# Patient Record
Sex: FEMALE | Race: WHITE | Employment: OTHER
[De-identification: names, ages, dates, MRNs, and addresses within clinical notes are randomized per-mention and may not be internally consistent; named-entity substitution may affect disease eponyms.]

---

## 2024-03-29 ENCOUNTER — CLINICAL DOCUMENTATION (OUTPATIENT)
Dept: CASE MANAGEMENT | Age: 74
End: 2024-03-29

## 2024-03-29 ENCOUNTER — OFFICE VISIT (OUTPATIENT)
Dept: ONCOLOGY | Age: 74
End: 2024-03-29
Payer: MEDICARE

## 2024-03-29 ENCOUNTER — HOSPITAL ENCOUNTER (OUTPATIENT)
Dept: INFUSION THERAPY | Age: 74
Discharge: HOME | End: 2024-03-29
Payer: MEDICARE

## 2024-03-29 VITALS
TEMPERATURE: 98 F | SYSTOLIC BLOOD PRESSURE: 146 MMHG | DIASTOLIC BLOOD PRESSURE: 93 MMHG | OXYGEN SATURATION: 96 % | HEART RATE: 167 BPM | RESPIRATION RATE: 16 BRPM

## 2024-03-29 VITALS
HEIGHT: 60 IN | TEMPERATURE: 98 F | HEART RATE: 167 BPM | RESPIRATION RATE: 16 BRPM | OXYGEN SATURATION: 96 % | SYSTOLIC BLOOD PRESSURE: 146 MMHG | WEIGHT: 153.4 LBS | DIASTOLIC BLOOD PRESSURE: 93 MMHG | BODY MASS INDEX: 30.12 KG/M2

## 2024-03-29 DIAGNOSIS — K62.9 ANAL LESION: Primary | ICD-10-CM

## 2024-03-29 PROCEDURE — 99204 OFFICE O/P NEW MOD 45 MIN: CPT | Performed by: INTERNAL MEDICINE

## 2024-03-29 PROCEDURE — 99211 OFF/OP EST MAY X REQ PHY/QHP: CPT

## 2024-03-29 PROCEDURE — 1123F ACP DISCUSS/DSCN MKR DOCD: CPT | Performed by: INTERNAL MEDICINE

## 2024-03-29 RX ORDER — M-VIT,TX,IRON,MINS/CALC/FOLIC 27MG-0.4MG
1 TABLET ORAL DAILY
COMMUNITY

## 2024-03-29 RX ORDER — AMLODIPINE BESYLATE 5 MG/1
5 TABLET ORAL NIGHTLY
COMMUNITY
Start: 2024-02-14

## 2024-03-29 RX ORDER — ASPIRIN 81 MG/1
81 TABLET ORAL DAILY
COMMUNITY
End: 2024-03-29 | Stop reason: ALTCHOICE

## 2024-03-29 RX ORDER — VIT C/B6/B5/MAGNESIUM/HERB 173 50-5-6-5MG
500 CAPSULE ORAL DAILY
COMMUNITY
End: 2024-03-29

## 2024-03-29 ASSESSMENT — ENCOUNTER SYMPTOMS
ALLERGIC/IMMUNOLOGIC NEGATIVE: 1
EYES NEGATIVE: 1
RESPIRATORY NEGATIVE: 1
GASTROINTESTINAL NEGATIVE: 1

## 2024-03-29 NOTE — PATIENT INSTRUCTIONS
Follow up in 3 weeks. Referral to Dr. Sharma for rebiopsy of anal/rectal lesion and evaluation. Referral to rad onc for possible alka/rectal squamous cell cancer in situ ct scan chest abd pelvis for staging. Also request biopsy from [facility] for our pathologist to review

## 2024-03-29 NOTE — PROGRESS NOTES
Dear Pooja, [facility] [place] [facility]  [facility]  [address]  [address]  [place] [address]  Dept: [phone]  Loc: [phone]   Hematology/Oncology Consult (Clinic)        3/29/24     Tiffany Baldwin   1950     No ref. provider found   Pooja Herrera, APRN - CNP       Reason: Dear Pooja thank you for requesting a consult concerning patients anal/rectal lesion squamous instu histology for further evaluation and recommendations  .   Chief Complaint   Patient presents with    Follow-up     SQUAMOUS CELL CARCINOMA IN SITU ANAL         ASSESSMENT:     1. Anal lesion  -     Robbin Garcia MD, Radiation Oncology, [place]  -     CT CHEST W CONTRAST; Future  -     CT ABDOMEN PELVIS W IV CONTRAST Additional Contrast? Oral; Future  -     [facility] - Rene Zamudio DO, General Surgery, [place]       PLAN:   Tiffany had a colonoscopy where a 2 cm flat lesion was seen in the anal rectal region whose  biopsy came back as squamous cell in situ.  The question arises whether this is an anal versus rectal cancer.  Given the squamous histology would lean towards anal cancer but this was in situ.  We will request the pathology from [facility] to be read by our [facility] pathologist.  Will also get a CT scan of chest abdomen pelvis for staging purposes.  I will refer to radiation oncology for further evaluation  given this is an anal rectal lesion that may or may not need radiaiton.  I will also have general surgery see patient for possible rebiopsy of this lesion for more definitive diagnosis if possible.  She will return to see us after scans and these consults once we have more information I will make further recommendations at that time.  We did discuss that if this  was an anal cancer, that it was very early stage most likely and very treatable with combined chemotherapy and radiation.  However we need more information before we can make definitive

## 2024-03-29 NOTE — PROGRESS NOTES
Name: Tiffany Baldwin  : 1950  MRN: [id number]    Oncology Navigation- Initial Note:    Intake-  Contact Type: Medical Oncology    Diagnosis:  Anal-Squamous Insitu  Colonoscopy 24 by Dr. Hernandez; BX taken +cancer.  Pt reports NO signs or symptoms.  Previous screening colonoscopy was 12 yrs ago.    Colonoscopy was ordered by her PCP s/p Alecia hernandez.     ONC request for path slides to be obtained & sent to [facility]'s Path dept for review.-->MARILEE Lou updated & is working to obtain.    Home Disposition: Lives alone--16 yr old grandson stays with her intermittently.  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 [employer]    Patient needs and barriers to care: Coordination of Care, Knowledge deficit, and Symptom Management     Referral Source: Outpatient    Receptive to Advanced Care Planning/ Palliative Care:  deferred    Interventions-   General Interventions: [facility] program discussed; welcome folder reviewed     Education/Screenings:  yes -     ONC POC:  -Rad Onc consult  -Surg consult for further BX  -Obtain path slides for [facility] pathology to review-->[place] coordinating  -Tumor Board Presentation  -Reviewed tx recommendations with chemo/XRT:  5FU, mitomycin with XRT  -CT CAP  -Pt verbalized, \"never wanted to know if got cancer bc would opt for no tx bc she didn't want her quality of life impacted\"-->Onc discussion with pt re: dx being treatable, although tx has some side effects that will need to be managed vs the impact on her quality of life if opting to do nothing.  -Return 2 weeks to see ONC     Referrals: Supportive Therapies +     Continuum of Care: Diagnosis/Active Treatment    Notes: [facility] following to assist & support    Electronically signed by Jodie Benson RN on 3/29/2024 at 1:02 PM

## 2024-04-05 DIAGNOSIS — E78.2 MIXED HYPERLIPIDEMIA: ICD-10-CM

## 2024-04-05 DIAGNOSIS — I10 ESSENTIAL HYPERTENSION: Primary | ICD-10-CM

## 2024-04-06 ENCOUNTER — HOSPITAL ENCOUNTER (OUTPATIENT)
Age: 74
Discharge: HOME | End: 2024-04-06
Attending: INTERNAL MEDICINE
Payer: MEDICARE

## 2024-04-06 ENCOUNTER — HOSPITAL ENCOUNTER (OUTPATIENT)
Dept: CT IMAGING | Age: 74
Discharge: HOME | End: 2024-04-06
Attending: INTERNAL MEDICINE
Payer: MEDICARE

## 2024-04-06 DIAGNOSIS — K62.9 ANAL LESION: ICD-10-CM

## 2024-04-06 LAB
CREAT BLD-MCNC: 0.7 MG/DL (ref 0.5–1.2)
GFR SERPL CREATININE-BSD FRML MDRD: > 90 ML/MIN/1.73M2

## 2024-04-06 PROCEDURE — 71260 CT THORAX DX C+: CPT

## 2024-04-06 PROCEDURE — 82565 ASSAY OF CREATININE: CPT

## 2024-04-06 PROCEDURE — 6360000004 HC RX CONTRAST MEDICATION: Performed by: INTERNAL MEDICINE

## 2024-04-06 PROCEDURE — 74177 CT ABD & PELVIS W/CONTRAST: CPT

## 2024-04-06 RX ADMIN — IOPAMIDOL 100 ML: 755 INJECTION, SOLUTION INTRAVENOUS at 09:00

## 2024-04-09 ENCOUNTER — OFFICE VISIT (OUTPATIENT)
Dept: SURGERY | Age: 74
End: 2024-04-09
Payer: MEDICARE

## 2024-04-09 ENCOUNTER — HOSPITAL ENCOUNTER (OUTPATIENT)
Age: 74
Discharge: HOME | End: 2024-04-09
Payer: MEDICARE

## 2024-04-09 VITALS
TEMPERATURE: 97.6 F | HEART RATE: 90 BPM | WEIGHT: 153.2 LBS | DIASTOLIC BLOOD PRESSURE: 84 MMHG | HEIGHT: 60 IN | RESPIRATION RATE: 18 BRPM | OXYGEN SATURATION: 97 % | SYSTOLIC BLOOD PRESSURE: 132 MMHG | BODY MASS INDEX: 30.08 KG/M2

## 2024-04-09 DIAGNOSIS — Z01.818 PRE-OP TESTING: ICD-10-CM

## 2024-04-09 DIAGNOSIS — C20 PRIMARY SQUAMOUS CELL CARCINOMA OF RECTUM (HCC): Primary | ICD-10-CM

## 2024-04-09 PROBLEM — E78.2 MIXED HYPERLIPIDEMIA: Status: ACTIVE | Noted: 2024-04-09

## 2024-04-09 PROBLEM — I10 ESSENTIAL HYPERTENSION: Status: ACTIVE | Noted: 2024-04-09

## 2024-04-09 LAB
ANION GAP SERPL CALC-SCNC: 10 MEQ/L (ref 8–16)
BUN SERPL-MCNC: 16 MG/DL (ref 7–22)
CALCIUM SERPL-MCNC: 9.6 MG/DL (ref 8.5–10.5)
CHLORIDE SERPL-SCNC: 103 MEQ/L (ref 98–111)
CO2 SERPL-SCNC: 27 MEQ/L (ref 23–33)
CREAT SERPL-MCNC: 0.8 MG/DL (ref 0.4–1.2)
GFR SERPL CREATININE-BSD FRML MDRD: 78 ML/MIN/1.73M2
GLUCOSE SERPL-MCNC: 100 MG/DL (ref 70–108)
HCT VFR BLD AUTO: 45.6 % (ref 37–47)
HGB BLD-MCNC: 14.9 GM/DL (ref 12–16)
POTASSIUM SERPL-SCNC: 3.9 MEQ/L (ref 3.5–5.2)
SODIUM SERPL-SCNC: 140 MEQ/L (ref 135–145)

## 2024-04-09 PROCEDURE — G8427 DOCREV CUR MEDS BY ELIG CLIN: HCPCS | Performed by: SURGERY

## 2024-04-09 PROCEDURE — G8419 CALC BMI OUT NRM PARAM NOF/U: HCPCS | Performed by: SURGERY

## 2024-04-09 PROCEDURE — 1090F PRES/ABSN URINE INCON ASSESS: CPT | Performed by: SURGERY

## 2024-04-09 PROCEDURE — 85014 HEMATOCRIT: CPT

## 2024-04-09 PROCEDURE — 80048 BASIC METABOLIC PNL TOTAL CA: CPT

## 2024-04-09 PROCEDURE — 36415 COLL VENOUS BLD VENIPUNCTURE: CPT

## 2024-04-09 PROCEDURE — 85018 HEMOGLOBIN: CPT

## 2024-04-09 PROCEDURE — 3079F DIAST BP 80-89 MM HG: CPT | Performed by: SURGERY

## 2024-04-09 PROCEDURE — 3075F SYST BP GE 130 - 139MM HG: CPT | Performed by: SURGERY

## 2024-04-09 PROCEDURE — 99203 OFFICE O/P NEW LOW 30 MIN: CPT | Performed by: SURGERY

## 2024-04-09 RX ORDER — ASPIRIN 81 MG/1
81 TABLET, CHEWABLE ORAL DAILY
COMMUNITY
End: 2024-04-09

## 2024-04-09 RX ORDER — PHENOL 1.4 %
1 AEROSOL, SPRAY (ML) MUCOUS MEMBRANE 2 TIMES DAILY
COMMUNITY

## 2024-04-09 RX ORDER — DIPHENOXYLATE HYDROCHLORIDE AND ATROPINE SULFATE 2.5; .025 MG/1; MG/1
2 TABLET ORAL 4 TIMES DAILY PRN
COMMUNITY

## 2024-04-09 NOTE — PROGRESS NOTES
Out    Polio vaccine  Aged Out    Meningococcal (ACWY) vaccine  Aged Out     Review of Systems  Unless otherwise stated in HPI, ROS was unremarkable.       OBJECTIVE    VITALS:  height is 1.524 m (5') and weight is 69.5 kg (153 lb 3.2 oz). Her temporal temperature is 97.6 °F (36.4 °C). Her blood pressure is 132/84 and her pulse is 90. Her respiration is 18 and oxygen saturation is 97%.   CONSTITUTIONAL: Alert and oriented times 3, no acute distress and cooperative to examination with proper mood and affect.  SKIN: Skin color, texture, turgor normal. No rashes or lesions.  LYMPH: no cervical nodes, no inguinal nodes  HEENT: Head is normocephalic, atraumatic. EOMI, PERRLA.  NECK: Supple, symmetrical, trachea midline, no adenopathy, thyroid symmetric, not enlarged and no tenderness, skin normal.  CHEST/LUNGS: chest symmetric with normal A/P diameter, normal respiratory rate and rhythm, lungs clear to auscultation without wheezes, rales or rhonchi. No accessory muscle use. Scars None   CARDIOVASCULAR: Heart sounds are normal.  Regular rate and rhythm without murmur, gallop or rub. Normal S1 and S2. Carotid and femoral pulses 2+/4 and equal bilaterally.  ABDOMEN: Normal shape. No scar(s) present. Normal bowel sounds.  No bruits. soft, nontender, nondistended, no masses or organomegaly. no evidence of hernia. Percussion: Normal without hepatosplenomegally. Tenderness: absent.  RECTAL: 1.5 cm lesion palpable at the anal verge at the 3 O'clock position.   NEUROLOGIC: There are no focalizing motor or sensory deficits. CN II-XII are grossly intact..   EXTREMITIES: no cyanosis, no clubbing, and no edema.      Thank you for the interesting evaluation. Further recommendations as listed above.       Electronically signed by Rene Zamudio DO on 4/9/2024 at 2:46 PM

## 2024-04-10 ENCOUNTER — HOSPITAL ENCOUNTER (OUTPATIENT)
Dept: RADIATION ONCOLOGY | Age: 74
End: 2024-04-10
Payer: MEDICARE

## 2024-04-16 ENCOUNTER — PREP FOR PROCEDURE (OUTPATIENT)
Dept: SURGERY | Age: 74
End: 2024-04-16

## 2024-04-16 RX ORDER — SODIUM CHLORIDE 0.9 % (FLUSH) 0.9 %
5-40 SYRINGE (ML) INJECTION EVERY 12 HOURS SCHEDULED
Status: CANCELLED | OUTPATIENT
Start: 2024-04-16

## 2024-04-16 RX ORDER — SODIUM CHLORIDE 9 MG/ML
INJECTION, SOLUTION INTRAVENOUS PRN
Status: CANCELLED | OUTPATIENT
Start: 2024-04-16

## 2024-04-16 RX ORDER — SODIUM CHLORIDE 0.9 % (FLUSH) 0.9 %
5-40 SYRINGE (ML) INJECTION PRN
Status: CANCELLED | OUTPATIENT
Start: 2024-04-16

## 2024-04-17 NOTE — H&P
Rene Zamudio D.O. [facility] GENERAL SURGERY  [address]  [place] [address]  [phone]  New Patient Evaluation in Office    Pt Name: Tiffany Baldwin  Date of Birth 1950   Today's Date: 4/9/2024  Medical Record Number: [id number]  Referring Provider: Bassam Umana MD  Primary Care Provider: Pooja Herrera APRN - CNP  Chief Complaint   Patient presents with    Surgical Consult     NP refer Dr. Umana-Rebiopsy anal/rectal lesion     ASSESSMENT       Diagnosis Orders   1. Primary squamous cell carcinoma of rectum (HCC)  Basic Metabolic Panel    Hemoglobin and Hematocrit      2. Pre-op testing  Basic Metabolic Panel    Hemoglobin and Hematocrit           PLANS      Schedule Tiffany for excision of anal lesion  Potential diagnostic and therapeutic modalities were discussed with the patient including surgical and nonsurgical options. The risks, complications and benefits of the options were discussed. Complications including, but not limited to, bleeding, wound infection, recurrence, and sphincter injury were reviewed. The patient was given an opportunity to ask questions. Once answered, the patient is agreeable to proceed with surgery  Status: Outpatient  Planned anesthesia: general  She will undergo pre-operative clearance per anesthesia guidelines with risk factors listed under the past medical history diagnosis & problem list.     LESLIE Allen is a 73 y.o. female seen in the consultation for evaluation of an anal lesion. She underwent a screening colonoscopy performed by GI service on 3/15/24 showing a 2 cm squamous cell carcinoma, bx stating in situ. CT chest/abd/pelvis unable to visualize the lesion, no evidence of metastatic disease. She denies any rectal pain, bleeding, weight loss, previous injury to the rectum. Denies any STD'S.   Past Medical History  Past Medical History:   Diagnosis Date    Breast cancer (HCC)     Hyperlipidemia      evaluation: 4/18/2024    I have examined the patient and reviewed the H&P/Consult and there are no changes to the patient or plans.         Electronically signed by Rene Zamudio DO on 4/18/2024 at 1:48 PM

## 2024-04-17 NOTE — DISCHARGE INSTRUCTIONS
DR. ESPINOZA'S DISCHARGE INSTRUCTIONS    Pt Name: Tiffany Baldwin  Medical Record Number: [id number]  Today's Date: 4/18/2024  GENERAL ANESTHESIA OR SEDATION   1. Do not drive or operate hazardous machinery for 24 hours.  2. Do not make important business or personal decisions for 24 hours.  3. Do not drink alcoholic beverages or use tobacco for 24 hours.  ACTIVITY INSTRUCTIONS   You may resume normal activity tomorrow. Do not engage in strenuous activity that may place stress on your incision.   DIET INSTRUCTIONS   Regular diet as tolerated.  MEDICATIONS   Prescription written for Norco. Take as directed.  Do not drink alcohol or drive while taking pain medications. You may experience dizziness or drowsiness with these medications. You may also experience constipation which can be relieved with stool softners or laxatives.  You may resume your daily prescription medication schedule unless otherwise specified.  Do not take 325mg Aspirin or other blood thinners such as Coumadin or Plavix for 5 days.  WOUND & DRESSING INSTRUCTIONS   Always ensure you and your care giver clean hands before and after caring for the wound.  Keep dressing clean and dry for 48 hours. Change when soiled or wet.      Take sitz bath for 20 minutes twice daily and after bowel movements.  FOLLOW UP CARE, SPECIFICALLY WATCH FOR:    Fever over 101 degrees by mouth   Increased redness, warmth, hardness at operative site.   Blood soaked dressing (small amounts of oozing may be normal.)   Increased or progressive drainage from the surgical area   Inability to urinate or blood in the urine   Pain not relieved by the medications ordered   Persistent nausea and/or vomiting, unable to retain fluids.    FOLLOW-UP APPOINTMENT in 2 weeks    Call my office if you have any problem that concerns you, [phone]. After hours, you can reach the answering service via the office phone number. IF YOU NEED IMMEDIATE ATTENTION, GO TO THE EMERGENCY ROOM  AND YOUR DOCTOR WILL BE CONTACTED.      [address] [place] [address] [id number]  [place] [address]      Electronically signed by Rene Zamudio DO on 4/18/2024 at 3:32 PM

## 2024-04-18 ENCOUNTER — ANESTHESIA (OUTPATIENT)
Dept: OPERATING ROOM | Age: 74
End: 2024-04-18
Payer: MEDICARE

## 2024-04-18 ENCOUNTER — ANESTHESIA EVENT (OUTPATIENT)
Dept: OPERATING ROOM | Age: 74
End: 2024-04-18
Payer: MEDICARE

## 2024-04-18 ENCOUNTER — HOSPITAL ENCOUNTER (OUTPATIENT)
Age: 74
Setting detail: OUTPATIENT SURGERY
Discharge: HOME | End: 2024-04-18
Attending: SURGERY | Admitting: SURGERY
Payer: MEDICARE

## 2024-04-18 VITALS
OXYGEN SATURATION: 99 % | RESPIRATION RATE: 16 BRPM | SYSTOLIC BLOOD PRESSURE: 148 MMHG | BODY MASS INDEX: 30.04 KG/M2 | HEART RATE: 71 BPM | WEIGHT: 153 LBS | HEIGHT: 60 IN | TEMPERATURE: 97.6 F | DIASTOLIC BLOOD PRESSURE: 68 MMHG

## 2024-04-18 DIAGNOSIS — G89.18 ACUTE POSTOPERATIVE PAIN: Primary | ICD-10-CM

## 2024-04-18 DIAGNOSIS — C20 SQUAMOUS CELL CARCINOMA OF RECTUM (HCC): ICD-10-CM

## 2024-04-18 PROCEDURE — 3600000002 HC SURGERY LEVEL 2 BASE: Performed by: SURGERY

## 2024-04-18 PROCEDURE — 3700000000 HC ANESTHESIA ATTENDED CARE: Performed by: SURGERY

## 2024-04-18 PROCEDURE — 6370000000 HC RX 637 (ALT 250 FOR IP): Performed by: SURGERY

## 2024-04-18 PROCEDURE — 7100000011 HC PHASE II RECOVERY - ADDTL 15 MIN: Performed by: SURGERY

## 2024-04-18 PROCEDURE — 6360000002 HC RX W HCPCS: Performed by: SURGERY

## 2024-04-18 PROCEDURE — 2580000003 HC RX 258: Performed by: SURGERY

## 2024-04-18 PROCEDURE — 3600000012 HC SURGERY LEVEL 2 ADDTL 15MIN: Performed by: SURGERY

## 2024-04-18 PROCEDURE — 88305 TISSUE EXAM BY PATHOLOGIST: CPT

## 2024-04-18 PROCEDURE — 7100000010 HC PHASE II RECOVERY - FIRST 15 MIN: Performed by: SURGERY

## 2024-04-18 PROCEDURE — 45171 EXC RECT TUM TRANSANAL PART: CPT | Performed by: SURGERY

## 2024-04-18 PROCEDURE — 7100000000 HC PACU RECOVERY - FIRST 15 MIN: Performed by: SURGERY

## 2024-04-18 PROCEDURE — 3700000001 HC ADD 15 MINUTES (ANESTHESIA): Performed by: SURGERY

## 2024-04-18 PROCEDURE — 7100000001 HC PACU RECOVERY - ADDTL 15 MIN: Performed by: SURGERY

## 2024-04-18 PROCEDURE — 6360000002 HC RX W HCPCS

## 2024-04-18 PROCEDURE — 2709999900 HC NON-CHARGEABLE SUPPLY: Performed by: SURGERY

## 2024-04-18 RX ORDER — MORPHINE SULFATE 2 MG/ML
1 INJECTION, SOLUTION INTRAMUSCULAR; INTRAVENOUS EVERY 5 MIN PRN
Status: DISCONTINUED | OUTPATIENT
Start: 2024-04-18 | End: 2024-04-18 | Stop reason: HOSPADM

## 2024-04-18 RX ORDER — SODIUM CHLORIDE 9 MG/ML
INJECTION, SOLUTION INTRAVENOUS PRN
Status: DISCONTINUED | OUTPATIENT
Start: 2024-04-18 | End: 2024-04-18 | Stop reason: HOSPADM

## 2024-04-18 RX ORDER — SODIUM CHLORIDE 0.9 % (FLUSH) 0.9 %
5-40 SYRINGE (ML) INJECTION EVERY 12 HOURS SCHEDULED
Status: DISCONTINUED | OUTPATIENT
Start: 2024-04-18 | End: 2024-04-18 | Stop reason: HOSPADM

## 2024-04-18 RX ORDER — FENTANYL CITRATE 50 UG/ML
INJECTION, SOLUTION INTRAMUSCULAR; INTRAVENOUS PRN
Status: DISCONTINUED | OUTPATIENT
Start: 2024-04-18 | End: 2024-04-18 | Stop reason: SDUPTHER

## 2024-04-18 RX ORDER — KETOROLAC TROMETHAMINE 30 MG/ML
INJECTION, SOLUTION INTRAMUSCULAR; INTRAVENOUS PRN
Status: DISCONTINUED | OUTPATIENT
Start: 2024-04-18 | End: 2024-04-18 | Stop reason: SDUPTHER

## 2024-04-18 RX ORDER — NALOXONE HYDROCHLORIDE 0.4 MG/ML
INJECTION, SOLUTION INTRAMUSCULAR; INTRAVENOUS; SUBCUTANEOUS PRN
Status: DISCONTINUED | OUTPATIENT
Start: 2024-04-18 | End: 2024-04-18 | Stop reason: HOSPADM

## 2024-04-18 RX ORDER — HYDROCODONE BITARTRATE AND ACETAMINOPHEN 5; 325 MG/1; MG/1
1 TABLET ORAL EVERY 4 HOURS PRN
Status: DISCONTINUED | OUTPATIENT
Start: 2024-04-18 | End: 2024-04-18 | Stop reason: HOSPADM

## 2024-04-18 RX ORDER — DEXAMETHASONE SODIUM PHOSPHATE 10 MG/ML
INJECTION, EMULSION INTRAMUSCULAR; INTRAVENOUS PRN
Status: DISCONTINUED | OUTPATIENT
Start: 2024-04-18 | End: 2024-04-18 | Stop reason: SDUPTHER

## 2024-04-18 RX ORDER — PROPOFOL 10 MG/ML
INJECTION, EMULSION INTRAVENOUS PRN
Status: DISCONTINUED | OUTPATIENT
Start: 2024-04-18 | End: 2024-04-18 | Stop reason: SDUPTHER

## 2024-04-18 RX ORDER — HYDROCODONE BITARTRATE AND ACETAMINOPHEN 5; 325 MG/1; MG/1
1 TABLET ORAL EVERY 4 HOURS PRN
Qty: 30 TABLET | Refills: 0 | Status: SHIPPED | OUTPATIENT
Start: 2024-04-18 | End: 2024-04-23

## 2024-04-18 RX ORDER — ONDANSETRON 2 MG/ML
INJECTION INTRAMUSCULAR; INTRAVENOUS PRN
Status: DISCONTINUED | OUTPATIENT
Start: 2024-04-18 | End: 2024-04-18 | Stop reason: SDUPTHER

## 2024-04-18 RX ORDER — ONDANSETRON 2 MG/ML
4 INJECTION INTRAMUSCULAR; INTRAVENOUS EVERY 6 HOURS PRN
Status: DISCONTINUED | OUTPATIENT
Start: 2024-04-18 | End: 2024-04-18 | Stop reason: HOSPADM

## 2024-04-18 RX ORDER — FENTANYL CITRATE 50 UG/ML
50 INJECTION, SOLUTION INTRAMUSCULAR; INTRAVENOUS EVERY 5 MIN PRN
Status: DISCONTINUED | OUTPATIENT
Start: 2024-04-18 | End: 2024-04-18 | Stop reason: HOSPADM

## 2024-04-18 RX ORDER — LABETALOL HYDROCHLORIDE 5 MG/ML
10 INJECTION, SOLUTION INTRAVENOUS
Status: DISCONTINUED | OUTPATIENT
Start: 2024-04-18 | End: 2024-04-18 | Stop reason: HOSPADM

## 2024-04-18 RX ORDER — SODIUM CHLORIDE 0.9 % (FLUSH) 0.9 %
5-40 SYRINGE (ML) INJECTION PRN
Status: DISCONTINUED | OUTPATIENT
Start: 2024-04-18 | End: 2024-04-18 | Stop reason: HOSPADM

## 2024-04-18 RX ORDER — LIDOCAINE HCL/PF 100 MG/5ML
SYRINGE (ML) INJECTION PRN
Status: DISCONTINUED | OUTPATIENT
Start: 2024-04-18 | End: 2024-04-18 | Stop reason: SDUPTHER

## 2024-04-18 RX ORDER — BUPIVACAINE HYDROCHLORIDE 5 MG/ML
INJECTION, SOLUTION PERINEURAL PRN
Status: DISCONTINUED | OUTPATIENT
Start: 2024-04-18 | End: 2024-04-18 | Stop reason: ALTCHOICE

## 2024-04-18 RX ORDER — DIBUCAINE 1 G/100G
OINTMENT TOPICAL PRN
Status: DISCONTINUED | OUTPATIENT
Start: 2024-04-18 | End: 2024-04-18 | Stop reason: ALTCHOICE

## 2024-04-18 RX ADMIN — PROPOFOL 150 MG: 10 INJECTION, EMULSION INTRAVENOUS at 14:26

## 2024-04-18 RX ADMIN — ONDANSETRON 4 MG: 2 INJECTION INTRAMUSCULAR; INTRAVENOUS at 14:33

## 2024-04-18 RX ADMIN — FENTANYL CITRATE 100 MCG: 50 INJECTION, SOLUTION INTRAMUSCULAR; INTRAVENOUS at 14:26

## 2024-04-18 RX ADMIN — Medication 60 MG: at 14:26

## 2024-04-18 RX ADMIN — SODIUM CHLORIDE: 9 INJECTION, SOLUTION INTRAVENOUS at 12:04

## 2024-04-18 RX ADMIN — CEFOXITIN 2000 MG: 2 INJECTION, POWDER, FOR SOLUTION INTRAVENOUS at 14:32

## 2024-04-18 RX ADMIN — DEXAMETHASONE SODIUM PHOSPHATE 10 MG: 10 INJECTION, EMULSION INTRAMUSCULAR; INTRAVENOUS at 14:26

## 2024-04-18 RX ADMIN — KETOROLAC TROMETHAMINE 15 MG: 30 INJECTION, SOLUTION INTRAMUSCULAR at 15:00

## 2024-04-18 ASSESSMENT — PAIN - FUNCTIONAL ASSESSMENT: PAIN_FUNCTIONAL_ASSESSMENT: NONE - DENIES PAIN

## 2024-04-18 NOTE — PROGRESS NOTES
1503 pt arrived to PACU, awakens to voice and denies pain. VSS. Dressing CDI  1518 pt awake in bed, denies pain or nausea. VSS  1533 meets criteria for discharge from PACU, transported to [facility] in stable condition

## 2024-04-18 NOTE — ANESTHESIA POSTPROCEDURE EVALUATION
Department of Anesthesiology  Postprocedure Note    Patient: Tiffany Baldwin  MRN: [id number]  YOB: 1950  Date of evaluation: 4/18/2024    Procedure Summary       Date: 04/18/24 Room / Location: [facility] OR [address] [place] [address] OR    Anesthesia Start: 1422 Anesthesia Stop: 1506    Procedure: Transrectal Excision Squamous Cell Carcinoma Diagnosis:       Squamous cell carcinoma of rectum (HCC)      (Squamous cell carcinoma of rectum (HCC) [C20])    Surgeons: Rnee Zamudio DO Responsible Provider: Rustam Salazar MD    Anesthesia Type: General ASA Status: 3            Anesthesia Type: General    Luis Fernando Phase I: Luis Fernando Score: 10    Luis Fernando Phase II: Luis Fernando Score: 10    Anesthesia Post Evaluation    Patient location during evaluation: PACU  Patient participation: complete - patient participated  Level of consciousness: awake  Airway patency: patent  Nausea & Vomiting: no vomiting and no nausea  Cardiovascular status: hemodynamically stable  Respiratory status: acceptable and nasal cannula  Hydration status: stable  Pain management: adequate    No notable events documented.

## 2024-04-18 NOTE — PROGRESS NOTES

## 2024-04-18 NOTE — BRIEF OP NOTE
Brief Postoperative Note      Patient: Tiffany Baldwin  YOB: 1950  MRN: [id number]    Date of Procedure: 4/18/2024    Pre-Op Diagnosis Codes:     * Squamous cell carcinoma of rectum (HCC) [C20]    Post-Op Diagnosis: Same       Procedure(s):  Transrectal Excision Squamous Cell Carcinoma    Surgeon(s):  Rene Zamudio DO    Assistant:  Resident: Ricardo Esquivel DPM    Anesthesia: * No anesthesia type entered *    Estimated Blood Loss (mL): 20    Complications: None    Specimens:   ID Type Source Tests Collected by Time Destination   A : anal lesison Tissue Anus SURGICAL PATHOLOGY Rene Zamudio DO 4/18/2024 1438        Findings:  This procedure was not performed to treat primary cutaneous melanoma through wide local excision    Electronically signed by Rene Zamudio DO on 4/18/2024 at 3:29 PM

## 2024-04-18 NOTE — ANESTHESIA PRE PROCEDURE
Department of Anesthesiology  Preprocedure Note       Name:  Tiffany Baldwin   Age:  73 y.o.  :  1950                                          MRN:  [id number]         Date:  2024      Surgeon: Surgeon(s):  Rene Zamudio DO    Procedure: Procedure(s):  Transrectal Excision Squamous Cell Carcinoma    Medications prior to admission:   Prior to Admission medications    Medication Sig Start Date End Date Taking? Authorizing Provider   calcium carbonate 600 MG TABS tablet Take 1 tablet by mouth in the morning and at bedtime    Leela Wang MD   diphenoxylate-atropine (LOMOTIL) 2.5-0.025 MG per tablet Take 2 tablets by mouth 4 times daily as needed for Diarrhea.    Leela Wang MD   amLODIPine (NORVASC) 5 MG tablet Take 1 tablet by mouth at bedtime 24   Leela Wang MD   Multiple Vitamins-Minerals (THERAPEUTIC MULTIVITAMIN-MINERALS) tablet Take 1 tablet by mouth daily    Leela Wang MD   Omega-3 Fatty Acids (OMEGA 3 500 PO) Take 500 mg by mouth daily    Leela Wang MD       Current medications:    Current Facility-Administered Medications   Medication Dose Route Frequency Provider Last Rate Last Admin   • sodium chloride flush 0.9 % injection 5-40 mL  5-40 mL IntraVENous 2 times per day Rene Zamudio DO       • sodium chloride flush 0.9 % injection 5-40 mL  5-40 mL IntraVENous PRN Rene Zamudio DO       • 0.9 % sodium chloride infusion   IntraVENous PRN Rene Zamudio DO       • cefOXitin (MEFOXIN) 2,000 mg in sodium chloride 0.9 % 50 mL IVPB (mini-bag)  2,000 mg IntraVENous On Call to OR Rene Zamudio DO           Allergies:    Allergies   Allergen Reactions   • Ace Inhibitors Cough       Problem List:    Patient Active Problem List   Diagnosis Code   • Essential hypertension I10   • Mixed hyperlipidemia E78.2       Past Medical History:        Diagnosis Date   • Breast cancer (HCC)    • Hyperlipidemia    • Hypertension

## 2024-04-18 NOTE — PROGRESS NOTES
Pt returned to [facility] room 2. Vitals and assessment as charted. 0.9 infusing, to count from PACU. Pt has crackers and water. Family at the bedside. Pt and family verbalized understanding of discharge criteria and call light use. Call light in reach.

## 2024-04-18 NOTE — PROGRESS NOTES
[facility] with friend at bedside. Bed in low position side rails up call light in reach. Patient denies questions at this time.

## 2024-04-19 ENCOUNTER — TELEPHONE (OUTPATIENT)
Dept: SURGERY | Age: 74
End: 2024-04-19

## 2024-04-19 NOTE — TELEPHONE ENCOUNTER
Post procedural phone call placed to patient. Patient utilizing prescribed medication with adequate control of pain. Patient education against constipation, recommended increased fluid intake, progressive ambulation and stool softeners/stimulants as directed. Patient able to readback education. Patients follow up appointment verified and confirmed in chart. Time spent for patient questions. Patient to follow up with office as needed.

## 2024-04-20 NOTE — OP NOTE
[address] [address]                            OPERATIVE REPORT      PATIENT NAME: SEEMA VILLEGAS      : 1950  MED REC NO: [id number]                       ROOM: [facility]                                               (General) POOL                                               RM    ACCOUNT NO: [id number]                       ADMIT DATE: 2024  PROVIDER: Rene Zamudio DO      DATE OF PROCEDURE:  2024    SURGEON:  Rene Zamudio DO    PREOPERATIVE DIAGNOSES:  Squamous cell carcinoma in situ of the rectum.    POSTOPERATIVE DIAGNOSIS:  Squamous cell carcinoma in situ of the rectum.    PROCEDURE PERFORMED:  Transrectal excision of squamous cell carcinoma.    ANESTHESIA:  General with local.    ESTIMATED BLOOD LOSS:  20 mL.    SPECIMEN:  Portions of the squamous cell were sent to pathology for analysis.    COMPLICATION:  None appreciated.    DISCUSSION:  The patient is a 73-year-old female who underwent a colonoscopy finding a lesion present at the dentate line with biopsies concerning for squamous cell in situ.  After history and physical examination performed, potential diagnostic and therapeutic modalities discussed with the patient; operative and nonoperative management discussed; risks, complications, and benefits were reviewed.  She was given the opportunity to ask questions.  Once answered, informed consent was obtained.  The patient was brought to the operating room on 2024 for the procedure.    OPERATIVE FINDINGS:  At the time of the exploration, the patient had a squamous cell lesion present at the 5 o'clock position at the dentate line.  This had a relatively soft mucosal component.  There was a fair amount of dense tissue present through the sphincter level which suggested the possibility of an invasive carcinoma.  At risk of injuring the internal sphincter, the superficial portion of the  tumor was removed as described below.    PROCEDURE DESCRIPTION:  The patient was brought to the operating room and placed in supine position; placed on continuous cardiac telemetry, blood pressure, and pulse oximeter monitoring; placed under general anesthesia by the Anesthesia Department.  The patient was then repositioned in dorsal lithotomy, padded appropriately, and the posterior rectal area was prepped and draped in sterile fashion.  Digital rectal exam performed revealing a mass present at the 5 o'clock position.  A bivalve retractor was placed into the rectum.  Visual inspection was carried out.  Please see findings above.  The external portion of the lesion was grasped and elevated and incised using a #15 scalpel blade.  The superficial portion of this was somewhat fragmented and unfortunately it was taken off in pieces.  However, upon removing it off the sphincter, there was still nodularity present at the sphincter level suggesting deeper invasion and just in situ.  At risk of injuring the internal sphincter, this was not excised.  The mucosa was reapproximated using 2-0 chromic suture.  Adequate hemostasis was appreciated.  Gelfoam pack *** placed in the rectum and sterile dressings were applied.  The patient was brought out of anesthesia and transferred to the PACU in a stable satisfactory condition.  No immediate complications evident.  All sponge, instrument, and needle counts were correct at the completion of procedure.    Postoperative findings were unable to be discussed with the patient's family as none were present.  She was given discharge instructions, prescriptions for analgesics, and will follow up my office in 2-week period of time for re-evaluation.          ZOHRA ESPINOZA DO      D:  04/19/2024 13:40:23     T:  04/19/2024 21:10:07     KATI/MINERVA  Job #:  [id number]     Doc#:  [id number]

## 2024-04-22 ENCOUNTER — OFFICE VISIT (OUTPATIENT)
Dept: ONCOLOGY | Age: 74
End: 2024-04-22
Payer: MEDICARE

## 2024-04-22 ENCOUNTER — TELEPHONE (OUTPATIENT)
Dept: SURGERY | Age: 74
End: 2024-04-22

## 2024-04-22 ENCOUNTER — HOSPITAL ENCOUNTER (OUTPATIENT)
Dept: INFUSION THERAPY | Age: 74
Discharge: HOME | End: 2024-04-22
Payer: MEDICARE

## 2024-04-22 VITALS
OXYGEN SATURATION: 95 % | WEIGHT: 152 LBS | DIASTOLIC BLOOD PRESSURE: 84 MMHG | SYSTOLIC BLOOD PRESSURE: 157 MMHG | HEART RATE: 75 BPM | TEMPERATURE: 97.6 F | RESPIRATION RATE: 16 BRPM | HEIGHT: 60 IN | BODY MASS INDEX: 29.84 KG/M2

## 2024-04-22 VITALS
OXYGEN SATURATION: 95 % | RESPIRATION RATE: 16 BRPM | DIASTOLIC BLOOD PRESSURE: 84 MMHG | SYSTOLIC BLOOD PRESSURE: 157 MMHG | TEMPERATURE: 97.6 F | HEART RATE: 75 BPM

## 2024-04-22 DIAGNOSIS — Z51.11 ENCOUNTER FOR CHEMOTHERAPY MANAGEMENT: ICD-10-CM

## 2024-04-22 DIAGNOSIS — C21.0 ANAL CANCER (HCC): Primary | ICD-10-CM

## 2024-04-22 DIAGNOSIS — K62.9 ANAL LESION: ICD-10-CM

## 2024-04-22 DIAGNOSIS — C20 PRIMARY SQUAMOUS CELL CARCINOMA OF RECTUM (HCC): Primary | ICD-10-CM

## 2024-04-22 PROBLEM — C21.1 SQUAMOUS CELL CARCINOMA OF ANAL CANAL (HCC): Status: ACTIVE | Noted: 2024-04-22

## 2024-04-22 PROCEDURE — 99211 OFF/OP EST MAY X REQ PHY/QHP: CPT

## 2024-04-22 PROCEDURE — 3077F SYST BP >= 140 MM HG: CPT | Performed by: INTERNAL MEDICINE

## 2024-04-22 PROCEDURE — 3079F DIAST BP 80-89 MM HG: CPT | Performed by: INTERNAL MEDICINE

## 2024-04-22 PROCEDURE — 1123F ACP DISCUSS/DSCN MKR DOCD: CPT | Performed by: INTERNAL MEDICINE

## 2024-04-22 PROCEDURE — 99215 OFFICE O/P EST HI 40 MIN: CPT | Performed by: INTERNAL MEDICINE

## 2024-04-22 ASSESSMENT — ENCOUNTER SYMPTOMS
GASTROINTESTINAL NEGATIVE: 1
ALLERGIC/IMMUNOLOGIC NEGATIVE: 1
RESPIRATORY NEGATIVE: 1
EYES NEGATIVE: 1

## 2024-04-22 NOTE — TELEPHONE ENCOUNTER
Pt returned call, [facility] scheduled 4/29, arrive at 7:45 am, NPO after midnight, bring a , shower with antibacterial soap morning of surgery, no jewelry or piercings.  Pt voiced understanding

## 2024-04-22 NOTE — PROGRESS NOTES
[facility] [place] [facility]  [facility]ER [facility]ENTER  [address]  [address]  [place] [address]  Dept: [phone]  Loc: [phone]   Hematology/Oncology Progress Note (Clinic)        Tiffany Baldwin  1950    4/22/2024     No ref. provider found   Pooja Herrera, APRN - CNP       ASSESSMENT:     1. Anal cancer (HCC)  -     Robbin Garcia MD, Radiation Oncology, [place]  -     [facility] Foundation[facility] Calero DO, General Surgery, [place]  2. Anal lesion  3. Encounter for chemotherapy management       PLAN:   Tiffany is second opinion path review here at [facility] showed this to be HPV positive keratinized squamous cell carcinoma therefore this is most likely anal cancer.  As we discussed per NCCN guidelines the main recommendation is for combined chemotherapy and radiation with mitomycin and 5-FU given on a 35-day cycle treatment concurrently with radiation the side effects and benefits of this treatment were discussed with patient and she has consented to treatment.  For poor venous access was  Was her her most recent surgeon for port placement.  We have also referred her to Dr. Chris ration oncology who was at tumor board when her case was presented and she is more than willing to see at this time.  As she had a recent biopsy of this area again by Dr. Galeana she may need to wait a few weeks before starting radiation and for planning.  Will have her return to see us in 2 weeks she will of course have labs as per treatment protocol once her chemotherapy starts when radiation starts.    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

## 2024-04-22 NOTE — PATIENT INSTRUCTIONS
Follow up in 2 weeks chemo to start with xrt. Iv chemo education.follow up Dr. Chris for probable xrt. Dr. Zamudio her surgeon for port placement.

## 2024-04-23 ENCOUNTER — TELEPHONE (OUTPATIENT)
Dept: CASE MANAGEMENT | Age: 74
End: 2024-04-23

## 2024-04-23 NOTE — TELEPHONE ENCOUNTER
Fortunato alerted by , pt seemed confused & overwhelmed yesterday at her appt.    Fortunato call to pt.    Pt admits to feeling confused & not sure she wants to do any tx for her cancer dx.    Pt reports she knows \"some cancer was left behind, & he didn't get it all---but I know of too many horror stories about chemotherapy and radiation, & how do I know I won't need more than what they are telling me now?\"    Fortunato shared that ONC has documented Stage II cancer;  & the pathology report reflects cancer cells to the edge of the specimen, which indicates need for further tx., & without tx, the cancer would progress..    Fortunato ecnouraged pt to keep the rad ONC appt on Mond 4/29 to get the radiation information, & to keep appt for chemo teaching on 5/1 so she can hear about the chemotherapy medications, & then she can decide if she wishes to proceed with tx.   Pt is also scheduled for her port placement on 4/29, & pt desires it to be cancelled for Monday, & can be rescheduled if she decides to proceed.  Pt verbalized she has no home support.     EMR message to providers re: the above; call to surgeon's ofce, no answer.  Fortunato will call ofce tomorrow.

## 2024-04-24 NOTE — PROGRESS NOTES
New chemotherapy validation note:    Diagnosis for chemotherapy: Squamous Cell Carcinoma of anal canal     Regimen ordered: Mitomycin/5FU w/ XRT       Reference or literature used for validation: NCCN       Date literature or guideline last updated 1/2024     Deviation from literature or guideline used: N/A    Summary of any verbal or telephone information obtained: N/A - 5FU rounded for package size    Beck Joiner RPH, PharmD, BCPS  Clinical Pharmacy Specialist  4/24/2024 1:10 PM

## 2024-04-25 ENCOUNTER — TELEPHONE (OUTPATIENT)
Dept: CASE MANAGEMENT | Age: 74
End: 2024-04-25

## 2024-04-25 NOTE — TELEPHONE ENCOUNTER
Fortunato received call from pt., reporting she has decided to proceed with tx.  She admits she was allowing outside acquaintances to fill her head with all kinds of scarey stories, which created much anxiety for pt.  Fortunato advised pt allow for her tx team to be her source for education, while knowing any toxicities can be managed, if she notifies the ofce.      Pt appreciated the call & the encouragement.  She will need support through her tx.  Pt has Rad Onc appt on Monday afternoon; she gets her port placed on Monday morning.    Pt was able to share specific information about her appts scheduled for 4/29.      Pt knows she can call for any questions.

## 2024-04-26 ENCOUNTER — PREP FOR PROCEDURE (OUTPATIENT)
Dept: SURGERY | Age: 74
End: 2024-04-26

## 2024-04-26 RX ORDER — SODIUM CHLORIDE 0.9 % (FLUSH) 0.9 %
5-40 SYRINGE (ML) INJECTION EVERY 12 HOURS SCHEDULED
Status: CANCELLED | OUTPATIENT
Start: 2024-04-26

## 2024-04-26 RX ORDER — SODIUM CHLORIDE 9 MG/ML
INJECTION, SOLUTION INTRAVENOUS CONTINUOUS
Status: CANCELLED | OUTPATIENT
Start: 2024-04-26

## 2024-04-26 RX ORDER — SODIUM CHLORIDE 9 MG/ML
INJECTION, SOLUTION INTRAVENOUS PRN
Status: CANCELLED | OUTPATIENT
Start: 2024-04-26

## 2024-04-26 RX ORDER — SODIUM CHLORIDE 0.9 % (FLUSH) 0.9 %
5-40 SYRINGE (ML) INJECTION PRN
Status: CANCELLED | OUTPATIENT
Start: 2024-04-26

## 2024-04-26 NOTE — OP NOTE
[facility]  RECORD OF OPERATION  PATIENT NAME: Tiffany CURRY [facility]               MEDICAL RECORD NO. [id number]                DATE OF PROCEDURE: 4/29/2024  SURGEON: HANNA Braden  PRIMARY CARE PHYSICIAN: Pooja Herrera APRN - CNP     PREOPERATIVE DIAGNOSIS:  Need for IV access for chemotherapy and fitting/ adjustment of catheter.  POSTOPERATIVE DIAGNOSIS:  Same  PROCEDURE PERFORMED: Left subclavian powerport insertion with fluoroscopic assistance.   SURGEON:  Dr. Rene Zamudio  ANESTHESIA:  IV sedation with local.  BLOOD LOSS:   20  ml.  SPECIMENS:  None.  COMPLICATIONS:  None immediately appreciated.     DISCUSSION:  Tiffany is a 73 y.o.-year-old female who has a diagnosis of cancer and is to undergo chemotherapy and required semipermanent IV access for therapy.  After a history and physical examination was performed, potential diagnostic and therapeutic modalities were discussed with the patient.  Variations of IV access techniques were discussed.  The risks, complications and benefits were reviewed. She was given the opportunity to ask questions.  Once answered, informed consent was obtained. She was brought to the operating on 4/29/2024  procedure.     PROCEDURE:  The patient was brought to the operating room, placed in the supine position, placed under continuous cardiac telemetry, blood pressure, pulse oximetry monitoring. Placed under IV sedation with the Anesthesia department. The Left subclavian region was prepped and draped in the sterile fashion.  The infraclavicular region was infiltrated with local anesthetic and through the anesthetized region. An introducer needle was inserted into the subclavian vein returning dark red, non-pulsatile blood.  The guidewire was placed with use of the needle and directed into the superior vena cava via fluoroscopic guidance.  A small incision was made in the skin using a #11 scalpel blade and tissue dilator and introducer

## 2024-04-26 NOTE — H&P
Out    Polio vaccine  Aged Out    Meningococcal (ACWY) vaccine  Aged Out     Review of Systems  Unless otherwise stated in HPI, ROS was unremarkable.       OBJECTIVE    VITALS:  height is 1.524 m (5') and weight is 69.5 kg (153 lb 3.2 oz). Her temporal temperature is 97.6 °F (36.4 °C). Her blood pressure is 132/84 and her pulse is 90. Her respiration is 18 and oxygen saturation is 97%.   CONSTITUTIONAL: Alert and oriented times 3, no acute distress and cooperative to examination with proper mood and affect.  SKIN: Skin color, texture, turgor normal. No rashes or lesions.  LYMPH: no cervical nodes, no inguinal nodes  HEENT: Head is normocephalic, atraumatic. EOMI, PERRLA.  NECK: Supple, symmetrical, trachea midline, no adenopathy, thyroid symmetric, not enlarged and no tenderness, skin normal.  CHEST/LUNGS: chest symmetric with normal A/P diameter, normal respiratory rate and rhythm, lungs clear to auscultation without wheezes, rales or rhonchi. No accessory muscle use. Scars None   CARDIOVASCULAR: Heart sounds are normal.  Regular rate and rhythm without murmur, gallop or rub. Normal S1 and S2. Carotid and femoral pulses 2+/4 and equal bilaterally.  ABDOMEN: Normal shape. No scar(s) present. Normal bowel sounds.  No bruits. soft, nontender, nondistended, no masses or organomegaly. no evidence of hernia. Percussion: Normal without hepatosplenomegally. Tenderness: absent.  RECTAL: 1.5 cm lesion palpable at the anal verge at the 3 O'clock position.   NEUROLOGIC: There are no focalizing motor or sensory deficits. CN II-XII are grossly intact..   EXTREMITIES: no cyanosis, no clubbing, and no edema.      Thank you for the interesting evaluation. Further recommendations as listed above.       Electronically signed by Rene Zamudio DO on 4/9/2024 at 2:46 PM      Rene Zamudio DO  [facility]  History and Physical Update    Pt Name: Tiffany Baldwin  MRN: [id number]  YOB: 1950  Date of

## 2024-04-29 ENCOUNTER — HOSPITAL ENCOUNTER (OUTPATIENT)
Age: 74
Setting detail: OUTPATIENT SURGERY
Discharge: HOME | End: 2024-04-29
Attending: SURGERY | Admitting: SURGERY
Payer: MEDICARE

## 2024-04-29 ENCOUNTER — APPOINTMENT (OUTPATIENT)
Dept: GENERAL RADIOLOGY | Age: 74
End: 2024-04-29
Attending: SURGERY
Payer: MEDICARE

## 2024-04-29 ENCOUNTER — ANESTHESIA (OUTPATIENT)
Dept: OPERATING ROOM | Age: 74
End: 2024-04-29
Payer: MEDICARE

## 2024-04-29 ENCOUNTER — HOSPITAL ENCOUNTER (OUTPATIENT)
Dept: RADIATION ONCOLOGY | Age: 74
Discharge: HOME | End: 2024-04-29
Payer: MEDICARE

## 2024-04-29 ENCOUNTER — ANESTHESIA EVENT (OUTPATIENT)
Dept: OPERATING ROOM | Age: 74
End: 2024-04-29
Payer: MEDICARE

## 2024-04-29 ENCOUNTER — CLINICAL DOCUMENTATION (OUTPATIENT)
Dept: CASE MANAGEMENT | Age: 74
End: 2024-04-29

## 2024-04-29 VITALS
OXYGEN SATURATION: 97 % | DIASTOLIC BLOOD PRESSURE: 92 MMHG | WEIGHT: 150.4 LBS | RESPIRATION RATE: 20 BRPM | HEART RATE: 86 BPM | TEMPERATURE: 97 F | BODY MASS INDEX: 29.53 KG/M2 | SYSTOLIC BLOOD PRESSURE: 133 MMHG | HEIGHT: 60 IN

## 2024-04-29 VITALS
OXYGEN SATURATION: 97 % | TEMPERATURE: 97 F | SYSTOLIC BLOOD PRESSURE: 133 MMHG | HEART RATE: 86 BPM | BODY MASS INDEX: 29.53 KG/M2 | DIASTOLIC BLOOD PRESSURE: 92 MMHG | WEIGHT: 150.4 LBS | HEIGHT: 60 IN | RESPIRATION RATE: 20 BRPM

## 2024-04-29 DIAGNOSIS — C21.1 SQUAMOUS CELL CARCINOMA OF ANAL CANAL (HCC): Primary | ICD-10-CM

## 2024-04-29 DIAGNOSIS — G89.18 ACUTE POSTOPERATIVE PAIN: Primary | ICD-10-CM

## 2024-04-29 PROCEDURE — 7100000011 HC PHASE II RECOVERY - ADDTL 15 MIN: Performed by: SURGERY

## 2024-04-29 PROCEDURE — 2580000003 HC RX 258: Performed by: SURGERY

## 2024-04-29 PROCEDURE — 3700000001 HC ADD 15 MINUTES (ANESTHESIA): Performed by: SURGERY

## 2024-04-29 PROCEDURE — 7100000010 HC PHASE II RECOVERY - FIRST 15 MIN: Performed by: SURGERY

## 2024-04-29 PROCEDURE — 71045 X-RAY EXAM CHEST 1 VIEW: CPT

## 2024-04-29 PROCEDURE — 6360000002 HC RX W HCPCS: Performed by: SURGERY

## 2024-04-29 PROCEDURE — 6360000002 HC RX W HCPCS: Performed by: NURSE ANESTHETIST, CERTIFIED REGISTERED

## 2024-04-29 PROCEDURE — 2709999900 HC NON-CHARGEABLE SUPPLY: Performed by: SURGERY

## 2024-04-29 PROCEDURE — 99202 OFFICE O/P NEW SF 15 MIN: CPT | Performed by: RADIOLOGY

## 2024-04-29 PROCEDURE — 36561 INSERT TUNNELED CV CATH: CPT | Performed by: SURGERY

## 2024-04-29 PROCEDURE — 77001 FLUOROGUIDE FOR VEIN DEVICE: CPT

## 2024-04-29 PROCEDURE — 2500000003 HC RX 250 WO HCPCS: Performed by: SURGERY

## 2024-04-29 PROCEDURE — C1788 PORT, INDWELLING, IMP: HCPCS | Performed by: SURGERY

## 2024-04-29 PROCEDURE — 3600000002 HC SURGERY LEVEL 2 BASE: Performed by: SURGERY

## 2024-04-29 PROCEDURE — 3600000012 HC SURGERY LEVEL 2 ADDTL 15MIN: Performed by: SURGERY

## 2024-04-29 PROCEDURE — 99204 OFFICE O/P NEW MOD 45 MIN: CPT

## 2024-04-29 PROCEDURE — 77001 FLUOROGUIDE FOR VEIN DEVICE: CPT | Performed by: SURGERY

## 2024-04-29 PROCEDURE — 3700000000 HC ANESTHESIA ATTENDED CARE: Performed by: SURGERY

## 2024-04-29 DEVICE — PORT INFUS 6FR SLIM POLYUR ATTCH OPN END SGL LUMN VEN CATH: Type: IMPLANTABLE DEVICE | Status: FUNCTIONAL

## 2024-04-29 RX ORDER — LIDOCAINE HYDROCHLORIDE 10 MG/ML
INJECTION, SOLUTION INFILTRATION; PERINEURAL PRN
Status: DISCONTINUED | OUTPATIENT
Start: 2024-04-29 | End: 2024-04-29 | Stop reason: ALTCHOICE

## 2024-04-29 RX ORDER — SODIUM CHLORIDE 9 MG/ML
INJECTION, SOLUTION INTRAVENOUS CONTINUOUS
Status: DISCONTINUED | OUTPATIENT
Start: 2024-04-29 | End: 2024-04-29 | Stop reason: HOSPADM

## 2024-04-29 RX ORDER — HEPARIN 100 UNIT/ML
SYRINGE INTRAVENOUS PRN
Status: DISCONTINUED | OUTPATIENT
Start: 2024-04-29 | End: 2024-04-29 | Stop reason: ALTCHOICE

## 2024-04-29 RX ORDER — LIDOCAINE HCL/PF 100 MG/5ML
SYRINGE (ML) INJECTION PRN
Status: DISCONTINUED | OUTPATIENT
Start: 2024-04-29 | End: 2024-04-29 | Stop reason: SDUPTHER

## 2024-04-29 RX ORDER — HYDROCODONE BITARTRATE AND ACETAMINOPHEN 5; 325 MG/1; MG/1
1 TABLET ORAL EVERY 4 HOURS PRN
Qty: 30 TABLET | Refills: 0 | Status: SHIPPED | OUTPATIENT
Start: 2024-04-29 | End: 2024-05-04

## 2024-04-29 RX ORDER — PROPOFOL 10 MG/ML
INJECTION, EMULSION INTRAVENOUS PRN
Status: DISCONTINUED | OUTPATIENT
Start: 2024-04-29 | End: 2024-04-29 | Stop reason: SDUPTHER

## 2024-04-29 RX ORDER — MIDAZOLAM HYDROCHLORIDE 1 MG/ML
INJECTION INTRAMUSCULAR; INTRAVENOUS PRN
Status: DISCONTINUED | OUTPATIENT
Start: 2024-04-29 | End: 2024-04-29 | Stop reason: SDUPTHER

## 2024-04-29 RX ORDER — SODIUM CHLORIDE 0.9 % (FLUSH) 0.9 %
5-40 SYRINGE (ML) INJECTION PRN
Status: DISCONTINUED | OUTPATIENT
Start: 2024-04-29 | End: 2024-04-29 | Stop reason: HOSPADM

## 2024-04-29 RX ORDER — HYDROCODONE BITARTRATE AND ACETAMINOPHEN 5; 325 MG/1; MG/1
1 TABLET ORAL EVERY 4 HOURS PRN
Status: DISCONTINUED | OUTPATIENT
Start: 2024-04-29 | End: 2024-04-29 | Stop reason: HOSPADM

## 2024-04-29 RX ORDER — ONDANSETRON 2 MG/ML
4 INJECTION INTRAMUSCULAR; INTRAVENOUS EVERY 6 HOURS PRN
Status: DISCONTINUED | OUTPATIENT
Start: 2024-04-29 | End: 2024-04-29 | Stop reason: HOSPADM

## 2024-04-29 RX ORDER — SODIUM CHLORIDE 9 MG/ML
INJECTION, SOLUTION INTRAVENOUS PRN
Status: DISCONTINUED | OUTPATIENT
Start: 2024-04-29 | End: 2024-04-29 | Stop reason: HOSPADM

## 2024-04-29 RX ORDER — SODIUM CHLORIDE 0.9 % (FLUSH) 0.9 %
5-40 SYRINGE (ML) INJECTION EVERY 12 HOURS SCHEDULED
Status: DISCONTINUED | OUTPATIENT
Start: 2024-04-29 | End: 2024-04-29 | Stop reason: HOSPADM

## 2024-04-29 RX ORDER — FENTANYL CITRATE 50 UG/ML
INJECTION, SOLUTION INTRAMUSCULAR; INTRAVENOUS PRN
Status: DISCONTINUED | OUTPATIENT
Start: 2024-04-29 | End: 2024-04-29 | Stop reason: SDUPTHER

## 2024-04-29 RX ADMIN — PROPOFOL 150 MCG/KG/MIN: 10 INJECTION, EMULSION INTRAVENOUS at 09:05

## 2024-04-29 RX ADMIN — SODIUM CHLORIDE: 9 INJECTION, SOLUTION INTRAVENOUS at 09:00

## 2024-04-29 RX ADMIN — FENTANYL CITRATE 25 MCG: 50 INJECTION, SOLUTION INTRAMUSCULAR; INTRAVENOUS at 09:00

## 2024-04-29 RX ADMIN — PROPOFOL 25 MG: 10 INJECTION, EMULSION INTRAVENOUS at 09:04

## 2024-04-29 RX ADMIN — Medication 50 MG: at 09:04

## 2024-04-29 RX ADMIN — MIDAZOLAM 1 MG: 1 INJECTION INTRAMUSCULAR; INTRAVENOUS at 09:00

## 2024-04-29 RX ADMIN — WATER 2000 MG: 1 INJECTION INTRAMUSCULAR; INTRAVENOUS; SUBCUTANEOUS at 09:10

## 2024-04-29 ASSESSMENT — ENCOUNTER SYMPTOMS
ABDOMINAL DISTENTION: 0
NAUSEA: 0
COUGH: 0
ABDOMINAL PAIN: 0
RECTAL PAIN: 0
SHORTNESS OF BREATH: 0
VOMITING: 0
BLOOD IN STOOL: 0
BACK PAIN: 0
CONSTIPATION: 0
DIARRHEA: 1
ANAL BLEEDING: 0

## 2024-04-29 ASSESSMENT — PAIN - FUNCTIONAL ASSESSMENT
PAIN_FUNCTIONAL_ASSESSMENT: 0-10

## 2024-04-29 ASSESSMENT — LIFESTYLE VARIABLES: SMOKING_STATUS: 0

## 2024-04-29 NOTE — PROGRESS NOTES
difficulty, weakness, light-headedness, numbness and headaches.   Psychiatric/Behavioral:  Negative for confusion.        Advance Directives       Power of  Living Will ACP-Advance Directive ACP-Power of     Not on File Not on File Not on File Not on File            Chaperone: Keeley Benson RN    Mediport: yes, put in 24 with Dr. Zamudio    Pacemaker/ICD: no    Previous XRT: yes,  at [facility] for Left Breast    PAIN: 0/10    ADDITIONAL COMMENTS: Feels overwhelmed    All portions of this note that were completed during the initial nursing assessment were discussed and reviewed in detail with the nursing staff member who completed this portion of the note and I agree with the information and assessment as written. A complete review of systems was performed and found to be negative except as presented above.    PHYSICAL EXAMINATION:     VITAL SIGNS: BP (!) 133/92   Pulse 86   Temp 97 °F (36.1 °C) (Tympanic)   Resp 20   Ht 1.524 m (5')   Wt 68.2 kg (150 lb 6.4 oz)   SpO2 97%   BMI 29.37 kg/m²     ECO - Symptomatic but completely ambulatory (Restricted in physically strenuous activity but ambulatory and able to carry out work of a light or sedentary nature. For example, light housework, office work)    Physical Exam  Constitutional:       General: She is not in acute distress.     Appearance: Normal appearance.   HENT:      Head: Normocephalic and atraumatic.   Pulmonary:      Effort: Pulmonary effort is normal. No respiratory distress.   Abdominal:      General: Abdomen is flat.   Genitourinary:     Comments: Deferred today (will complete ADAN and inguinal node palpation on CT simulation day)  Neurological:      Mental Status: She is alert and oriented to person, place, and time.   Psychiatric:         Mood and Affect: Mood is anxious.         Past Medical History:   Diagnosis Date    Anal cancer (HCC)     Breast cancer (HCC)     Hyperlipidemia     Hypertension        Past Surgical

## 2024-04-29 NOTE — ANESTHESIA PRE PROCEDURE
Past Medical History:        Diagnosis Date   • Breast cancer (HCC)    • Hyperlipidemia    • Hypertension        Past Surgical History:        Procedure Laterality Date   • ANUS SURGERY N/A 4/18/2024    Transrectal Excision Squamous Cell Carcinoma [facility] by [facility]   • BREAST LUMPECTOMY Left 02/2007   • COLONOSCOPY  01/27/2024    Dr. Hernandez   • COLONOSCOPY  07/12/2013    [facility] Dr. Hernandez       Social History:    Social History     Tobacco Use   • Smoking status: Never   • Smokeless tobacco: Never   Substance Use Topics   • Alcohol use: Never                                Counseling given: Not Answered      Vital Signs (Current):   Vitals:    04/29/24 0754   BP: 131/81   Pulse: 90   Resp: 16   Temp: 97.3 °F (36.3 °C)   TempSrc: Temporal   SpO2: 96%   Weight: 68.2 kg (150 lb 6.4 oz)   Height: 1.524 m (5')                                              BP Readings from Last 3 Encounters:   04/29/24 131/81   04/22/24 (!) 157/84   04/22/24 (!) 157/84       NPO Status: Time of last liquid consumption: 1930                        Time of last solid consumption: 1930                        Date of last liquid consumption: 04/28/24                        Date of last solid food consumption: 04/28/24    BMI:   Wt Readings from Last 3 Encounters:   04/29/24 68.2 kg (150 lb 6.4 oz)   04/22/24 68.9 kg (152 lb)   04/18/24 69.4 kg (153 lb)     Body mass index is 29.37 kg/m².    CBC:   Lab Results   Component Value Date/Time    HGB 14.9 04/09/2024 12:30 PM    HCT 45.6 04/09/2024 12:30 PM       CMP:   Lab Results   Component Value Date/Time     04/09/2024 12:30 PM    K 3.9 04/09/2024 12:30 PM     04/09/2024 12:30 PM    CO2 27 04/09/2024 12:30 PM    BUN 16 04/09/2024 12:30 PM    CREATININE 0.8 04/09/2024 12:30 PM    LABGLOM 78 04/09/2024 12:30 PM    LABGLOM 60 01/03/2024 07:52 AM    GLUCOSE 100 04/09/2024 12:30 PM    CALCIUM 9.6 04/09/2024 12:30 PM       POC Tests: No results for

## 2024-04-29 NOTE — DISCHARGE INSTRUCTIONS
DR. ESPINOZA'S DISCHARGE INSTRUCTIONS    Pt Name: Tiffany Baldwin  Medical Record Number: [id number]  Today's Date: 4/29/2024  GENERAL ANESTHESIA OR SEDATION   1. Do not drive or operate hazardous machinery for 24 hours.  2. Do not make important business or personal decisions for 24 hours.  3. Do not drink alcoholic beverages or use tobacco for 24 hours.  ACTIVITY INSTRUCTIONS   You may resume normal to light activity tomorrow. Do not participate in activity that may place stress on your incision   DIET INSTRUCTIONS   Regular diet as tolerated.  MEDICATIONS   Prescription written for Norco. Take as directed.  Do not drink alcohol or drive while taking pain medications. You may experience dizziness or drowsiness with these medications. You may also experience constipation which can be relieved with stool softners or laxatives.  You may resume your daily prescription medication schedule unless otherwise specified.  Do not take 325mg Aspirin or other blood thinners such as Coumadin or Plavix for 5 days.  WOUND & DRESSING INSTRUCTIONS   Always ensure you and your care giver clean hands before and after caring for the wound.  Keep dressing clean and dry for 48 hours. Change when soiled or wet.      Allow steri-strips to fall off on their own.   Ice operative site for 20 minutes 4 times a day.     May wash over incision in shower in 48 hours, but do not soak in a bath.  ABDOMINAL & LAPAROSCOPIC PROCEDURES   1. You are encouraged to get up and move around as this helps with the circulation and speeds up the healing process.  2. Breath deeply and cough from time to time. This helps to clear your lungs and helps prevent pneumonia.  3. Supporting your incision with a pillow or your hand helps to minimize discomfort and pain.  FOLLOW UP CARE, SPECIFICALLY WATCH FOR:    Fever over 101 degrees by mouth   Increased redness, warmth, hardness at operative site.   Blood soaked dressing (small amounts of oozing may be

## 2024-04-29 NOTE — PROGRESS NOTES

## 2024-04-29 NOTE — PROGRESS NOTES
Patient oriented to Same Day department and admitted to Same Day Surgery room 3.   Patient verbalized approval for first name, last initial with physician name on unit whiteboard.     Plan of care reviewed with patient.   Patient room whiteboard filled out and discussed with patient and responsible adult.   Patient and responsible adult offered Same Day Welcome Packet to review.    Call light in reach.   Bed in lowest position, locked, with one bed rail up.   SCDs and warming blanket in place.  Appropriate arm bands on patient.   Bathroom offered.   All questions and concerns of patient addressed.        Meds to Beds:   Patient informed of [facility] Meds to [facility] program during admission. Patient has declined use of program.

## 2024-04-29 NOTE — ANESTHESIA POSTPROCEDURE EVALUATION
Department of Anesthesiology  Postprocedure Note    Patient: Tiffany Baldwin  MRN: [id number]  YOB: 1950  Date of evaluation: 4/29/2024    Procedure Summary       Date: 04/29/24 Room / Location: [facility] OR  / [facility] OR    Anesthesia Start: 0900 Anesthesia Stop: 0935    Procedure: SINGLE LUMEN SMARTPORT INSERTION (Left: Chest) Diagnosis:       Squamous cell carcinoma of rectum (HCC)      (Squamous cell carcinoma of rectum (HCC) [C20])    Surgeons: Rene Zamudio DO Responsible Provider: Vinod Malave MD    Anesthesia Type: MAC ASA Status: 2            Anesthesia Type: MAC    Luis Fernando Phase I: Luis Fernando Score: 10    Luis Fernando Phase II: Luis Fernando Score: 10    Anesthesia Post Evaluation    Patient location during evaluation: PACU  Patient participation: complete - patient participated  Level of consciousness: awake and alert  Airway patency: patent  Nausea & Vomiting: no nausea  Cardiovascular status: blood pressure returned to baseline and hemodynamically stable  Respiratory status: acceptable and spontaneous ventilation  Hydration status: euvolemic  Pain management: adequate    No notable events documented.

## 2024-04-30 NOTE — PROGRESS NOTES
Oncology Social Work    Date: 4/30/2024  Time: 12:09 PM  Name: Tiffany Baldwin  MRN: [id number]     Contact Type: Follow-up    Note:   Situation: This staff called Tiffany Baldwin via phone support to introduce myself as her Oncology Social Worker.     Background:  Tiffany was referred to this staff by the Radiation Dept since she had her recent appointment here. This staff was calling to review her Distress Thermometer completed during that visit.     Assessment: The contact number provided to this staff and Medical Records is her number and it was identified as such in the voicemail recording. Since the call went immediately to a voicemail, a message was left regarding the purpose of the call.   - Education regarding the services was provided on the recorded message from the SW.  - No community referrals were placed at this time because there was no conversation indicating where Tiffany in her treatment plan from her perspective. Her Distress thermometer indicated she is \"very overwhelmed\" so she was encouraged to please reach out to me. Referral services may be reconsidered should she express needs regarding assistance.     Recommendation: Follow-up will be initiated by Tiffany based on need.  provided her with my contact information and will remain available for support.        WENDY Wyatt, KAYLIN, TESSIE  Oncology Social Worker      Electronically signed by WENDY Wyatt LSW, TESSIE on 4/30/2024 at 12:09 PM

## 2024-05-01 ENCOUNTER — HOSPITAL ENCOUNTER (OUTPATIENT)
Dept: INFUSION THERAPY | Age: 74
Discharge: HOME | End: 2024-05-01
Payer: MEDICARE

## 2024-05-01 PROCEDURE — 99212 OFFICE O/P EST SF 10 MIN: CPT

## 2024-05-01 PROCEDURE — 99211 OFF/OP EST MAY X REQ PHY/QHP: CPT

## 2024-05-01 NOTE — PLAN OF CARE
Problem: Safety - Adult  Goal: Free from fall injury  Outcome: Adequate for Discharge  Flowsheets (Taken 5/1/2024 1735)  Free From Fall Injury: Instruct family/caregiver on patient safety  Note: No falls occurred with visit today.     Problem: Discharge Planning  Goal: Discharge to home or other facility with appropriate resources  Outcome: Adequate for Discharge  Flowsheets (Taken 5/1/2024 1735)  Discharge to home or other facility with appropriate resources: Identify barriers to discharge with patient and caregiver  Note: Verbalized understanding of discharge instructions, follow-up appointments, and when to call the physician.     Problem: Chronic Conditions and Co-morbidities  Goal: Patient's chronic conditions and co-morbidity symptoms are monitored and maintained or improved  Outcome: Adequate for Discharge  Flowsheets (Taken 5/1/2024 1735)  Care Plan - Patient's Chronic Conditions and Co-Morbidity Symptoms are Monitored and Maintained or Improved: Monitor and assess patient's chronic conditions and comorbid symptoms for stability, deterioration, or improvement  Note: Patient verbalizes understanding to verbal information given on planned chemotherapy. Aware to call MD if develop complications.      Care plan reviewed with patient.  Patient verbalizes understanding of the plan of care and contribute to goal setting.

## 2024-05-01 NOTE — PROGRESS NOTES
Chemotherapy/Immunotherapy Teaching Checklist    Treatment Plan: Mitomycin and Fluorouracil  Frequency: Days 1-4 and 29-32  Patient alone, not accompanied by anyone.      Day of chemo instructions:  [] Must have    [x] Eat light breakfast  [x] Bring pain medications/other routine medications scheduled during appointment time  [] Hold blood pressure medications morning of treatment    Treatment process:  [x] Flow of appointment  [x] IV access Peripheral start  or  PORT access process [x] EMLA cream  [x] Premedication/ Hydration  [x] Length of treatment  [x] Tour of clinic    Diet and hydration:  [x] Discuss Isabella diet    [x] Eating Hints book provided  [x] Importance of hydration 48- 64 ounces daily   [x] Hydration handout provided     Side Effects:  [x] Chemotherapy and you book provided  [x] Side effects and management discussed   [x] Diarrhea[x]Nausea/Vomiting [x]home antiemetic [x]hair loss[x]Neuropathy[x] Constipation[x] Fatigue[x]Mouth sores[x] Dehydration[x] Skin/Nail Changes []Pneumonitis []Colitis [] Hepatitis []Thyroid changes  []Fertility issues (birth control, sperm/egg banking issues)    Labs:  [x]BMP- renal function and electrolytes discussed  [x] CBC- RBC, WBC with ANC, platelet counts discussed  [x]Understanding your Blood hand out given   [x]Neutropenia handout/Reduce infection handout [x]Thrombocytopenia handout   [x]Frank discussed    Complications that require immediate attention from physician:  [x]Fever 100.3 [x]signs of infection- chills, fever, burning with urination, worsening cough   [x]Uncontrolled vomiting [x]Uncontrolled diarrhea/constipation   [x]Unable to drink 48 ounces [x]Uncontrolled pain  [x] When to notify provider handout given  [x] After hours contact process discussed    Home instructions:  [x] Instruct to shut the lid and double flush toilet for 48 hours after chemotherapy  [x] Instruct family members to wear gloves when will be handling  body fluids    Support

## 2024-05-02 NOTE — PROGRESS NOTES
Name: Tiffany Baldwin  : 1950  MRN: [id number]    Oncology Navigation Follow-Up Note    Contact Type:  Radiation Oncology- consult     Subjective: appt with Rad ONC    Objective:  Pt reports leaky stooling from rectum since surgery; pt wears pads. Port placement done today ()     Rad ONC PA discussed pt's pathology & recommendations for care- 5-6 weeks of tx:  -XRT with chemo  -Sim   -Advised to keep chemo teaching appt on   -PET scan & MRI Pelvis ordered.  PET ; MRI     Assistance Needed: requesting much support    Receptive to Advanced Care Planning / Palliative Care:  deferred    Referrals: N/A    Education: POC reiterated    Notes: Fortunato following to assist.    Electronically signed by Jodie Benson RN on 2024 at 5:40 PM

## 2024-05-06 ENCOUNTER — HOSPITAL ENCOUNTER (OUTPATIENT)
Dept: PET IMAGING | Age: 74
Discharge: HOME | End: 2024-05-06
Payer: MEDICARE

## 2024-05-06 DIAGNOSIS — C21.1 SQUAMOUS CELL CARCINOMA OF ANAL CANAL (HCC): ICD-10-CM

## 2024-05-06 PROCEDURE — 78815 PET IMAGE W/CT SKULL-THIGH: CPT

## 2024-05-06 PROCEDURE — A9609 HC RX DIAGNOSTIC RADIOPHARMACEUTICAL: HCPCS

## 2024-05-06 PROCEDURE — 3430000000 HC RX DIAGNOSTIC RADIOPHARMACEUTICAL

## 2024-05-06 RX ORDER — FLUDEOXYGLUCOSE F 18 200 MCI/ML
15.3 INJECTION, SOLUTION INTRAVENOUS
Status: COMPLETED | OUTPATIENT
Start: 2024-05-06 | End: 2024-05-06

## 2024-05-06 RX ADMIN — FLUDEOXYGLUCOSE F 18 15.3 MILLICURIE: 200 INJECTION, SOLUTION INTRAVENOUS at 10:32

## 2024-05-07 DIAGNOSIS — C20 PRIMARY SQUAMOUS CELL CARCINOMA OF RECTUM (HCC): ICD-10-CM

## 2024-05-09 ENCOUNTER — SOCIAL WORK (OUTPATIENT)
Dept: INFUSION THERAPY | Age: 74
End: 2024-05-09

## 2024-05-09 ENCOUNTER — HOSPITAL ENCOUNTER (OUTPATIENT)
Dept: MRI IMAGING | Age: 74
Discharge: HOME | End: 2024-05-09
Payer: MEDICARE

## 2024-05-09 DIAGNOSIS — C21.1 SQUAMOUS CELL CARCINOMA OF ANAL CANAL (HCC): ICD-10-CM

## 2024-05-09 PROCEDURE — A9579 GAD-BASE MR CONTRAST NOS,1ML: HCPCS

## 2024-05-09 PROCEDURE — 6360000004 HC RX CONTRAST MEDICATION

## 2024-05-09 PROCEDURE — 72197 MRI PELVIS W/O & W/DYE: CPT

## 2024-05-09 RX ADMIN — GADOTERIDOL 15 ML: 279.3 INJECTION, SOLUTION INTRAVENOUS at 12:13

## 2024-05-10 ENCOUNTER — OFFICE VISIT (OUTPATIENT)
Dept: SURGERY | Age: 74
End: 2024-05-10
Payer: MEDICARE

## 2024-05-10 ENCOUNTER — TELEPHONE (OUTPATIENT)
Dept: CASE MANAGEMENT | Age: 74
End: 2024-05-10

## 2024-05-10 VITALS
HEIGHT: 60 IN | HEART RATE: 87 BPM | SYSTOLIC BLOOD PRESSURE: 124 MMHG | DIASTOLIC BLOOD PRESSURE: 64 MMHG | BODY MASS INDEX: 29.72 KG/M2 | RESPIRATION RATE: 16 BRPM | WEIGHT: 151.4 LBS | TEMPERATURE: 97.2 F | OXYGEN SATURATION: 99 %

## 2024-05-10 DIAGNOSIS — C20 PRIMARY SQUAMOUS CELL CARCINOMA OF RECTUM (HCC): Primary | ICD-10-CM

## 2024-05-10 PROCEDURE — 99213 OFFICE O/P EST LOW 20 MIN: CPT | Performed by: SURGERY

## 2024-05-10 PROCEDURE — 1036F TOBACCO NON-USER: CPT | Performed by: SURGERY

## 2024-05-10 PROCEDURE — 3074F SYST BP LT 130 MM HG: CPT | Performed by: SURGERY

## 2024-05-10 PROCEDURE — 3017F COLORECTAL CA SCREEN DOC REV: CPT | Performed by: SURGERY

## 2024-05-10 PROCEDURE — G8419 CALC BMI OUT NRM PARAM NOF/U: HCPCS | Performed by: SURGERY

## 2024-05-10 PROCEDURE — 3078F DIAST BP <80 MM HG: CPT | Performed by: SURGERY

## 2024-05-10 PROCEDURE — G8427 DOCREV CUR MEDS BY ELIG CLIN: HCPCS | Performed by: SURGERY

## 2024-05-10 PROCEDURE — G8400 PT W/DXA NO RESULTS DOC: HCPCS | Performed by: SURGERY

## 2024-05-10 PROCEDURE — 1123F ACP DISCUSS/DSCN MKR DOCD: CPT | Performed by: SURGERY

## 2024-05-10 PROCEDURE — 1090F PRES/ABSN URINE INCON ASSESS: CPT | Performed by: SURGERY

## 2024-05-10 NOTE — TELEPHONE ENCOUNTER
Fortunato call to pt; no answer.  VM message left with reminder that XRT Sim is on Monday 5/13/24 at 130p with Dr. Chris, & that chemo/XRT start anticipated on 5/20/24, but would discuss at appt on Monday 5/13.      Pt requires repeat information & supportive care.

## 2024-05-10 NOTE — PROGRESS NOTES
my contact information and will remain available for support.        WENDY Wyatt, KAYLIN, TESSIE  Oncology Social Worker      Electronically signed by WENDY Wyatt LSW, TESSIE on 5/10/2024 at 8:39 AM

## 2024-05-10 NOTE — PROGRESS NOTES
[facility]  [facility]  [address]  [place] [address]  [phone]  Post Procedure Evaluation in Office    Pt Name: Tiffany Baldwin  Date of Birth 1950   Today's Date: 5/10/2024  Medical Record Number: [id number]  Referring Provider: No ref. provider found  Primary Care Provider: Pooja Herrera APRN - CNP  Chief Complaint   Patient presents with    Follow Up After Procedure     S/p left subclavian powerport insertion with fluoroscopic assistance 4/29/24     ASSESSMENT       Diagnosis Orders   1. Primary squamous cell carcinoma of rectum (HCC)      S/p local resection 4/18/24, port placement 4/29/24      Incisions are healing as expected, good granulation tissue, or no signs of infection   PLANS   Assessment & Plan   Pathology reviewed with the patient who understands. All questions were answered.  Patient Instructions   May return to full activity without restrictions.   May use imodium and fiber for seepage and soilage  Follow up: Return for As needed. Instructed to call if any concerns.      LESLIE Allen is seen today for post-op follow-up. She is S/p excision of squamous cell carcinoma of the anus 4/18/24 S/p port placement 4/29/24. She is tolerating a regular diet, having seepage and soilage due to loose bowel movements. Symptoms have gradually improved compared to preoperative. Her activity level has improved since the procedure. The surgical site is clean and has no drainage. Pain is controlled without any narcotic pain medications. She has compliant with postoperative instructions.  Port site is clean and has been functioning well.   Past Medical History   has a past medical history of Anal cancer (HCC), Breast cancer (HCC), Hyperlipidemia, and Hypertension.  Past Surgical History   has a past surgical history that includes Colonoscopy (01/27/2024); Colonoscopy (07/12/2013); Breast lumpectomy (Left, 02/2007); Anus surgery

## 2024-05-13 ENCOUNTER — TELEPHONE (OUTPATIENT)
Dept: CASE MANAGEMENT | Age: 74
End: 2024-05-13

## 2024-05-13 ENCOUNTER — HOSPITAL ENCOUNTER (OUTPATIENT)
Dept: RADIATION ONCOLOGY | Age: 74
Discharge: HOME | End: 2024-05-13
Payer: MEDICARE

## 2024-05-13 ENCOUNTER — APPOINTMENT (OUTPATIENT)
Dept: RADIATION ONCOLOGY | Age: 74
End: 2024-05-13
Payer: MEDICARE

## 2024-05-13 VITALS
HEART RATE: 99 BPM | DIASTOLIC BLOOD PRESSURE: 98 MMHG | SYSTOLIC BLOOD PRESSURE: 179 MMHG | RESPIRATION RATE: 18 BRPM | WEIGHT: 151 LBS | TEMPERATURE: 98.2 F | OXYGEN SATURATION: 96 % | BODY MASS INDEX: 29.49 KG/M2

## 2024-05-13 DIAGNOSIS — C21.1 SQUAMOUS CELL CARCINOMA OF ANAL CANAL (HCC): Primary | ICD-10-CM

## 2024-05-13 PROCEDURE — 99212 OFFICE O/P EST SF 10 MIN: CPT | Performed by: RADIOLOGY

## 2024-05-13 ASSESSMENT — ENCOUNTER SYMPTOMS
RECTAL PAIN: 0
SHORTNESS OF BREATH: 0
ANAL BLEEDING: 0
ABDOMINAL DISTENTION: 0
DIARRHEA: 1
VOMITING: 0
NAUSEA: 0
BLOOD IN STOOL: 0

## 2024-05-13 NOTE — TELEPHONE ENCOUNTER
Fortunato received notification from Rad ONC, MRI results reveal an fistula/abscess.  Dr. Baptiste alerted Dr. Zamudio accordingly.  Simulation changed to 5/28/24.    MARILEE Curran updated accordingly that chemo-radiation will not be initiated on 5/20/      Awaiting response from Radiation the anticipated start date.

## 2024-05-13 NOTE — PROGRESS NOTES
[facility]           [address], [address]        [place], [place] [address]        O: [phone]        F: [phone]       [employer]            FOLLOW UP NOTE    Date of Service: 2024  Patient ID: Tiffany Baldwin   : 1950  MRN: [id number]   Acct Number: [id number]       DATE OF SERVICE: 2024   LOCATION: [facility]  PROVIDER: Robbin Chris MD MS    FOLLOW UP PHYSICIANS: Dr. Bassam Umana ([facility]), Dr. Hernandez (GI), Dr. Zamudio (Surgery)     ASSESSMENT AND PLAN:    ICD-10-CM    1. 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  We discussed in detail her most recent radiographic imaging, which completed staging workup, with a PET/CT as well as MRI of the pelvis.  PET/CT imaging did not demonstrate any features concerning for distant spread of disease, however did note an inguinal lymph node, left.  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  Also mention on the MRI was fluid collection in the uterus, unknown etiology.    We discussed these findings in detail with the patient at today's visit.  Patient does note that she has intermittent anal discharge, since the time of recent surgery for biopsy.  She denies odor or blood with the discharge.  Patient denies any vaginal discharge.  I reviewed the most recent MRI imaging findings concerning for perianal fistula as well as abscess, briefly discussed fluid collection in the uterus and possible etiologies, patient not concerned.  We discussed

## 2024-05-17 ENCOUNTER — TELEPHONE (OUTPATIENT)
Dept: CASE MANAGEMENT | Age: 74
End: 2024-05-17

## 2024-05-17 NOTE — TELEPHONE ENCOUNTER
Fortunato received vm call from pt this morning.  Pt inquiries about AB that was discussed by Dr. Chris for an abscess, but has not received any additional info..  Fortunato alerted Dr. Chris of pt's inquiry & verbalized he would call pt for discussion.    Rad ONC informs Fortunato that he will discuss case at Cancer Conference on Tuesday 5/21.    Pt is presently scheduled for radiation sim on 5/28, with a tentative start 6/3.  Rad ONC informs chemo/radiation start may be delayed pending abscess resolution/healing.

## 2024-05-28 ENCOUNTER — HOSPITAL ENCOUNTER (OUTPATIENT)
Dept: RADIATION ONCOLOGY | Age: 74
Discharge: HOME | End: 2024-05-28
Payer: MEDICARE

## 2024-05-28 ENCOUNTER — HOSPITAL ENCOUNTER (OUTPATIENT)
Dept: CT IMAGING | Age: 74
Discharge: HOME | End: 2024-05-28

## 2024-05-28 DIAGNOSIS — C21.1 MALIGNANT NEOPLASM OF ANAL CANAL (HCC): ICD-10-CM

## 2024-05-28 PROCEDURE — 77263 THER RADIOLOGY TX PLNG CPLX: CPT | Performed by: RADIOLOGY

## 2024-05-28 PROCEDURE — 77334 RADIATION TREATMENT AID(S): CPT | Performed by: RADIOLOGY

## 2024-05-28 PROCEDURE — 77470 SPECIAL RADIATION TREATMENT: CPT | Performed by: RADIOLOGY

## 2024-05-28 PROCEDURE — 3209999900 CT GUIDE RADIATION THERAPY NO CHARGE

## 2024-05-31 DIAGNOSIS — C21.0 ANAL CANCER (HCC): Primary | ICD-10-CM

## 2024-05-31 DIAGNOSIS — K62.9 ANAL LESION: ICD-10-CM

## 2024-05-31 PROCEDURE — 77300 RADIATION THERAPY DOSE PLAN: CPT | Performed by: RADIOLOGY

## 2024-05-31 PROCEDURE — 77301 RADIOTHERAPY DOSE PLAN IMRT: CPT | Performed by: RADIOLOGY

## 2024-06-03 ENCOUNTER — HOSPITAL ENCOUNTER (OUTPATIENT)
Dept: RADIATION ONCOLOGY | Age: 74
Discharge: HOME | End: 2024-06-03
Payer: MEDICARE

## 2024-06-03 ENCOUNTER — HOSPITAL ENCOUNTER (OUTPATIENT)
Dept: INFUSION THERAPY | Age: 74
Discharge: HOME | End: 2024-06-03

## 2024-06-03 ENCOUNTER — HOSPITAL ENCOUNTER (OUTPATIENT)
Dept: INFUSION THERAPY | Age: 74
Discharge: HOME | End: 2024-06-03
Payer: MEDICARE

## 2024-06-03 ENCOUNTER — OFFICE VISIT (OUTPATIENT)
Dept: ONCOLOGY | Age: 74
End: 2024-06-03

## 2024-06-03 VITALS
TEMPERATURE: 97.6 F | HEIGHT: 60 IN | HEART RATE: 82 BPM | RESPIRATION RATE: 16 BRPM | DIASTOLIC BLOOD PRESSURE: 87 MMHG | OXYGEN SATURATION: 94 % | BODY MASS INDEX: 29.64 KG/M2 | SYSTOLIC BLOOD PRESSURE: 138 MMHG | WEIGHT: 151 LBS

## 2024-06-03 VITALS
WEIGHT: 151 LBS | OXYGEN SATURATION: 94 % | HEART RATE: 82 BPM | BODY MASS INDEX: 29.49 KG/M2 | TEMPERATURE: 97.6 F | RESPIRATION RATE: 16 BRPM | DIASTOLIC BLOOD PRESSURE: 87 MMHG | SYSTOLIC BLOOD PRESSURE: 138 MMHG

## 2024-06-03 DIAGNOSIS — C21.1 SQUAMOUS CELL CARCINOMA OF ANAL CANAL (HCC): ICD-10-CM

## 2024-06-03 DIAGNOSIS — Z51.11 ENCOUNTER FOR CHEMOTHERAPY MANAGEMENT: Primary | ICD-10-CM

## 2024-06-03 DIAGNOSIS — C21.0 ANAL CANCER (HCC): ICD-10-CM

## 2024-06-03 DIAGNOSIS — C21.1 SQUAMOUS CELL CARCINOMA OF ANAL CANAL (HCC): Primary | ICD-10-CM

## 2024-06-03 LAB
ALBUMIN SERPL BCG-MCNC: 4.2 G/DL (ref 3.5–5.1)
ALP SERPL-CCNC: 61 U/L (ref 38–126)
ALT SERPL W/O P-5'-P-CCNC: 15 U/L (ref 11–66)
AST SERPL-CCNC: 24 U/L (ref 5–40)
BASOPHILS ABSOLUTE: 0 THOU/MM3 (ref 0–0.1)
BASOPHILS NFR BLD AUTO: 0 % (ref 0–3)
BILIRUB CONJ SERPL-MCNC: < 0.2 MG/DL (ref 0–0.3)
BILIRUB SERPL-MCNC: 0.6 MG/DL (ref 0.3–1.2)
BUN BLDP-MCNC: 12 MG/DL (ref 8–26)
CHLORIDE BLD-SCNC: 105 MEQ/L (ref 98–109)
CREAT BLD-MCNC: 0.6 MG/DL (ref 0.5–1.2)
EOSINOPHIL NFR BLD AUTO: 3 % (ref 0–4)
EOSINOPHILS ABSOLUTE: 0.1 THOU/MM3 (ref 0–0.4)
ERYTHROCYTE [DISTWIDTH] IN BLOOD BY AUTOMATED COUNT: 12.6 % (ref 11.5–14.5)
GFR SERPL CREATININE-BSD FRML MDRD: > 90 ML/MIN/1.73M2
GLUCOSE BLD-MCNC: 94 MG/DL (ref 70–108)
HCT VFR BLD AUTO: 43.1 % (ref 37–47)
HGB BLD-MCNC: 14.4 GM/DL (ref 12–16)
IMMATURE GRANULOCYTES %: 0 %
IMMATURE GRANULOCYTES ABSOLUTE: 0.01 THOU/MM3 (ref 0–0.07)
IONIZED CALCIUM, WHOLE BLOOD: 1.23 MMOL/L (ref 1.12–1.32)
LYMPHOCYTES ABSOLUTE: 1 THOU/MM3 (ref 1–4.8)
LYMPHOCYTES NFR BLD AUTO: 24 % (ref 15–47)
MCH RBC QN AUTO: 29.8 PG (ref 26–33)
MCHC RBC AUTO-ENTMCNC: 33.4 GM/DL (ref 32.2–35.5)
MCV RBC AUTO: 89 FL (ref 81–99)
MONOCYTES ABSOLUTE: 0.6 THOU/MM3 (ref 0.4–1.3)
MONOCYTES NFR BLD AUTO: 13 % (ref 0–12)
NEUTROPHILS ABSOLUTE: 2.7 THOU/MM3 (ref 1.8–7.7)
NEUTROPHILS NFR BLD AUTO: 61 % (ref 43–75)
PLATELET # BLD AUTO: 165 THOU/MM3 (ref 130–400)
PMV BLD AUTO: 9.3 FL (ref 9.4–12.4)
POTASSIUM BLD-SCNC: 3.9 MEQ/L (ref 3.5–4.9)
PROT SERPL-MCNC: 7.1 G/DL (ref 6.1–8)
RBC # BLD AUTO: 4.83 MILL/MM3 (ref 4.2–5.4)
SODIUM BLD-SCNC: 142 MEQ/L (ref 138–146)
TOTAL CO2, WHOLE BLOOD: 29 MEQ/L (ref 23–33)
WBC # BLD AUTO: 4.4 THOU/MM3 (ref 4.8–10.8)

## 2024-06-03 PROCEDURE — 80047 BASIC METABLC PNL IONIZED CA: CPT

## 2024-06-03 PROCEDURE — 80076 HEPATIC FUNCTION PANEL: CPT

## 2024-06-03 PROCEDURE — 85025 COMPLETE CBC W/AUTO DIFF WBC: CPT

## 2024-06-03 PROCEDURE — 6360000002 HC RX W HCPCS: Performed by: SPECIALIST

## 2024-06-03 PROCEDURE — 77338 DESIGN MLC DEVICE FOR IMRT: CPT | Performed by: RADIOLOGY

## 2024-06-03 PROCEDURE — 96416 CHEMO PROLONG INFUSE W/PUMP: CPT

## 2024-06-03 PROCEDURE — 77014 CHG CT GUIDANCE RADIATION THERAPY FLDS PLACEMENT: CPT | Performed by: RADIOLOGY

## 2024-06-03 PROCEDURE — 2580000003 HC RX 258: Performed by: INTERNAL MEDICINE

## 2024-06-03 PROCEDURE — 99211 OFF/OP EST MAY X REQ PHY/QHP: CPT

## 2024-06-03 PROCEDURE — 96375 TX/PRO/DX INJ NEW DRUG ADDON: CPT

## 2024-06-03 PROCEDURE — 77336 RADIATION PHYSICS CONSULT: CPT | Performed by: RADIOLOGY

## 2024-06-03 PROCEDURE — 36591 DRAW BLOOD OFF VENOUS DEVICE: CPT

## 2024-06-03 PROCEDURE — 2580000003 HC RX 258: Performed by: SPECIALIST

## 2024-06-03 PROCEDURE — 77386 HC NTSTY MODUL RAD TX DLVR CPLX: CPT | Performed by: RADIOLOGY

## 2024-06-03 PROCEDURE — 96409 CHEMO IV PUSH SNGL DRUG: CPT

## 2024-06-03 RX ORDER — SODIUM CHLORIDE 9 MG/ML
INJECTION, SOLUTION INTRAVENOUS CONTINUOUS
Status: CANCELLED | OUTPATIENT
Start: 2024-06-03

## 2024-06-03 RX ORDER — DIPHENHYDRAMINE HYDROCHLORIDE 50 MG/ML
50 INJECTION INTRAMUSCULAR; INTRAVENOUS
Status: CANCELLED | OUTPATIENT
Start: 2024-06-03

## 2024-06-03 RX ORDER — SODIUM CHLORIDE 9 MG/ML
25 INJECTION, SOLUTION INTRAVENOUS PRN
Status: CANCELLED | OUTPATIENT
Start: 2024-06-03

## 2024-06-03 RX ORDER — SODIUM CHLORIDE 0.9 % (FLUSH) 0.9 %
5-40 SYRINGE (ML) INJECTION PRN
Status: CANCELLED | OUTPATIENT
Start: 2024-06-03

## 2024-06-03 RX ORDER — OLANZAPINE 5 MG/1
5 TABLET, ORALLY DISINTEGRATING ORAL NIGHTLY
Qty: 5 TABLET | Refills: 0 | Status: SHIPPED | OUTPATIENT
Start: 2024-06-03 | End: 2024-06-08

## 2024-06-03 RX ORDER — ACETAMINOPHEN 325 MG/1
650 TABLET ORAL
Status: CANCELLED | OUTPATIENT
Start: 2024-06-03

## 2024-06-03 RX ORDER — ONDANSETRON 2 MG/ML
8 INJECTION INTRAMUSCULAR; INTRAVENOUS ONCE
Status: CANCELLED | OUTPATIENT
Start: 2024-06-03 | End: 2024-06-03

## 2024-06-03 RX ORDER — EPINEPHRINE 1 MG/ML
0.3 INJECTION, SOLUTION INTRAMUSCULAR; SUBCUTANEOUS PRN
Status: CANCELLED | OUTPATIENT
Start: 2024-06-03

## 2024-06-03 RX ORDER — HEPARIN SODIUM (PORCINE) LOCK FLUSH IV SOLN 100 UNIT/ML 100 UNIT/ML
500 SOLUTION INTRAVENOUS PRN
Status: CANCELLED | OUTPATIENT
Start: 2024-06-03

## 2024-06-03 RX ORDER — PROCHLORPERAZINE EDISYLATE 5 MG/ML
10 INJECTION INTRAMUSCULAR; INTRAVENOUS
Status: CANCELLED | OUTPATIENT
Start: 2024-06-03

## 2024-06-03 RX ORDER — MEPERIDINE HYDROCHLORIDE 50 MG/ML
12.5 INJECTION INTRAMUSCULAR; INTRAVENOUS; SUBCUTANEOUS PRN
Status: CANCELLED | OUTPATIENT
Start: 2024-06-03

## 2024-06-03 RX ORDER — ONDANSETRON 2 MG/ML
8 INJECTION INTRAMUSCULAR; INTRAVENOUS ONCE
Status: COMPLETED | OUTPATIENT
Start: 2024-06-03 | End: 2024-06-03

## 2024-06-03 RX ORDER — ONDANSETRON 2 MG/ML
8 INJECTION INTRAMUSCULAR; INTRAVENOUS
Status: CANCELLED | OUTPATIENT
Start: 2024-06-03

## 2024-06-03 RX ORDER — FAMOTIDINE 10 MG/ML
20 INJECTION, SOLUTION INTRAVENOUS
Status: CANCELLED | OUTPATIENT
Start: 2024-06-03

## 2024-06-03 RX ORDER — ALBUTEROL SULFATE 90 UG/1
4 AEROSOL, METERED RESPIRATORY (INHALATION) PRN
Status: CANCELLED | OUTPATIENT
Start: 2024-06-03

## 2024-06-03 RX ORDER — MITOMYCIN 5 MG/10ML
10 INJECTION, POWDER, LYOPHILIZED, FOR SOLUTION INTRAVENOUS ONCE
Status: CANCELLED | OUTPATIENT
Start: 2024-06-03 | End: 2024-06-03

## 2024-06-03 RX ORDER — SODIUM CHLORIDE 9 MG/ML
5-250 INJECTION, SOLUTION INTRAVENOUS PRN
Status: DISCONTINUED | OUTPATIENT
Start: 2024-06-03 | End: 2024-06-04 | Stop reason: HOSPADM

## 2024-06-03 RX ORDER — SODIUM CHLORIDE 0.9 % (FLUSH) 0.9 %
5-40 SYRINGE (ML) INJECTION PRN
Status: DISCONTINUED | OUTPATIENT
Start: 2024-06-03 | End: 2024-06-04 | Stop reason: HOSPADM

## 2024-06-03 RX ORDER — SODIUM CHLORIDE 9 MG/ML
5-250 INJECTION, SOLUTION INTRAVENOUS PRN
Status: CANCELLED | OUTPATIENT
Start: 2024-06-03

## 2024-06-03 RX ORDER — EPINEPHRINE 1 MG/ML
0.3 INJECTION, SOLUTION, CONCENTRATE INTRAVENOUS PRN
Status: CANCELLED | OUTPATIENT
Start: 2024-06-03

## 2024-06-03 RX ORDER — HEPARIN 100 UNIT/ML
500 SYRINGE INTRAVENOUS PRN
Status: DISCONTINUED | OUTPATIENT
Start: 2024-06-03 | End: 2024-06-04 | Stop reason: HOSPADM

## 2024-06-03 RX ORDER — MITOMYCIN 20 MG/40ML
10 INJECTION, POWDER, LYOPHILIZED, FOR SOLUTION INTRAVENOUS ONCE
Status: COMPLETED | OUTPATIENT
Start: 2024-06-03 | End: 2024-06-03

## 2024-06-03 RX ORDER — LORAZEPAM 2 MG/ML
0.5 INJECTION INTRAMUSCULAR
Status: CANCELLED | OUTPATIENT
Start: 2024-06-03

## 2024-06-03 RX ORDER — HEPARIN 100 UNIT/ML
500 SYRINGE INTRAVENOUS PRN
Status: CANCELLED | OUTPATIENT
Start: 2024-06-03

## 2024-06-03 RX ADMIN — ONDANSETRON 8 MG: 2 INJECTION INTRAMUSCULAR; INTRAVENOUS at 10:41

## 2024-06-03 RX ADMIN — SODIUM CHLORIDE, PRESERVATIVE FREE 20 ML: 5 INJECTION INTRAVENOUS at 08:33

## 2024-06-03 RX ADMIN — MITOMYCIN 17 MG: 20 INJECTION, POWDER, LYOPHILIZED, FOR SOLUTION INTRAVENOUS at 10:54

## 2024-06-03 RX ADMIN — SODIUM CHLORIDE 20 ML/HR: 9 INJECTION, SOLUTION INTRAVENOUS at 10:41

## 2024-06-03 RX ADMIN — FLUOROURACIL 6500 MG: 50 INJECTION, SOLUTION INTRAVENOUS at 12:29

## 2024-06-03 RX ADMIN — SODIUM CHLORIDE, PRESERVATIVE FREE 10 ML: 5 INJECTION INTRAVENOUS at 08:32

## 2024-06-03 NOTE — ONCOLOGY
Chemotherapy Administration    Pre-assessment Data: Antineoplastic Agents  See toxicity flow sheet for assessment                                          [x]         Interventions:   Chemotherapy SQ injection given []   Taxol administered-VS per protocol []   Blood pressure meds held 12 hours prior to Rituxan/Ruxience []   Rituxan/Ruxience administered- VS and precautions per guidelines []   Emergency drugs available as appropriate [x]   Anaphylaxis assessment completed [x]   Pre-medications administered as ordered [x]   Blood return noted upon initiation of chemotherapy [x]   Blood return noted each 1-2ml of a vesicant medication if given IV push []   Mitomycin given as a monitored wide open drip, blood return noted before during and after infusion. [x]   Blood return noted each 2-3ml of a non-vesicant medication if given IV push []   Patient aware of potential Immunotherapy toxicities []   Monitor for signs / symptoms of hypersensitivity reaction [x]   Chemotherapy orders (drug/dose/rate) verified by 2 Chemo certified RN’s [x]   Monitor IV site and blood return throughout the infusion of the medication [x]   Document IV site checks on the IV assessment form [x]   Document chemotherapy teaching on the Patient Education tab [x]   Document patient verbalizes understanding of medications being administered [x]   If IV infiltration, see ONS Guidelines []   Other:      []

## 2024-06-03 NOTE — PATIENT INSTRUCTIONS
Follow-up prior to next ChemoRx  CBC/CBP/CHP/MG NEXT VISIT  Call with any side effects  Olanzepine 5mg QHS as needed - Ordered

## 2024-06-03 NOTE — DISCHARGE INSTRUCTIONS
Follow-up prior to next ChemoRx  CBC/CBP/CHP/MG NEXT VISIT  Call with any side effects  Olanzepine 5mg QHS as needed - Ordered    Please return Friday 6/7 to have chemo pump disconnected.    Please contact your Oncologist if you have any questions regarding the chemotherapy Mitomycin/Fluorouracil that you received today.      Patient instructed if experience any of the symptoms following today's chemotherapy / to notify MD immediately or go to emergency department.    * dizziness/lightheadedness  *acute nausea/vomiting - not relieved with medication  *headache - not relieved from Tylenol/pain medication  *chest pain/pressure  *rash/itching  *shortness of breath        Drink fluids - 48oz fluids daily  Call if develop fever/ chills/ signs or symptoms of infection

## 2024-06-03 NOTE — PROGRESS NOTES
Patient assessed for the following post chemotherapy:    Dizziness   No  Lightheadedness  No      Acute nausea/vomiting No  Headache   No  Chest pain/pressure  No  Rash/itching   No  Shortness of breath  No    Patient kept for 20 minutes observation post infusion chemotherapy.    Patient tolerated chemotherapy treatment Mitomycin/5FU per per CADD without any complications.    CADD pump 5FU added via mediport to infuse at 2.6ml/hr over 96 hours. Connections secured and tape to chest. Patient and family instructed on pump- it's usage,what to do if alarm goes off, and who to call if any questions. Verified pump running before discharge.     Last vital signs:   /87   Pulse 82   Temp 97.6 °F (36.4 °C) (Oral)   Resp 16   Wt 68.5 kg (151 lb)   SpO2 94%   BMI 29.49 kg/m²       Patient instructed if experience any of the above symptoms following today's infusion,he/she is to notify MD immediately or go to the emergency department.    Discharge instructions given to patient. Verbalizes understanding. Ambulated off unit per self with belongings.

## 2024-06-03 NOTE — PROGRESS NOTES
the above-mentioned abnormality. Just anterior to the fistula is a 1 x 0.4 cm collection that can also relate to an abscess. 3. No significant lymphadenopathy is seen in the visualized pelvis 4. There is fluid within the endometrium. This can relate to cervical stenosis versus endometrial neoplasm. Direct evaluation is recommended. **This report has been created using voice recognition software. It may contain minor errors which are inherent in voice recognition technology.** Final report electronically signed by Dr Gabbi Barnard on 5/10/2024 6:20 PM    PET CT SKULL BASE TO MID THIGH    Result Date: 5/6/2024  1. FDG avid densities at the anus likely corresponding to known malignancy. 2. FDG avid left inguinal lymph node suspicious for metastatic disease. Final report electronically signed by Dr. Rene Bowles on 5/6/2024 1:18 PM     PROCEDURES: Screening colonoscopy performed by GI service on 3/15/24 showing a 2 cm squamous cell carcinoma, bx stating in situ. CT chest/abd/pelvis unable to visualize the lesion, no evidence of metastatic disease.     PATHOLOGY: 04/18/2024  FINAL DIAGNOSIS:   Anal lesion, excisional biopsy:     Invasive basaloid squamous cell carcinoma, HPV associated.     Carcinoma is present at the specimen edges and appears incompletely   excised.     Specimen:   ANAL BIOPSY, LESION     FINAL DIAGNOSIS:   Outside slides [id number]; 2/1/24     Rectal lesion, biopsy:    HPV-associated squamous cell carcinoma, favor invasive     Research Options: NONE        Follow Up: 07/01/2024      Julissa Braun MD

## 2024-06-03 NOTE — PLAN OF CARE
Problem: Infection - Adult  Goal: Absence of infection at discharge  Outcome: Adequate for Discharge  Flowsheets (Taken 6/3/2024 1125)  Absence of infection at discharge: Assess and monitor for signs and symptoms of infection  Note: Mediport site with no redness or warmth. Skin over port site intact with no signs of breakdown noted. Patient verbalizes signs/symptoms of port infection and when to notify the physician.     Problem: Safety - Adult  Goal: Free from fall injury  Outcome: Adequate for Discharge  Flowsheets (Taken 6/3/2024 1123)  Free From Fall Injury: Instruct family/caregiver on patient safety  Note: No falls occurred with visit today.     Problem: Discharge Planning  Goal: Discharge to home or other facility with appropriate resources  Outcome: Adequate for Discharge  Flowsheets (Taken 6/3/2024 1123)  Discharge to home or other facility with appropriate resources: Identify barriers to discharge with patient and caregiver  Note: Verbalized understanding of discharge instructions, follow-up appointments, and when to call the physician.     Problem: Chronic Conditions and Co-morbidities  Goal: Patient's chronic conditions and co-morbidity symptoms are monitored and maintained or improved  Outcome: Adequate for Discharge  Flowsheets (Taken 6/3/2024 1123)  Care Plan - Patient's Chronic Conditions and Co-Morbidity Symptoms are Monitored and Maintained or Improved: Monitor and assess patient's chronic conditions and comorbid symptoms for stability, deterioration, or improvement  Note:   Chemotherapy Teaching     What is Chemotherapy   Drug action [x]   Method of Administration [x]   Handouts given []     Side Effects  Nausea/vomiting [x]   Diarrhea [x]   Fatigue [x]   Signs / Symptoms of infection [x]   Neutropenia [x]   Thrombocytopenia [x]   Alopecia [x]   neuropathy [x]   [place] diet &  the importance of fluids [x]       Micellaneous  Importance of nutrition [x]   Importance of oral hygiene [x]   When to

## 2024-06-04 ENCOUNTER — HOSPITAL ENCOUNTER (OUTPATIENT)
Dept: RADIATION ONCOLOGY | Age: 74
Discharge: HOME | End: 2024-06-04
Payer: MEDICARE

## 2024-06-04 VITALS
WEIGHT: 150.13 LBS | RESPIRATION RATE: 18 BRPM | OXYGEN SATURATION: 92 % | TEMPERATURE: 97.8 F | DIASTOLIC BLOOD PRESSURE: 83 MMHG | BODY MASS INDEX: 29.32 KG/M2 | HEART RATE: 100 BPM | SYSTOLIC BLOOD PRESSURE: 123 MMHG

## 2024-06-04 PROCEDURE — 77014 CHG CT GUIDANCE RADIATION THERAPY FLDS PLACEMENT: CPT | Performed by: RADIOLOGY

## 2024-06-04 PROCEDURE — 77386 HC NTSTY MODUL RAD TX DLVR CPLX: CPT | Performed by: RADIOLOGY

## 2024-06-04 NOTE — PROGRESS NOTES
[facility]  [address] [id number]  Phone: [phone]   Toll Free: [phone]   Fax: [phone]    RADIATION ONCOLOGY EDUCATION    CHIEF COMPLAINT: Tiffany presents to radiation oncology today for education regarding treatment to the Anal.      PLAN:   Expected and potential side effects were discussed in detail, along with written handouts.  Skin care and moisturization instructions were discussed in detail.   Patient was not agreeable to physical therapy referral. Explained referral could be placed at any time if patient changes their mind.   Patient was informed of dietician that is available weekly and as needed.  Educated on weekly on treatment visit to meet with Physician to monitor side effects and treatment course.  Informed patient that Physician is available at any time to discuss radiation side effects/concerns through out treatment course.  Tiffany had the opportunity to ask questions, and indicated that all questions were satisfactorily addressed.

## 2024-06-04 NOTE — PROGRESS NOTES
[facility]          [address], [address]        [place] [id number]        O: [phone]        F: [phone]       [employer]            Dr. Robbin Chris MD MS          Dr. 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 Dose (cGy): 5400    Patient was seen today for weekly visit.     Wt Readings from Last 3 Encounters:   24 68.1 kg (150 lb 2.1 oz)   24 68.5 kg (151 lb)   24 68.5 kg (151 lb)       /83   Pulse 100   Temp 97.8 °F (36.6 °C) (Infrared)   Resp 18   Wt 68.1 kg (150 lb 2.1 oz)   SpO2 92%   BMI 29.32 kg/m²     Lab Results   Component Value Date    WBC 4.4 (L) 2024    HGB 14.4 2024    HCT 43.1 2024     2024       Comfort Alteration  Fatigue:Must curtail daily activities even with rest periods and early bedtime    Pain Location: Denies  Pain Intensity (Current): 0 No Pain  Pain Treatment: N/A  Pain Relief: n/a    Emotional Alteration:   Coping: effective    Nutritional Alteration  Anorexia: none   Nausea: 1-2 episodes of nausea in 24 hours  Vomiting: No vomiting     Skin Alteration   Skin reaction: No changes noted    Elimination Alterations  Urinary Frequency: None  Urinary Retention: Normal  Urinary Incontinence: Stress incontinence  Dysuria: None  Nocturia: >6 times nightly - Baseline  Proctitis: None  Diarrhea: None - Leaks formed stool but has gotten

## 2024-06-05 ENCOUNTER — HOSPITAL ENCOUNTER (OUTPATIENT)
Dept: RADIATION ONCOLOGY | Age: 74
Discharge: HOME | End: 2024-06-05
Payer: MEDICARE

## 2024-06-05 PROCEDURE — 77014 CHG CT GUIDANCE RADIATION THERAPY FLDS PLACEMENT: CPT | Performed by: RADIOLOGY

## 2024-06-05 PROCEDURE — 77386 HC NTSTY MODUL RAD TX DLVR CPLX: CPT | Performed by: RADIOLOGY

## 2024-06-06 ENCOUNTER — HOSPITAL ENCOUNTER (OUTPATIENT)
Dept: RADIATION ONCOLOGY | Age: 74
Discharge: HOME | End: 2024-06-06
Payer: MEDICARE

## 2024-06-06 PROCEDURE — 77386 HC NTSTY MODUL RAD TX DLVR CPLX: CPT | Performed by: RADIOLOGY

## 2024-06-07 ENCOUNTER — HOSPITAL ENCOUNTER (OUTPATIENT)
Dept: RADIATION ONCOLOGY | Age: 74
Discharge: HOME | End: 2024-06-07
Payer: MEDICARE

## 2024-06-07 ENCOUNTER — HOSPITAL ENCOUNTER (OUTPATIENT)
Dept: INFUSION THERAPY | Age: 74
Discharge: HOME | End: 2024-06-07
Payer: MEDICARE

## 2024-06-07 VITALS
HEIGHT: 60 IN | WEIGHT: 150 LBS | DIASTOLIC BLOOD PRESSURE: 83 MMHG | BODY MASS INDEX: 29.45 KG/M2 | HEART RATE: 86 BPM | SYSTOLIC BLOOD PRESSURE: 135 MMHG | TEMPERATURE: 98.1 F | RESPIRATION RATE: 18 BRPM | OXYGEN SATURATION: 96 %

## 2024-06-07 DIAGNOSIS — C21.1 SQUAMOUS CELL CARCINOMA OF ANAL CANAL (HCC): Primary | ICD-10-CM

## 2024-06-07 PROCEDURE — 6360000002 HC RX W HCPCS: Performed by: INTERNAL MEDICINE

## 2024-06-07 PROCEDURE — 77386 HC NTSTY MODUL RAD TX DLVR CPLX: CPT | Performed by: RADIOLOGY

## 2024-06-07 PROCEDURE — 2580000003 HC RX 258: Performed by: INTERNAL MEDICINE

## 2024-06-07 PROCEDURE — 96523 IRRIG DRUG DELIVERY DEVICE: CPT

## 2024-06-07 RX ORDER — HEPARIN 100 UNIT/ML
500 SYRINGE INTRAVENOUS PRN
Status: DISCONTINUED | OUTPATIENT
Start: 2024-06-07 | End: 2024-06-08 | Stop reason: HOSPADM

## 2024-06-07 RX ORDER — DIPHENHYDRAMINE HYDROCHLORIDE 50 MG/ML
50 INJECTION INTRAMUSCULAR; INTRAVENOUS
OUTPATIENT
Start: 2024-06-07

## 2024-06-07 RX ORDER — HEPARIN 100 UNIT/ML
500 SYRINGE INTRAVENOUS PRN
OUTPATIENT
Start: 2024-06-07

## 2024-06-07 RX ORDER — ACETAMINOPHEN 325 MG/1
650 TABLET ORAL
OUTPATIENT
Start: 2024-06-07

## 2024-06-07 RX ORDER — SODIUM CHLORIDE 9 MG/ML
25 INJECTION, SOLUTION INTRAVENOUS PRN
OUTPATIENT
Start: 2024-06-07

## 2024-06-07 RX ORDER — SODIUM CHLORIDE 0.9 % (FLUSH) 0.9 %
5-40 SYRINGE (ML) INJECTION PRN
Status: DISCONTINUED | OUTPATIENT
Start: 2024-06-07 | End: 2024-06-08 | Stop reason: HOSPADM

## 2024-06-07 RX ORDER — ALBUTEROL SULFATE 90 UG/1
4 AEROSOL, METERED RESPIRATORY (INHALATION) PRN
OUTPATIENT
Start: 2024-06-07

## 2024-06-07 RX ORDER — EPINEPHRINE 1 MG/ML
0.3 INJECTION, SOLUTION INTRAMUSCULAR; SUBCUTANEOUS PRN
OUTPATIENT
Start: 2024-06-07

## 2024-06-07 RX ORDER — SODIUM CHLORIDE 0.9 % (FLUSH) 0.9 %
5-40 SYRINGE (ML) INJECTION PRN
OUTPATIENT
Start: 2024-06-07

## 2024-06-07 RX ORDER — ONDANSETRON 2 MG/ML
8 INJECTION INTRAMUSCULAR; INTRAVENOUS
OUTPATIENT
Start: 2024-06-07

## 2024-06-07 RX ORDER — SODIUM CHLORIDE 9 MG/ML
INJECTION, SOLUTION INTRAVENOUS CONTINUOUS
OUTPATIENT
Start: 2024-06-07

## 2024-06-07 RX ADMIN — HEPARIN 500 UNITS: 100 SYRINGE at 11:29

## 2024-06-07 RX ADMIN — SODIUM CHLORIDE, PRESERVATIVE FREE 10 ML: 5 INJECTION INTRAVENOUS at 11:29

## 2024-06-07 NOTE — DISCHARGE INSTRUCTIONS
Please contact your Oncologist if you have any questions regarding the pump off that you received today.    You are instructed to call the office or go to the Emergency Dept. If you experience any of the following symptoms:    Dizziness/lightheadedness   Acute nausea or vomiting-not relieved by medications  Headaches-not relieved by medications  New chest pain or pressure  New rash /itching  New shortness of breath  Fever,chills or signs or symptoms of infection    Make sure you are drinking 48 to 64 ounces of water daily-if you are unable to drink fluids let us know right away.

## 2024-06-07 NOTE — PLAN OF CARE
Problem: Safety - Adult  Goal: Free from fall injury  Outcome: Adequate for Discharge  Flowsheets (Taken 6/7/2024 1017)  Free From Fall Injury: Instruct family/caregiver on patient safety  Note: Patient free of falls this visit.      Problem: Discharge Planning  Goal: Discharge to home or other facility with appropriate resources  Outcome: Adequate for Discharge  Flowsheets (Taken 6/7/2024 1017)  Discharge to home or other facility with appropriate resources:   Identify barriers to discharge with patient and caregiver   Identify discharge learning needs (meds, wound care, etc)  Note: Patient verbalizes understanding of discharge instructions, follow up appointment, and when to call physician if needed     Problem: Infection - Adult  Goal: Absence of infection at discharge  Outcome: Adequate for Discharge  Flowsheets (Taken 6/7/2024 1017)  Absence of infection at discharge:   Assess and monitor for signs and symptoms of infection   Monitor all insertion sites i.e., indwelling lines, tubes and drains  Note: Mediport site with no redness or warmth. Skin over port site intact with no signs of breakdown noted. Patient verbalizes signs/symptoms of port infection and when to notify the physician.     Care plan reviewed with patient.  Patient verbalizes understanding of the plan of care and contributes to goal setting.

## 2024-06-07 NOTE — PROGRESS NOTES
Patient tolerated pump off without any complications.  Last vital signs  /83   Pulse 86   Temp 98.1 °F (36.7 °C) (Oral)   Resp 18   Ht 1.524 m (5')   Wt 68 kg (150 lb)   SpO2 96%   BMI 29.29 kg/m²     Discharge instructions given to patient, Verbalizes understanding. Ambulated off unit per self in stable condition with all belongings.

## 2024-06-10 ENCOUNTER — HOSPITAL ENCOUNTER (OUTPATIENT)
Dept: RADIATION ONCOLOGY | Age: 74
Discharge: HOME | End: 2024-06-10
Payer: MEDICARE

## 2024-06-10 PROCEDURE — 77336 RADIATION PHYSICS CONSULT: CPT | Performed by: RADIOLOGY

## 2024-06-10 PROCEDURE — 77386 HC NTSTY MODUL RAD TX DLVR CPLX: CPT | Performed by: RADIOLOGY

## 2024-06-10 PROCEDURE — 77014 CHG CT GUIDANCE RADIATION THERAPY FLDS PLACEMENT: CPT | Performed by: RADIOLOGY

## 2024-06-11 ENCOUNTER — HOSPITAL ENCOUNTER (OUTPATIENT)
Dept: RADIATION ONCOLOGY | Age: 74
Discharge: HOME | End: 2024-06-11
Payer: MEDICARE

## 2024-06-11 VITALS
OXYGEN SATURATION: 95 % | WEIGHT: 149.91 LBS | TEMPERATURE: 98 F | BODY MASS INDEX: 29.28 KG/M2 | DIASTOLIC BLOOD PRESSURE: 88 MMHG | RESPIRATION RATE: 18 BRPM | HEART RATE: 101 BPM | SYSTOLIC BLOOD PRESSURE: 124 MMHG

## 2024-06-11 PROCEDURE — 77386 HC NTSTY MODUL RAD TX DLVR CPLX: CPT | Performed by: RADIOLOGY

## 2024-06-11 PROCEDURE — 77014 CHG CT GUIDANCE RADIATION THERAPY FLDS PLACEMENT: CPT | Performed by: RADIOLOGY

## 2024-06-11 NOTE — PROGRESS NOTES
None  Nocturia: >6 times nightly - Baseline  Proctitis: None  Diarrhea: None - Controlled with Imodium    Additional Comments: Using Aquaphor after bath at bedtime. Advised to start betamethasone BID to bilateral groins and rectal area up to coccyx.     MEDICATIONS:     Current Outpatient Medications   Medication Sig Dispense Refill    FIBER PO Take 1 capsule by mouth in the morning and at bedtime      OLANZapine zydis (ZYPREXA) 5 MG disintegrating tablet Take 1 tablet by mouth nightly for 5 days 5 tablet 0    calcium carbonate 600 MG TABS tablet Take 1 tablet by mouth in the morning and at bedtime      diphenoxylate-atropine (LOMOTIL) 2.5-0.025 MG per tablet Take 2 tablets by mouth 4 times daily as needed for Diarrhea.      amLODIPine (NORVASC) 5 MG tablet Take 1 tablet by mouth at bedtime      Multiple Vitamins-Minerals (THERAPEUTIC MULTIVITAMIN-MINERALS) tablet Take 1 tablet by mouth daily      Omega-3 Fatty Acids (OMEGA 3 500 PO) Take 500 mg by mouth daily       No current facility-administered medications for this encounter.       PHYSICAL EXAM:       ECO - Symptomatic but completely ambulatory (Restricted in physically strenuous activity but ambulatory and able to carry out work of a light or sedentary nature. For example, light housework, office work)     General: NAD, AO x 3, Mentation is clear with appropriate affect.  HEENT:  Noted small vesicle inner lip, lower mouth   Thorax:  Unlabored  Abdomen:  Non-distended  /Rectal: No perianal/perineal skin breakdown.  Skin - treatment portal: SEE above.  No changes in treatment area. Noted Erythematous papules on the upper chest bilaterally.    Chemotherapy Update: Concurrent chemotherapy    Treatment Imaging: CBCT and All imaging reviewed and approved by Dr. Amezquita/    ASSESSMENT: No significant radiation side effects. Responding appropriately to symptomatic management.    New medications, diagnostic results: Not applicable and Continue treatment

## 2024-06-12 ENCOUNTER — HOSPITAL ENCOUNTER (OUTPATIENT)
Dept: RADIATION ONCOLOGY | Age: 74
Discharge: HOME | End: 2024-06-12
Payer: MEDICARE

## 2024-06-12 PROCEDURE — 77386 HC NTSTY MODUL RAD TX DLVR CPLX: CPT | Performed by: RADIOLOGY

## 2024-06-13 ENCOUNTER — HOSPITAL ENCOUNTER (OUTPATIENT)
Dept: RADIATION ONCOLOGY | Age: 74
Discharge: HOME | End: 2024-06-13
Payer: MEDICARE

## 2024-06-13 DIAGNOSIS — C21.1 SQUAMOUS CELL CARCINOMA OF ANAL CANAL (HCC): Primary | ICD-10-CM

## 2024-06-13 PROCEDURE — 77386 HC NTSTY MODUL RAD TX DLVR CPLX: CPT | Performed by: RADIOLOGY

## 2024-06-14 ENCOUNTER — HOSPITAL ENCOUNTER (OUTPATIENT)
Dept: RADIATION ONCOLOGY | Age: 74
Discharge: HOME | End: 2024-06-14
Payer: MEDICARE

## 2024-06-14 ENCOUNTER — HOSPITAL ENCOUNTER (OUTPATIENT)
Dept: INFUSION THERAPY | Age: 74
Discharge: HOME | End: 2024-06-14
Payer: MEDICARE

## 2024-06-14 ENCOUNTER — OFFICE VISIT (OUTPATIENT)
Dept: ONCOLOGY | Age: 74
End: 2024-06-14
Payer: MEDICARE

## 2024-06-14 VITALS
DIASTOLIC BLOOD PRESSURE: 91 MMHG | SYSTOLIC BLOOD PRESSURE: 133 MMHG | TEMPERATURE: 98.6 F | RESPIRATION RATE: 16 BRPM | HEART RATE: 94 BPM | OXYGEN SATURATION: 95 %

## 2024-06-14 VITALS
TEMPERATURE: 98.6 F | HEART RATE: 94 BPM | BODY MASS INDEX: 29.06 KG/M2 | SYSTOLIC BLOOD PRESSURE: 133 MMHG | OXYGEN SATURATION: 95 % | RESPIRATION RATE: 16 BRPM | WEIGHT: 148 LBS | DIASTOLIC BLOOD PRESSURE: 91 MMHG | HEIGHT: 60 IN

## 2024-06-14 VITALS
TEMPERATURE: 97.8 F | OXYGEN SATURATION: 97 % | HEART RATE: 95 BPM | SYSTOLIC BLOOD PRESSURE: 130 MMHG | DIASTOLIC BLOOD PRESSURE: 91 MMHG | RESPIRATION RATE: 16 BRPM

## 2024-06-14 DIAGNOSIS — R63.0 ANOREXIA: ICD-10-CM

## 2024-06-14 DIAGNOSIS — C21.1 SQUAMOUS CELL CARCINOMA OF ANAL CANAL (HCC): ICD-10-CM

## 2024-06-14 DIAGNOSIS — K12.31 MUCOSITIS DUE TO ANTINEOPLASTIC THERAPY: Primary | ICD-10-CM

## 2024-06-14 PROCEDURE — 99214 OFFICE O/P EST MOD 30 MIN: CPT | Performed by: SPECIALIST

## 2024-06-14 PROCEDURE — 99211 OFF/OP EST MAY X REQ PHY/QHP: CPT

## 2024-06-14 PROCEDURE — 3075F SYST BP GE 130 - 139MM HG: CPT | Performed by: SPECIALIST

## 2024-06-14 PROCEDURE — 1123F ACP DISCUSS/DSCN MKR DOCD: CPT | Performed by: SPECIALIST

## 2024-06-14 PROCEDURE — 77386 HC NTSTY MODUL RAD TX DLVR CPLX: CPT | Performed by: RADIOLOGY

## 2024-06-14 PROCEDURE — 3080F DIAST BP >= 90 MM HG: CPT | Performed by: SPECIALIST

## 2024-06-14 RX ORDER — SUCRALFATE ORAL 1 G/10ML
1 SUSPENSION ORAL
Qty: 1200 ML | Refills: 1 | Status: SHIPPED | OUTPATIENT
Start: 2024-06-14

## 2024-06-14 RX ORDER — DEXAMETHASONE 0.5 MG/5ML
2 ELIXIR ORAL DAILY
Qty: 500 ML | Refills: 5 | Status: SHIPPED | OUTPATIENT
Start: 2024-06-14

## 2024-06-14 ASSESSMENT — PAIN DESCRIPTION - LOCATION: LOCATION: THROAT

## 2024-06-14 NOTE — PROGRESS NOTES
[facility]  [facility]  [address]  [address]  [place] [address]  Dept: [phone]  Loc: [phone]   Hematology/Oncology Progress Note (Clinic)        Tiffany CURRY Nicolasa  1950    6/14/2024     Referring Provider: No ref. 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, Stage IIA (cT2, cN0, cM0)    Encounter for Mucositis management      PLAN:   Discussed Magic mouthwash 10 mL before every meal and at bedtime, swish and spit    Dexamethasone 2 mg elixir before every meal and at bedtime, patient agrees    After the above to, sucralfate 10 mL before every meal and at bedtime swish and swallow, patient agrees    Patient is to keep her follow-up on 07/01/2024 for last week of chemotherapy, and MD visit, with labs as ordered    Olanzapine discontinued, as patient had significant side effects, and she has no complaints of nausea    Encouraged her to keep eating soft food or puréed food as tolerated, and protein drinks - she agrees and is trying    I spent 30 minutes discussing treatment and the side effects, and answered all patient's questions to her satisfaction    Reason for Visit:   Chief Complaint   Patient presents with    Follow-up     Anal cancer (      Oncology History   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

## 2024-06-14 NOTE — PATIENT INSTRUCTIONS
Magic Mouthwash 10 ml Q AC & HS - ORDERED  Dexamethasone 2 mg elixir Q AC & HS - ORDERED  Sucralfate 10 ml  Q AC & HS - ORDERED  D/C Olanzepine  Follow-up last week of Radiation as scheduled with Labs   Call with any new problems

## 2024-06-17 ENCOUNTER — HOSPITAL ENCOUNTER (OUTPATIENT)
Dept: RADIATION ONCOLOGY | Age: 74
Discharge: HOME | End: 2024-06-17
Payer: MEDICARE

## 2024-06-17 PROCEDURE — 77386 HC NTSTY MODUL RAD TX DLVR CPLX: CPT | Performed by: RADIOLOGY

## 2024-06-17 PROCEDURE — 77014 CHG CT GUIDANCE RADIATION THERAPY FLDS PLACEMENT: CPT | Performed by: RADIOLOGY

## 2024-06-18 ENCOUNTER — HOSPITAL ENCOUNTER (OUTPATIENT)
Dept: RADIATION ONCOLOGY | Age: 74
Discharge: HOME | End: 2024-06-18
Payer: MEDICARE

## 2024-06-18 VITALS
HEART RATE: 93 BPM | OXYGEN SATURATION: 96 % | WEIGHT: 147.71 LBS | BODY MASS INDEX: 28.85 KG/M2 | DIASTOLIC BLOOD PRESSURE: 71 MMHG | SYSTOLIC BLOOD PRESSURE: 142 MMHG | TEMPERATURE: 97.4 F | RESPIRATION RATE: 16 BRPM

## 2024-06-18 PROCEDURE — 77386 HC NTSTY MODUL RAD TX DLVR CPLX: CPT | Performed by: RADIOLOGY

## 2024-06-18 PROCEDURE — 77014 CHG CT GUIDANCE RADIATION THERAPY FLDS PLACEMENT: CPT | Performed by: RADIOLOGY

## 2024-06-18 NOTE — PROGRESS NOTES
[facility]          [address]        [place] [id number]        O: [phone]        F: [phone]       [employer]            Dr. Robbin Chris MD MS          Dr. 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 Dose (cGy): 5400    Patient was seen today for weekly visit.     Wt Readings from Last 3 Encounters:   24 67 kg (147 lb 11.3 oz)   24 67.1 kg (148 lb)   24 68 kg (149 lb 14.6 oz)       BP (!) 142/71   Pulse 93   Temp 97.4 °F (36.3 °C) (Infrared)   Resp 16   Wt 67 kg (147 lb 11.3 oz)   SpO2 96%   BMI 28.85 kg/m²     Lab Results   Component Value Date    WBC 4.4 (L) 2024    HGB 14.4 2024    HCT 43.1 2024     2024       Comfort Alteration  Fatigue:Able to perform daily activities with rest periods    Pain Location: Denies  Pain Intensity (Current): 0 No Pain  Pain Treatment: N/A  Pain Relief: n/a    Emotional Alteration:   Coping: effective    Nutritional Alteration  Anorexia: Loss of appetite but able to eat smaller portions of food and/or liquids  Nausea: No nausea noted  Vomiting: No vomiting     Skin Alteration   Skin reaction:  Rash on upper front and back chest after starting Olanzapine mostly resolved. No changes in groin area    Elimination Alterations  Urinary Frequency: None  Urinary Retention: Normal  Urinary Incontinence: Stress

## 2024-06-19 ENCOUNTER — HOSPITAL ENCOUNTER (OUTPATIENT)
Dept: RADIATION ONCOLOGY | Age: 74
Discharge: HOME | End: 2024-06-19
Payer: MEDICARE

## 2024-06-19 PROCEDURE — 77014 CHG CT GUIDANCE RADIATION THERAPY FLDS PLACEMENT: CPT | Performed by: RADIOLOGY

## 2024-06-19 PROCEDURE — 77386 HC NTSTY MODUL RAD TX DLVR CPLX: CPT | Performed by: RADIOLOGY

## 2024-06-20 ENCOUNTER — HOSPITAL ENCOUNTER (OUTPATIENT)
Dept: RADIATION ONCOLOGY | Age: 74
Discharge: HOME | End: 2024-06-20
Payer: MEDICARE

## 2024-06-20 PROCEDURE — 77386 HC NTSTY MODUL RAD TX DLVR CPLX: CPT | Performed by: RADIOLOGY

## 2024-06-20 PROCEDURE — 77014 CHG CT GUIDANCE RADIATION THERAPY FLDS PLACEMENT: CPT | Performed by: RADIOLOGY

## 2024-06-21 ENCOUNTER — HOSPITAL ENCOUNTER (OUTPATIENT)
Dept: RADIATION ONCOLOGY | Age: 74
Discharge: HOME | End: 2024-06-21
Payer: MEDICARE

## 2024-06-21 PROCEDURE — 77427 RADIATION TX MANAGEMENT X5: CPT | Performed by: RADIOLOGY

## 2024-06-21 PROCEDURE — 77386 HC NTSTY MODUL RAD TX DLVR CPLX: CPT | Performed by: RADIOLOGY

## 2024-06-24 ENCOUNTER — HOSPITAL ENCOUNTER (OUTPATIENT)
Dept: RADIATION ONCOLOGY | Age: 74
Discharge: HOME | End: 2024-06-24
Payer: MEDICARE

## 2024-06-24 PROCEDURE — 77014 CHG CT GUIDANCE RADIATION THERAPY FLDS PLACEMENT: CPT | Performed by: RADIOLOGY

## 2024-06-24 PROCEDURE — 77336 RADIATION PHYSICS CONSULT: CPT | Performed by: RADIOLOGY

## 2024-06-24 PROCEDURE — 77386 HC NTSTY MODUL RAD TX DLVR CPLX: CPT | Performed by: RADIOLOGY

## 2024-06-25 ENCOUNTER — HOSPITAL ENCOUNTER (OUTPATIENT)
Dept: RADIATION ONCOLOGY | Age: 74
Discharge: HOME | End: 2024-06-25
Payer: MEDICARE

## 2024-06-25 VITALS
SYSTOLIC BLOOD PRESSURE: 123 MMHG | WEIGHT: 145.94 LBS | OXYGEN SATURATION: 98 % | RESPIRATION RATE: 16 BRPM | DIASTOLIC BLOOD PRESSURE: 71 MMHG | TEMPERATURE: 97.8 F | BODY MASS INDEX: 28.5 KG/M2 | HEART RATE: 78 BPM

## 2024-06-25 PROCEDURE — 77014 CHG CT GUIDANCE RADIATION THERAPY FLDS PLACEMENT: CPT | Performed by: RADIOLOGY

## 2024-06-25 PROCEDURE — 77386 HC NTSTY MODUL RAD TX DLVR CPLX: CPT | Performed by: RADIOLOGY

## 2024-06-25 NOTE — PROGRESS NOTES
[facility]          [address], [address]        [place] [id number]        O: [phone]        F: [phone]       [employer]            Dr. Robbin Chris MD MS          Dr. 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 Dose (cGy): 5400    Patient was seen today for weekly visit.     Wt Readings from Last 3 Encounters:   24 66.2 kg (145 lb 15.1 oz)   24 67 kg (147 lb 11.3 oz)   24 67.1 kg (148 lb)       /71   Pulse 78   Temp 97.8 °F (36.6 °C) (Infrared)   Resp 16   Wt 66.2 kg (145 lb 15.1 oz)   SpO2 98%   BMI 28.50 kg/m²     Lab Results   Component Value Date    WBC 4.4 (L) 2024    HGB 14.4 2024    HCT 43.1 2024     2024       Comfort Alteration  Fatigue:Must curtail daily activities even with rest periods and early bedtime    Pain Location: Denies  Pain Intensity (Current): 0 No Pain  Pain Treatment: N/A  Pain Relief: n/a    Emotional Alteration:   Coping: effective    Nutritional Alteration  Anorexia: Loss of appetite but able to eat smaller portions of food and/or liquids  Nausea: No nausea noted  Vomiting: No vomiting     Skin Alteration   Skin reaction: Faint or dull erythema; follicular reaction    Elimination Alterations  Urinary Frequency: None  Urinary Retention: Normal  Urinary Incontinence: Stress incontinence  Dysuria: None  Nocturia: >6 times

## 2024-06-26 ENCOUNTER — HOSPITAL ENCOUNTER (OUTPATIENT)
Dept: RADIATION ONCOLOGY | Age: 74
Discharge: HOME | End: 2024-06-26
Payer: MEDICARE

## 2024-06-26 PROCEDURE — 77386 HC NTSTY MODUL RAD TX DLVR CPLX: CPT | Performed by: RADIOLOGY

## 2024-06-26 PROCEDURE — 77014 CHG CT GUIDANCE RADIATION THERAPY FLDS PLACEMENT: CPT | Performed by: RADIOLOGY

## 2024-06-27 ENCOUNTER — HOSPITAL ENCOUNTER (OUTPATIENT)
Dept: RADIATION ONCOLOGY | Age: 74
Discharge: HOME | End: 2024-06-27
Payer: MEDICARE

## 2024-06-27 PROCEDURE — 77014 CHG CT GUIDANCE RADIATION THERAPY FLDS PLACEMENT: CPT | Performed by: RADIOLOGY

## 2024-06-27 PROCEDURE — 77386 HC NTSTY MODUL RAD TX DLVR CPLX: CPT | Performed by: RADIOLOGY

## 2024-06-28 ENCOUNTER — HOSPITAL ENCOUNTER (OUTPATIENT)
Dept: RADIATION ONCOLOGY | Age: 74
Discharge: HOME | End: 2024-06-28
Payer: MEDICARE

## 2024-06-28 PROCEDURE — 77386 HC NTSTY MODUL RAD TX DLVR CPLX: CPT | Performed by: RADIOLOGY

## 2024-07-01 ENCOUNTER — HOSPITAL ENCOUNTER (OUTPATIENT)
Dept: INFUSION THERAPY | Age: 74
Discharge: HOME | End: 2024-07-01
Payer: MEDICARE

## 2024-07-01 ENCOUNTER — OFFICE VISIT (OUTPATIENT)
Dept: ONCOLOGY | Age: 74
End: 2024-07-01

## 2024-07-01 ENCOUNTER — HOSPITAL ENCOUNTER (OUTPATIENT)
Dept: RADIATION ONCOLOGY | Age: 74
Discharge: HOME | End: 2024-07-01
Payer: MEDICARE

## 2024-07-01 VITALS
SYSTOLIC BLOOD PRESSURE: 120 MMHG | BODY MASS INDEX: 28.23 KG/M2 | TEMPERATURE: 97.8 F | RESPIRATION RATE: 16 BRPM | DIASTOLIC BLOOD PRESSURE: 78 MMHG | HEIGHT: 60 IN | HEART RATE: 86 BPM | OXYGEN SATURATION: 96 % | WEIGHT: 143.8 LBS

## 2024-07-01 VITALS
SYSTOLIC BLOOD PRESSURE: 120 MMHG | TEMPERATURE: 97.8 F | HEART RATE: 86 BPM | OXYGEN SATURATION: 96 % | BODY MASS INDEX: 28.23 KG/M2 | DIASTOLIC BLOOD PRESSURE: 78 MMHG | HEIGHT: 60 IN | RESPIRATION RATE: 16 BRPM | WEIGHT: 143.8 LBS

## 2024-07-01 DIAGNOSIS — C21.1 SQUAMOUS CELL CARCINOMA OF ANAL CANAL (HCC): Primary | ICD-10-CM

## 2024-07-01 DIAGNOSIS — Z51.11 ENCOUNTER FOR CHEMOTHERAPY MANAGEMENT: ICD-10-CM

## 2024-07-01 DIAGNOSIS — C21.0 ANAL CANCER (HCC): ICD-10-CM

## 2024-07-01 DIAGNOSIS — K12.31 MUCOSITIS DUE TO ANTINEOPLASTIC THERAPY: ICD-10-CM

## 2024-07-01 LAB
ALBUMIN SERPL BCG-MCNC: 4 G/DL (ref 3.5–5.1)
ALP SERPL-CCNC: 57 U/L (ref 38–126)
ALT SERPL W/O P-5'-P-CCNC: 12 U/L (ref 11–66)
AST SERPL-CCNC: 19 U/L (ref 5–40)
BASOPHILS ABSOLUTE: 0 THOU/MM3 (ref 0–0.1)
BASOPHILS NFR BLD AUTO: 0 % (ref 0–3)
BILIRUB CONJ SERPL-MCNC: 0.2 MG/DL (ref 0.1–13.8)
BILIRUB SERPL-MCNC: 0.5 MG/DL (ref 0.3–1.2)
BUN BLDP-MCNC: 11 MG/DL (ref 8–26)
CHLORIDE BLD-SCNC: 107 MEQ/L (ref 98–109)
CREAT BLD-MCNC: 0.6 MG/DL (ref 0.5–1.2)
EOSINOPHIL NFR BLD AUTO: 8 % (ref 0–4)
EOSINOPHILS ABSOLUTE: 0.2 THOU/MM3 (ref 0–0.4)
ERYTHROCYTE [DISTWIDTH] IN BLOOD BY AUTOMATED COUNT: 13.5 % (ref 11.5–14.5)
GFR SERPL CREATININE-BSD FRML MDRD: > 90 ML/MIN/1.73M2
GLUCOSE BLD-MCNC: 109 MG/DL (ref 70–108)
HCT VFR BLD AUTO: 36.3 % (ref 37–47)
HGB BLD-MCNC: 12.3 GM/DL (ref 12–16)
IMMATURE GRANULOCYTES %: 0 %
IMMATURE GRANULOCYTES ABSOLUTE: 0.01 THOU/MM3 (ref 0–0.07)
IONIZED CALCIUM, WHOLE BLOOD: 1.18 MMOL/L (ref 1.12–1.32)
LYMPHOCYTES ABSOLUTE: 0.2 THOU/MM3 (ref 1–4.8)
LYMPHOCYTES NFR BLD AUTO: 6 % (ref 15–47)
MAGNESIUM SERPL-MCNC: 2 MG/DL (ref 1.6–2.4)
MCH RBC QN AUTO: 30.6 PG (ref 26–33)
MCHC RBC AUTO-ENTMCNC: 33.9 GM/DL (ref 32.2–35.5)
MCV RBC AUTO: 90 FL (ref 81–99)
MONOCYTES ABSOLUTE: 0.5 THOU/MM3 (ref 0.4–1.3)
MONOCYTES NFR BLD AUTO: 22 % (ref 0–12)
NEUTROPHILS ABSOLUTE: 1.6 THOU/MM3 (ref 1.8–7.7)
NEUTROPHILS NFR BLD AUTO: 64 % (ref 43–75)
PLATELET # BLD AUTO: 162 THOU/MM3 (ref 130–400)
PMV BLD AUTO: 8.8 FL (ref 9.4–12.4)
POTASSIUM BLD-SCNC: 3.6 MEQ/L (ref 3.5–4.9)
PROT SERPL-MCNC: 6.5 G/DL (ref 6.1–8)
RBC # BLD AUTO: 4.02 MILL/MM3 (ref 4.2–5.4)
SODIUM BLD-SCNC: 140 MEQ/L (ref 138–146)
TOTAL CO2, WHOLE BLOOD: 25 MEQ/L (ref 23–33)
WBC # BLD AUTO: 2.5 THOU/MM3 (ref 4.8–10.8)

## 2024-07-01 PROCEDURE — 36591 DRAW BLOOD OFF VENOUS DEVICE: CPT

## 2024-07-01 PROCEDURE — 96374 THER/PROPH/DIAG INJ IV PUSH: CPT

## 2024-07-01 PROCEDURE — 96409 CHEMO IV PUSH SNGL DRUG: CPT

## 2024-07-01 PROCEDURE — 80047 BASIC METABLC PNL IONIZED CA: CPT

## 2024-07-01 PROCEDURE — 77014 CHG CT GUIDANCE RADIATION THERAPY FLDS PLACEMENT: CPT | Performed by: RADIOLOGY

## 2024-07-01 PROCEDURE — 77386 HC NTSTY MODUL RAD TX DLVR CPLX: CPT | Performed by: RADIOLOGY

## 2024-07-01 PROCEDURE — 83735 ASSAY OF MAGNESIUM: CPT

## 2024-07-01 PROCEDURE — 85025 COMPLETE CBC W/AUTO DIFF WBC: CPT

## 2024-07-01 PROCEDURE — 96416 CHEMO PROLONG INFUSE W/PUMP: CPT

## 2024-07-01 PROCEDURE — 96375 TX/PRO/DX INJ NEW DRUG ADDON: CPT

## 2024-07-01 PROCEDURE — 99211 OFF/OP EST MAY X REQ PHY/QHP: CPT

## 2024-07-01 PROCEDURE — 2580000003 HC RX 258: Performed by: INTERNAL MEDICINE

## 2024-07-01 PROCEDURE — 6360000002 HC RX W HCPCS: Performed by: INTERNAL MEDICINE

## 2024-07-01 PROCEDURE — 80076 HEPATIC FUNCTION PANEL: CPT

## 2024-07-01 PROCEDURE — 77336 RADIATION PHYSICS CONSULT: CPT | Performed by: RADIOLOGY

## 2024-07-01 RX ORDER — ALBUTEROL SULFATE 90 UG/1
4 AEROSOL, METERED RESPIRATORY (INHALATION) PRN
Status: CANCELLED | OUTPATIENT
Start: 2024-07-01

## 2024-07-01 RX ORDER — ONDANSETRON 2 MG/ML
8 INJECTION INTRAMUSCULAR; INTRAVENOUS ONCE
Status: COMPLETED | OUTPATIENT
Start: 2024-07-01 | End: 2024-07-01

## 2024-07-01 RX ORDER — DIPHENHYDRAMINE HYDROCHLORIDE 50 MG/ML
50 INJECTION INTRAMUSCULAR; INTRAVENOUS
Status: CANCELLED | OUTPATIENT
Start: 2024-07-01

## 2024-07-01 RX ORDER — SODIUM CHLORIDE 9 MG/ML
5-250 INJECTION, SOLUTION INTRAVENOUS PRN
Status: CANCELLED | OUTPATIENT
Start: 2024-07-01

## 2024-07-01 RX ORDER — FAMOTIDINE 10 MG/ML
20 INJECTION, SOLUTION INTRAVENOUS
Status: CANCELLED | OUTPATIENT
Start: 2024-07-01

## 2024-07-01 RX ORDER — HEPARIN 100 UNIT/ML
500 SYRINGE INTRAVENOUS PRN
Status: DISCONTINUED | OUTPATIENT
Start: 2024-07-01 | End: 2024-07-02 | Stop reason: HOSPADM

## 2024-07-01 RX ORDER — PROCHLORPERAZINE EDISYLATE 5 MG/ML
10 INJECTION INTRAMUSCULAR; INTRAVENOUS
Status: CANCELLED | OUTPATIENT
Start: 2024-07-01

## 2024-07-01 RX ORDER — EPINEPHRINE 1 MG/ML
0.3 INJECTION, SOLUTION INTRAMUSCULAR; SUBCUTANEOUS PRN
Status: CANCELLED | OUTPATIENT
Start: 2024-07-01

## 2024-07-01 RX ORDER — SODIUM CHLORIDE 9 MG/ML
5-250 INJECTION, SOLUTION INTRAVENOUS PRN
Status: DISCONTINUED | OUTPATIENT
Start: 2024-07-01 | End: 2024-07-02 | Stop reason: HOSPADM

## 2024-07-01 RX ORDER — ACETAMINOPHEN 325 MG/1
650 TABLET ORAL
Status: CANCELLED | OUTPATIENT
Start: 2024-07-01

## 2024-07-01 RX ORDER — MITOMYCIN 5 MG/10ML
10 INJECTION, POWDER, LYOPHILIZED, FOR SOLUTION INTRAVENOUS ONCE
Status: CANCELLED | OUTPATIENT
Start: 2024-07-01 | End: 2024-07-01

## 2024-07-01 RX ORDER — ONDANSETRON 2 MG/ML
8 INJECTION INTRAMUSCULAR; INTRAVENOUS ONCE
Status: CANCELLED | OUTPATIENT
Start: 2024-07-01 | End: 2024-07-01

## 2024-07-01 RX ORDER — SODIUM CHLORIDE 9 MG/ML
INJECTION, SOLUTION INTRAVENOUS CONTINUOUS
Status: CANCELLED | OUTPATIENT
Start: 2024-07-01

## 2024-07-01 RX ORDER — MITOMYCIN 20 MG/40ML
10 INJECTION, POWDER, LYOPHILIZED, FOR SOLUTION INTRAVENOUS ONCE
Status: COMPLETED | OUTPATIENT
Start: 2024-07-01 | End: 2024-07-01

## 2024-07-01 RX ORDER — ONDANSETRON 2 MG/ML
8 INJECTION INTRAMUSCULAR; INTRAVENOUS
Status: CANCELLED | OUTPATIENT
Start: 2024-07-01

## 2024-07-01 RX ORDER — EPINEPHRINE 1 MG/ML
0.3 INJECTION, SOLUTION, CONCENTRATE INTRAVENOUS PRN
Status: CANCELLED | OUTPATIENT
Start: 2024-07-01

## 2024-07-01 RX ORDER — HEPARIN 100 UNIT/ML
500 SYRINGE INTRAVENOUS PRN
Status: CANCELLED | OUTPATIENT
Start: 2024-07-01

## 2024-07-01 RX ORDER — HEPARIN SODIUM (PORCINE) LOCK FLUSH IV SOLN 100 UNIT/ML 100 UNIT/ML
500 SOLUTION INTRAVENOUS PRN
Status: CANCELLED | OUTPATIENT
Start: 2024-07-01

## 2024-07-01 RX ORDER — SODIUM CHLORIDE 0.9 % (FLUSH) 0.9 %
5-40 SYRINGE (ML) INJECTION PRN
Status: DISCONTINUED | OUTPATIENT
Start: 2024-07-01 | End: 2024-07-02 | Stop reason: HOSPADM

## 2024-07-01 RX ORDER — SODIUM CHLORIDE 0.9 % (FLUSH) 0.9 %
5-40 SYRINGE (ML) INJECTION PRN
Status: CANCELLED | OUTPATIENT
Start: 2024-07-01

## 2024-07-01 RX ORDER — SODIUM CHLORIDE 9 MG/ML
25 INJECTION, SOLUTION INTRAVENOUS PRN
Status: CANCELLED | OUTPATIENT
Start: 2024-07-01

## 2024-07-01 RX ORDER — LORAZEPAM 2 MG/ML
0.5 INJECTION INTRAMUSCULAR
Status: CANCELLED | OUTPATIENT
Start: 2024-07-01

## 2024-07-01 RX ORDER — MEPERIDINE HYDROCHLORIDE 50 MG/ML
12.5 INJECTION INTRAMUSCULAR; INTRAVENOUS; SUBCUTANEOUS PRN
Status: CANCELLED | OUTPATIENT
Start: 2024-07-01

## 2024-07-01 RX ADMIN — SODIUM CHLORIDE 40 ML/HR: 9 INJECTION, SOLUTION INTRAVENOUS at 11:45

## 2024-07-01 RX ADMIN — ONDANSETRON 8 MG: 2 INJECTION INTRAMUSCULAR; INTRAVENOUS at 11:45

## 2024-07-01 RX ADMIN — SODIUM CHLORIDE, PRESERVATIVE FREE 30 ML: 5 INJECTION INTRAVENOUS at 09:15

## 2024-07-01 RX ADMIN — MITOMYCIN 17 MG: 20 INJECTION, POWDER, LYOPHILIZED, FOR SOLUTION INTRAVENOUS at 12:48

## 2024-07-01 RX ADMIN — FLUOROURACIL 6500 MG: 50 INJECTION, SOLUTION INTRAVENOUS at 13:46

## 2024-07-01 NOTE — PROGRESS NOTES
Patient tolerated Mitomycin and 5 FU CADD pump without any complications.  Denies dizziness, lightheadedness, acute nausea or vomiting, headache, heart palpitations, rash/itching or increased SOB.    Last vital signs  /78   Pulse 86   Temp 97.8 °F (36.6 °C) (Oral)   Resp 16   Ht 1.524 m (5')   Wt 65.2 kg (143 lb 12.8 oz)   SpO2 96%   BMI 28.08 kg/m²     Patient instructed if they experience any of the above symptoms following today's visit, he/she is to notify the Physician or go to the Emergency Dept.    Discharge instructions given to patient, Verbalizes understanding. Ambulated off unit per self in stable condition with all belongings.

## 2024-07-01 NOTE — DISCHARGE INSTRUCTIONS
Proceed with chemotherapy today.  Return to clinic in 2 weeks and follow up with physician  Labs on return to clinic  Please call for questions or concerns.     Please contact your Oncologist if you have any questions regarding the Mitomycin and 5 FU CADD pump that you received today.    You are instructed to call the office or go to the Emergency Dept. If you experience any of the following symptoms:    Dizziness/lightheadedness   Acute nausea or vomiting-not relieved by medications  Headaches-not relieved by medications  New chest pain or pressure  New rash /itching  New shortness of breath  Fever,chills or signs or symptoms of infection    Make sure you are drinking 48 to 64 ounces of water daily-if you are unable to drink fluids let us know right away.

## 2024-07-01 NOTE — PLAN OF CARE
Problem: Discharge Planning  Goal: Discharge to home or other facility with appropriate resources  Outcome: Adequate for Discharge  Flowsheets (Taken 7/1/2024 1649)  Discharge to home or other facility with appropriate resources:   Identify barriers to discharge with patient and caregiver   Identify discharge learning needs (meds, wound care, etc)     Problem: Safety - Adult  Goal: Free from fall injury  Outcome: Adequate for Discharge  Flowsheets (Taken 7/1/2024 1649)  Free From Fall Injury:   Instruct family/caregiver on patient safety   Based on caregiver fall risk screen, instruct family/caregiver to ask for assistance with transferring infant if caregiver noted to have fall risk factors     Problem: Chronic Conditions and Co-morbidities  Goal: Patient's chronic conditions and co-morbidity symptoms are monitored and maintained or improved  Outcome: Adequate for Discharge  Flowsheets (Taken 7/1/2024 1649)  Care Plan - Patient's Chronic Conditions and Co-Morbidity Symptoms are Monitored and Maintained or Improved:   Monitor and assess patient's chronic conditions and comorbid symptoms for stability, deterioration, or improvement   Collaborate with multidisciplinary team to address chronic and comorbid conditions and prevent exacerbation or deterioration   Update acute care plan with appropriate goals if chronic or comorbid symptoms are exacerbated and prevent overall improvement and discharge  Note:   Chemotherapy Teaching     What is Chemotherapy   Drug action [x]   Method of Administration [x]   Handouts given []     Side Effects  Nausea/vomiting [x]   Diarrhea [x]   Fatigue [x]   Signs / Symptoms of infection [x]   Neutropenia [x]   Thrombocytopenia [x]   Alopecia [x]   neuropathy [x]   [place] diet &  the importance of fluids [x]       Micellaneous  Importance of nutrition [x]   Importance of oral hygiene [x]   When to call the MD [x]   Monitoring labs [x]   Use of supportive services []     Explanation of

## 2024-07-01 NOTE — PROGRESS NOTES
diphenoxylate-atropine (LOMOTIL) 2.5-0.025 MG per tablet Take 2 tablets by mouth 4 times daily as needed for Diarrhea.      amLODIPine (NORVASC) 5 MG tablet Take 1 tablet by mouth at bedtime      Multiple Vitamins-Minerals (THERAPEUTIC MULTIVITAMIN-MINERALS) tablet Take 1 tablet by mouth daily      Omega-3 Fatty Acids (OMEGA 3 500 PO) Take 500 mg by mouth daily      prochlorperazine (COMPAZINE) 10 MG tablet Take 1 tablet by mouth every 6 hours as needed (chemotherapy induced nausea) 120 tablet 1    ondansetron (ZOFRAN) 4 MG tablet Take 1 tablet by mouth 3 times daily as needed for Nausea or Vomiting 21 tablet 3     No current facility-administered medications for this visit.       OARRS: Controlled Substance Monitoring: NONE    ECO    DATA:  LAB: None today    IMAGING:    MRI PELVIS W WO CONTRAST    Result Date: 5/10/2024  1. The patient has undergone prior excisional biopsy of a lesion in the anal canal. However, there is a 3.4 x 2.4 x 1.5 cm area of intermediate T2 signal along the left anal wall occupying the 12 to 6 o'clock position. The abnormality involves the left intersphincteric fat and the left external anal sphincter. There is extension into the left puborectalis. 2. 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 Just anterior to the fistula is a 1 x 0.4 cm collection that can also relate to an abscess. 3. No significant lymphadenopathy is seen in the visualized pelvis 4. There is fluid within the endometrium. This can relate to cervical stenosis versus endometrial neoplasm. Direct evaluation is recommended. **This report has been created using voice recognition software. It may contain minor errors which are inherent in voice recognition technology.** Final report electronically signed by Dr Gabbi Barnard on 5/10/2024 6:20 PM    PET CT SKULL BASE TO MID THIGH    Result Date: 2024  1. FDG avid densities at the anus likely corresponding to

## 2024-07-01 NOTE — PATIENT INSTRUCTIONS
Proceed with chemotherapy today.  Return to clinic in 2 weeks and follow up with physician  Labs on return to clinic  Please call for questions or concerns.

## 2024-07-02 ENCOUNTER — HOSPITAL ENCOUNTER (OUTPATIENT)
Dept: RADIATION ONCOLOGY | Age: 74
Discharge: HOME | End: 2024-07-02
Payer: MEDICARE

## 2024-07-02 VITALS
TEMPERATURE: 97.6 F | BODY MASS INDEX: 28.2 KG/M2 | SYSTOLIC BLOOD PRESSURE: 115 MMHG | WEIGHT: 144.4 LBS | DIASTOLIC BLOOD PRESSURE: 76 MMHG | RESPIRATION RATE: 16 BRPM | OXYGEN SATURATION: 98 % | HEART RATE: 84 BPM

## 2024-07-02 PROCEDURE — 77386 HC NTSTY MODUL RAD TX DLVR CPLX: CPT | Performed by: RADIOLOGY

## 2024-07-02 PROCEDURE — 77014 CHG CT GUIDANCE RADIATION THERAPY FLDS PLACEMENT: CPT | Performed by: RADIOLOGY

## 2024-07-02 NOTE — PROGRESS NOTES
Baseline  Proctitis: None  Diarrhea: None - Loose stools - Controlled with Imodium    Additional Comments: Using Aquaphor after bath at bedtime. Using betamethasone 3-4x/day to bilateral groins and rectal area up to coccyx. Teaching sheet for tub soaks given to patient to complete 2/3 times per day.    MEDICATIONS:     Current Outpatient Medications   Medication Sig Dispense Refill    sucralfate (CARAFATE) 1 GM/10ML suspension Take 10 mLs by mouth 4 times daily (before meals and nightly) Take 4 times a day - 30 min before meals and at bedtime - Swish and swallow 1200 mL 1    betamethasone valerate (VALISONE) 0.1 % cream Apply topically to radiation treatment area 2 times daily. 90 g 0    FIBER PO Take 1 capsule by mouth in the morning and at bedtime      calcium carbonate 600 MG TABS tablet Take 1 tablet by mouth in the morning and at bedtime      diphenoxylate-atropine (LOMOTIL) 2.5-0.025 MG per tablet Take 2 tablets by mouth 4 times daily as needed for Diarrhea.      amLODIPine (NORVASC) 5 MG tablet Take 1 tablet by mouth at bedtime      Multiple Vitamins-Minerals (THERAPEUTIC MULTIVITAMIN-MINERALS) tablet Take 1 tablet by mouth daily      Omega-3 Fatty Acids (OMEGA 3 500 PO) Take 500 mg by mouth daily       No current facility-administered medications for this encounter.       PHYSICAL EXAM:       ECO - Symptomatic but completely ambulatory (Restricted in physically strenuous activity but ambulatory and able to carry out work of a light or sedentary nature. For example, light housework, office work)     General: NAD, AO x 3, Mentation is clear with appropriate affect.  HEENT:  Normocephalic, atraumatic   Thorax:  Unlabored  Abdomen:  Non-distended  Skin - treatment portal: SEE above.  Mild erythema of the perineal area, focal areas of dry/moist desquamation, with some anal mucus/fecal drainage. No treatment skin effects noted of the inguinal region.    Chemotherapy Update: Concurrent chemotherapy    Treatment

## 2024-07-03 ENCOUNTER — HOSPITAL ENCOUNTER (OUTPATIENT)
Dept: RADIATION ONCOLOGY | Age: 74
Discharge: HOME | End: 2024-07-03
Payer: MEDICARE

## 2024-07-03 PROCEDURE — 77386 HC NTSTY MODUL RAD TX DLVR CPLX: CPT | Performed by: RADIOLOGY

## 2024-07-03 NOTE — DISCHARGE INSTRUCTIONS
PATIENT DISCHARGE INSTRUCTIONS    Remember that side effects present at the end of your treatments will improve within a few weeks after the last treatment.  Eat well balanced meals even though your treatments are finished.  This will help speed the healing process. Continue any special diets prescribed to control side effects until these side effects have been resolved.  Get plenty of rest.  If you have experienced fatigue and/or weakness, this may continue for several weeks after your last treatment.  Continue with your daily activities according to the way you feel.  Continue to be gentle with your skin.  Follow your present skin care instructions until your follow-up visit.  IF YOU DEVELOP ANY CHANGES IN YOUR SKIN IN THE AREA TREATED WITH RADIATION, PLEASE CALL THE RADIATION ONCOLOGY NURSE -[phone].  Protect your skin from any injury and avoid direct sun exposure in the treatment area.  The skin in the treated area may always be more sensitive than the rest of your skin.  Always use SPF 30 or higher sun block if you will be in the sun and cannot avoid exposure.  Please contact your referring physician for a follow-up appointment in addition to your Radiation Oncology appointment.  Presence of pain:   Medication Taper: No    See Instructions Dated: N/A  Follow up orders: Will be discussed at Follow-Up    Skin Check  Tuesday 7/23/24 @ 8:30AM

## 2024-07-05 ENCOUNTER — HOSPITAL ENCOUNTER (OUTPATIENT)
Dept: RADIATION ONCOLOGY | Age: 74
Discharge: HOME | End: 2024-07-05
Payer: MEDICARE

## 2024-07-05 ENCOUNTER — CLINICAL DOCUMENTATION (OUTPATIENT)
Dept: CASE MANAGEMENT | Age: 74
End: 2024-07-05

## 2024-07-05 ENCOUNTER — HOSPITAL ENCOUNTER (OUTPATIENT)
Dept: INFUSION THERAPY | Age: 74
Discharge: HOME | End: 2024-07-05
Payer: MEDICARE

## 2024-07-05 VITALS
RESPIRATION RATE: 16 BRPM | SYSTOLIC BLOOD PRESSURE: 126 MMHG | OXYGEN SATURATION: 98 % | HEART RATE: 99 BPM | TEMPERATURE: 98.4 F | HEIGHT: 60 IN | BODY MASS INDEX: 28.27 KG/M2 | DIASTOLIC BLOOD PRESSURE: 74 MMHG | WEIGHT: 144 LBS

## 2024-07-05 DIAGNOSIS — C21.1 SQUAMOUS CELL CARCINOMA OF ANAL CANAL (HCC): Primary | ICD-10-CM

## 2024-07-05 PROCEDURE — 6360000002 HC RX W HCPCS: Performed by: INTERNAL MEDICINE

## 2024-07-05 PROCEDURE — 77386 HC NTSTY MODUL RAD TX DLVR CPLX: CPT | Performed by: RADIOLOGY

## 2024-07-05 PROCEDURE — 2580000003 HC RX 258: Performed by: INTERNAL MEDICINE

## 2024-07-05 PROCEDURE — 96523 IRRIG DRUG DELIVERY DEVICE: CPT

## 2024-07-05 RX ORDER — ONDANSETRON 2 MG/ML
8 INJECTION INTRAMUSCULAR; INTRAVENOUS
Status: CANCELLED | OUTPATIENT
Start: 2024-07-05

## 2024-07-05 RX ORDER — SODIUM CHLORIDE 0.9 % (FLUSH) 0.9 %
5-40 SYRINGE (ML) INJECTION PRN
Status: DISCONTINUED | OUTPATIENT
Start: 2024-07-05 | End: 2024-07-06 | Stop reason: HOSPADM

## 2024-07-05 RX ORDER — ONDANSETRON 4 MG/1
4 TABLET, FILM COATED ORAL 3 TIMES DAILY PRN
Qty: 21 TABLET | Refills: 3 | Status: SHIPPED | OUTPATIENT
Start: 2024-07-05

## 2024-07-05 RX ORDER — DIPHENHYDRAMINE HYDROCHLORIDE 50 MG/ML
50 INJECTION INTRAMUSCULAR; INTRAVENOUS
Status: CANCELLED | OUTPATIENT
Start: 2024-07-05

## 2024-07-05 RX ORDER — PROCHLORPERAZINE MALEATE 10 MG
10 TABLET ORAL EVERY 6 HOURS PRN
Qty: 120 TABLET | Refills: 1 | Status: SHIPPED | OUTPATIENT
Start: 2024-07-05

## 2024-07-05 RX ORDER — ACETAMINOPHEN 325 MG/1
650 TABLET ORAL
Status: CANCELLED | OUTPATIENT
Start: 2024-07-05

## 2024-07-05 RX ORDER — SODIUM CHLORIDE 0.9 % (FLUSH) 0.9 %
5-40 SYRINGE (ML) INJECTION PRN
Status: CANCELLED | OUTPATIENT
Start: 2024-07-05

## 2024-07-05 RX ORDER — SODIUM CHLORIDE 9 MG/ML
25 INJECTION, SOLUTION INTRAVENOUS PRN
Status: CANCELLED | OUTPATIENT
Start: 2024-07-05

## 2024-07-05 RX ORDER — HEPARIN 100 UNIT/ML
500 SYRINGE INTRAVENOUS PRN
Status: CANCELLED | OUTPATIENT
Start: 2024-07-05

## 2024-07-05 RX ORDER — HEPARIN 100 UNIT/ML
500 SYRINGE INTRAVENOUS PRN
Status: DISCONTINUED | OUTPATIENT
Start: 2024-07-05 | End: 2024-07-06 | Stop reason: HOSPADM

## 2024-07-05 RX ORDER — SODIUM CHLORIDE 9 MG/ML
INJECTION, SOLUTION INTRAVENOUS CONTINUOUS
Status: CANCELLED | OUTPATIENT
Start: 2024-07-05

## 2024-07-05 RX ORDER — EPINEPHRINE 1 MG/ML
0.3 INJECTION, SOLUTION INTRAMUSCULAR; SUBCUTANEOUS PRN
Status: CANCELLED | OUTPATIENT
Start: 2024-07-05

## 2024-07-05 RX ORDER — ALBUTEROL SULFATE 90 UG/1
4 AEROSOL, METERED RESPIRATORY (INHALATION) PRN
Status: CANCELLED | OUTPATIENT
Start: 2024-07-05

## 2024-07-05 RX ADMIN — HEPARIN 500 UNITS: 100 SYRINGE at 12:37

## 2024-07-05 RX ADMIN — SODIUM CHLORIDE, PRESERVATIVE FREE 20 ML: 5 INJECTION INTRAVENOUS at 12:37

## 2024-07-05 NOTE — PROGRESS NOTES
Patient alerted Fortunato that she has been experiencing nausea & vomiting, diarrhea.  Using Imodium for diarrhea, but stated she did not have any home RXS for the N/V.    Message to MARILEE MORRIS for RX for antiemetics.  Pt requested RXs be routed to [facility] [address].

## 2024-07-05 NOTE — PLAN OF CARE
Problem: Discharge Planning  Goal: Discharge to home or other facility with appropriate resources  Outcome: Progressing  Flowsheets (Taken 7/5/2024 1238)  Discharge to home or other facility with appropriate resources: Identify barriers to discharge with patient and caregiver  Note: Verbalize understanding of discharge instructions, follow up appointments, and when to call Physician.     Problem: Safety - Adult  Goal: Free from fall injury  Outcome: Progressing  Flowsheets (Taken 7/5/2024 1238)  Free From Fall Injury: Instruct family/caregiver on patient safety  Note: No falls occurred with visit today.     Problem: Chronic Conditions and Co-morbidities  Goal: Patient's chronic conditions and co-morbidity symptoms are monitored and maintained or improved  Flowsheets (Taken 7/5/2024 1238)  Care Plan - Patient's Chronic Conditions and Co-Morbidity Symptoms are Monitored and Maintained or Improved: Monitor and assess patient's chronic conditions and comorbid symptoms for stability, deterioration, or improvement   Care plan reviewed with patient.  Patient verbalizes understanding of the plan of care and contributes to goal setting.

## 2024-07-08 ENCOUNTER — HOSPITAL ENCOUNTER (OUTPATIENT)
Dept: RADIATION ONCOLOGY | Age: 74
Discharge: HOME | End: 2024-07-08
Payer: MEDICARE

## 2024-07-08 PROCEDURE — 77386 HC NTSTY MODUL RAD TX DLVR CPLX: CPT | Performed by: RADIOLOGY

## 2024-07-08 PROCEDURE — 77014 CHG CT GUIDANCE RADIATION THERAPY FLDS PLACEMENT: CPT | Performed by: RADIOLOGY

## 2024-07-08 ASSESSMENT — ENCOUNTER SYMPTOMS
COUGH: 0
VOMITING: 0
DIARRHEA: 0
NAUSEA: 1
CONSTIPATION: 0
SHORTNESS OF BREATH: 0

## 2024-07-09 ENCOUNTER — TELEPHONE (OUTPATIENT)
Dept: CASE MANAGEMENT | Age: 74
End: 2024-07-09

## 2024-07-09 ENCOUNTER — HOSPITAL ENCOUNTER (OUTPATIENT)
Dept: RADIATION ONCOLOGY | Age: 74
Discharge: HOME | End: 2024-07-09
Payer: MEDICARE

## 2024-07-09 ENCOUNTER — HOSPITAL ENCOUNTER (OUTPATIENT)
Dept: INFUSION THERAPY | Age: 74
Discharge: HOME | End: 2024-07-09
Payer: MEDICARE

## 2024-07-09 VITALS
SYSTOLIC BLOOD PRESSURE: 120 MMHG | WEIGHT: 141.54 LBS | RESPIRATION RATE: 18 BRPM | BODY MASS INDEX: 27.64 KG/M2 | HEART RATE: 102 BPM | OXYGEN SATURATION: 98 % | TEMPERATURE: 97.8 F | DIASTOLIC BLOOD PRESSURE: 70 MMHG

## 2024-07-09 VITALS
DIASTOLIC BLOOD PRESSURE: 70 MMHG | HEART RATE: 98 BPM | SYSTOLIC BLOOD PRESSURE: 141 MMHG | HEIGHT: 60 IN | OXYGEN SATURATION: 98 % | TEMPERATURE: 98.2 F | BODY MASS INDEX: 28.17 KG/M2 | WEIGHT: 143.5 LBS | RESPIRATION RATE: 16 BRPM

## 2024-07-09 DIAGNOSIS — C21.1 SQUAMOUS CELL CARCINOMA OF ANAL CANAL (HCC): ICD-10-CM

## 2024-07-09 DIAGNOSIS — C21.1 SQUAMOUS CELL CARCINOMA OF ANAL CANAL (HCC): Primary | ICD-10-CM

## 2024-07-09 DIAGNOSIS — Z51.11 ENCOUNTER FOR CHEMOTHERAPY MANAGEMENT: ICD-10-CM

## 2024-07-09 PROCEDURE — 96361 HYDRATE IV INFUSION ADD-ON: CPT

## 2024-07-09 PROCEDURE — 96360 HYDRATION IV INFUSION INIT: CPT

## 2024-07-09 PROCEDURE — 2580000003 HC RX 258: Performed by: RADIOLOGY

## 2024-07-09 PROCEDURE — 2580000003 HC RX 258: Performed by: INTERNAL MEDICINE

## 2024-07-09 PROCEDURE — 77014 CHG CT GUIDANCE RADIATION THERAPY FLDS PLACEMENT: CPT | Performed by: RADIOLOGY

## 2024-07-09 PROCEDURE — 6360000002 HC RX W HCPCS: Performed by: INTERNAL MEDICINE

## 2024-07-09 PROCEDURE — 77386 HC NTSTY MODUL RAD TX DLVR CPLX: CPT | Performed by: RADIOLOGY

## 2024-07-09 RX ORDER — ALBUTEROL SULFATE 90 UG/1
4 AEROSOL, METERED RESPIRATORY (INHALATION) PRN
Status: CANCELLED | OUTPATIENT
Start: 2024-07-09

## 2024-07-09 RX ORDER — 0.9 % SODIUM CHLORIDE 0.9 %
1000 INTRAVENOUS SOLUTION INTRAVENOUS ONCE
Status: CANCELLED | OUTPATIENT
Start: 2024-07-09 | End: 2024-07-09

## 2024-07-09 RX ORDER — ONDANSETRON 2 MG/ML
8 INJECTION INTRAMUSCULAR; INTRAVENOUS
Status: CANCELLED | OUTPATIENT
Start: 2024-07-09

## 2024-07-09 RX ORDER — SODIUM CHLORIDE 9 MG/ML
INJECTION, SOLUTION INTRAVENOUS CONTINUOUS
Status: CANCELLED | OUTPATIENT
Start: 2024-07-09

## 2024-07-09 RX ORDER — HEPARIN 100 UNIT/ML
500 SYRINGE INTRAVENOUS PRN
Status: CANCELLED | OUTPATIENT
Start: 2024-07-09

## 2024-07-09 RX ORDER — DIPHENHYDRAMINE HYDROCHLORIDE 50 MG/ML
50 INJECTION INTRAMUSCULAR; INTRAVENOUS
Status: CANCELLED | OUTPATIENT
Start: 2024-07-09

## 2024-07-09 RX ORDER — ACETAMINOPHEN 325 MG/1
650 TABLET ORAL
Status: CANCELLED | OUTPATIENT
Start: 2024-07-09

## 2024-07-09 RX ORDER — SODIUM CHLORIDE 0.9 % (FLUSH) 0.9 %
5-40 SYRINGE (ML) INJECTION PRN
Status: DISCONTINUED | OUTPATIENT
Start: 2024-07-09 | End: 2024-07-10 | Stop reason: HOSPADM

## 2024-07-09 RX ORDER — 0.9 % SODIUM CHLORIDE 0.9 %
1000 INTRAVENOUS SOLUTION INTRAVENOUS ONCE
Status: COMPLETED | OUTPATIENT
Start: 2024-07-09 | End: 2024-07-09

## 2024-07-09 RX ORDER — HEPARIN 100 UNIT/ML
500 SYRINGE INTRAVENOUS PRN
Status: DISCONTINUED | OUTPATIENT
Start: 2024-07-09 | End: 2024-07-10 | Stop reason: HOSPADM

## 2024-07-09 RX ORDER — SODIUM CHLORIDE 9 MG/ML
25 INJECTION, SOLUTION INTRAVENOUS PRN
Status: CANCELLED | OUTPATIENT
Start: 2024-07-09

## 2024-07-09 RX ORDER — SODIUM CHLORIDE 0.9 % (FLUSH) 0.9 %
5-40 SYRINGE (ML) INJECTION PRN
Status: CANCELLED | OUTPATIENT
Start: 2024-07-09

## 2024-07-09 RX ORDER — EPINEPHRINE 1 MG/ML
0.3 INJECTION, SOLUTION INTRAMUSCULAR; SUBCUTANEOUS PRN
Status: CANCELLED | OUTPATIENT
Start: 2024-07-09

## 2024-07-09 RX ADMIN — Medication 500 UNITS: at 13:44

## 2024-07-09 RX ADMIN — SODIUM CHLORIDE 1000 ML: 9 INJECTION, SOLUTION INTRAVENOUS at 11:38

## 2024-07-09 RX ADMIN — SODIUM CHLORIDE, PRESERVATIVE FREE 10 ML: 5 INJECTION INTRAVENOUS at 11:37

## 2024-07-09 NOTE — TELEPHONE ENCOUNTER
Fortunato received vm message from pt reporting \"nausea medications don't work; not able to eat bc everything comes back up\".      Fortunato return call to pt.    Fortunato  reviewed how to use Compazine alternating with Zofran to control N/V.  Pt has not been using the meds as reviewed.  Pt reports she \"stopped eating so I don't get sick\".    Fortunato advised pt take antiemetic 1 hour prior to eating to trial efficacy.  Pt also not using Imodium as was advised.  She continues with some diarrhea.  Fortunato reviewed use of Imodium with pt also.    Fortunato advised pt take antiemetic, wait 1 hour & try to eat oatmeal.  Fortunato will FU with pt tomorrow when she comes for XRT.  Pt reports she received IVF today which she believes helped her feel a bit better.      Fortunato re-eval tomorrow.

## 2024-07-09 NOTE — PLAN OF CARE
Problem: Discharge Planning  Goal: Discharge to home or other facility with appropriate resources  Outcome: Progressing  Flowsheets (Taken 7/9/2024 1138)  Discharge to home or other facility with appropriate resources: Identify barriers to discharge with patient and caregiver  Note: Verbalize understanding of discharge instructions, follow up appointments, and when to call Physician.     Problem: Safety - Adult  Goal: Free from fall injury  Outcome: Progressing  Flowsheets (Taken 7/9/2024 1138)  Free From Fall Injury: Instruct family/caregiver on patient safety  Note: No falls occurred with visit today.     Problem: Chronic Conditions and Co-morbidities  Goal: Patient's chronic conditions and co-morbidity symptoms are monitored and maintained or improved  Outcome: Progressing  Flowsheets (Taken 7/9/2024 1138)  Care Plan - Patient's Chronic Conditions and Co-Morbidity Symptoms are Monitored and Maintained or Improved: Monitor and assess patient's chronic conditions and comorbid symptoms for stability, deterioration, or improvement  Note: Discussed indications for fluids   Care plan reviewed with patient.  Patient verbalizes understanding of the plan of care and contributes to goal setting.

## 2024-07-09 NOTE — PROGRESS NOTES
[facility]          [address], [address]        [place] [id number]        O: [phone]        F: [phone]       [place]            Dr. Robbin Chris MD MS          Dr. 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 Dose (cGy): 5400    Patient was seen today for weekly visit.     Wt Readings from Last 3 Encounters:   24 64.2 kg (141 lb 8.6 oz)   24 65.3 kg (144 lb)   24 65.5 kg (144 lb 6.4 oz)       /70   Pulse (!) 102   Temp 97.8 °F (36.6 °C) (Infrared)   Resp 18   Wt 64.2 kg (141 lb 8.6 oz)   SpO2 98%   BMI 27.64 kg/m²     Lab Results   Component Value Date    WBC 2.5 (L) 2024    HGB 12.3 2024    HCT 36.3 (L) 2024     2024       Comfort Alteration  Fatigue:Must curtail daily activities even with rest periods and early bedtime    Pain Location: Denies  Pain Intensity (Current): 0 No Pain  Pain Treatment: N/A  Pain Relief: n/a    Emotional Alteration:   Coping: effective    Nutritional Alteration  Anorexia: Loss of appetite but able to eat a small amount of food and/or liquids  Nausea: Continuous feeling of nausea, medication not helping  Vomitin-4 episodes in 24 hours, whenever she tries to eat    Skin Alteration   Skin reaction: Bright erythema    Elimination Alterations  Urinary Frequency: None  Urinary Retention: Normal  Urinary Incontinence:

## 2024-07-09 NOTE — PROGRESS NOTES
Patient tolerated one liter of normal saline without any complications. Patient verbalized understanding of discharge instructions. Ambulated off unit per self with belongings.

## 2024-07-09 NOTE — DISCHARGE INSTRUCTIONS
Patient tolerated one liter of normal saline over two hours without any complications. Patient verbalized understanding of discharge instructions. Ambulated off unit per self with belongings.

## 2024-07-09 NOTE — PROGRESS NOTES
Patient received one liter of normal saline over two hours. While in the infusion area patient drank one bottle of water. Upon completion patient stated she felt better after receiving fluids. Denied nausea or pain.

## 2024-07-10 ENCOUNTER — CLINICAL DOCUMENTATION (OUTPATIENT)
Dept: NUTRITION | Age: 74
End: 2024-07-10

## 2024-07-10 ENCOUNTER — HOSPITAL ENCOUNTER (OUTPATIENT)
Dept: RADIATION ONCOLOGY | Age: 74
Discharge: HOME | End: 2024-07-10
Payer: MEDICARE

## 2024-07-10 PROCEDURE — 77386 HC NTSTY MODUL RAD TX DLVR CPLX: CPT | Performed by: RADIOLOGY

## 2024-07-10 RX ORDER — 0.9 % SODIUM CHLORIDE 0.9 %
1000 INTRAVENOUS SOLUTION INTRAVENOUS ONCE
OUTPATIENT
Start: 2024-07-12 | End: 2024-07-12

## 2024-07-10 NOTE — PROGRESS NOTES
Nutrition Assessment    Reason for Visit:   7/10/1024 - diarrhea current with radiation therapy for anal cancer    Nutrition Recommendations:   Low-fiber diet but include soluble fiber  Adequate fluids - provided samples of pedialyte  Small and frequent meals and snack  Consider ensure clear - coupons provided    Malnutrition Assessment: (7/10/2024)  Malnutrition Status: severe malnutrition  Context: acute illness  Findings of the 6 clinical characteristics of malnutrition (minimum of 2 out of 6 clinical characteristics is required to make the dx of moderate or severe Protein Calorie Malnutrition based on AND/ASPEN Guidelines):   1. Energy Intake: decreased oral intake   2. Weight Loss: 6# loss (4% of body weight) in 1 month   3. Fat Loss: moderate loss   4. Muscle Loss: moderate loss   5. Fluid Accumulation: none noted   6.  Strength: not measured but patient reports decrease strength    Nutrition Diagnosis:   Problem: severe malnutrition in the context of acute illness  Etiology: altered GI function  Signs and Symptoms: nausea/vomiting, diarrhea, unintentional weight loss, moderate fat and muscle loss, decreased strength    Nutrition Assessment:   Subjective:   Patient seen and reports that she is struggling with nausea, vomiting, and diarrhea. Patient says that foods go right through her. Patient says that she has tried jello and chicken noodle soup but was not able to tolerate these. Patient also mentions that she was drinking shakes but is not able to tolerate them anymore either. Patient did get IVFs yesterday. Patient mentions that she is sipping on gatorade. Patient says that she did talk with Keeley,nurse navigator yesterday and Keeley encouraged compliance with medications: Imodium (does help per patient) and compazine (doesn't help, makes her really tired). Patient says that she is taking them but Keeley gave her ideas on better ways to take them. Patient mentions that she is more fatigued and has

## 2024-07-11 ENCOUNTER — HOSPITAL ENCOUNTER (OUTPATIENT)
Dept: RADIATION ONCOLOGY | Age: 74
Discharge: HOME | End: 2024-07-11
Payer: MEDICARE

## 2024-07-11 PROCEDURE — 77386 HC NTSTY MODUL RAD TX DLVR CPLX: CPT | Performed by: RADIOLOGY

## 2024-07-11 PROCEDURE — 77427 RADIATION TX MANAGEMENT X5: CPT | Performed by: RADIOLOGY

## 2024-07-12 ENCOUNTER — HOSPITAL ENCOUNTER (OUTPATIENT)
Dept: RADIATION ONCOLOGY | Age: 74
Discharge: HOME | End: 2024-07-12
Payer: MEDICARE

## 2024-07-12 ENCOUNTER — CLINICAL DOCUMENTATION (OUTPATIENT)
Dept: CASE MANAGEMENT | Age: 74
End: 2024-07-12

## 2024-07-12 ENCOUNTER — APPOINTMENT (OUTPATIENT)
Dept: GENERAL RADIOLOGY | Age: 74
End: 2024-07-12
Payer: MEDICARE

## 2024-07-12 ENCOUNTER — HOSPITAL ENCOUNTER (OUTPATIENT)
Dept: INFUSION THERAPY | Age: 74
Discharge: HOME | End: 2024-07-12
Payer: MEDICARE

## 2024-07-12 ENCOUNTER — APPOINTMENT (OUTPATIENT)
Dept: CT IMAGING | Age: 74
End: 2024-07-12
Payer: MEDICARE

## 2024-07-12 ENCOUNTER — HOSPITAL ENCOUNTER (INPATIENT)
Age: 74
LOS: 13 days | Discharge: NURSING, REHAB OR LONG-TERM CARE | End: 2024-07-25
Attending: EMERGENCY MEDICINE
Payer: MEDICARE

## 2024-07-12 VITALS
DIASTOLIC BLOOD PRESSURE: 76 MMHG | SYSTOLIC BLOOD PRESSURE: 117 MMHG | BODY MASS INDEX: 28.27 KG/M2 | HEIGHT: 60 IN | RESPIRATION RATE: 16 BRPM | OXYGEN SATURATION: 98 % | WEIGHT: 144 LBS | HEART RATE: 118 BPM | TEMPERATURE: 98 F

## 2024-07-12 DIAGNOSIS — C21.1 SQUAMOUS CELL CARCINOMA OF ANAL CANAL (HCC): Primary | ICD-10-CM

## 2024-07-12 DIAGNOSIS — E87.20 LACTIC ACIDOSIS: ICD-10-CM

## 2024-07-12 DIAGNOSIS — D72.819 LEUKOPENIA, UNSPECIFIED TYPE: Primary | ICD-10-CM

## 2024-07-12 DIAGNOSIS — R53.1 GENERAL WEAKNESS: ICD-10-CM

## 2024-07-12 DIAGNOSIS — E86.0 DEHYDRATION: ICD-10-CM

## 2024-07-12 DIAGNOSIS — D69.6 THROMBOCYTOPENIA (HCC): ICD-10-CM

## 2024-07-12 DIAGNOSIS — K52.9 ENTERITIS: ICD-10-CM

## 2024-07-12 LAB
ABO: NORMAL
ALBUMIN SERPL BCG-MCNC: 3.1 G/DL (ref 3.5–5.1)
ALP SERPL-CCNC: 51 U/L (ref 38–126)
ALT SERPL W/O P-5'-P-CCNC: 16 U/L (ref 11–66)
ANION GAP SERPL CALC-SCNC: 12 MEQ/L (ref 8–16)
ANTIBODY SCREEN: NORMAL
AST SERPL-CCNC: 36 U/L (ref 5–40)
BACTERIA URNS QL MICRO: ABNORMAL /HPF
BASE EXCESS BLDA CALC-SCNC: -5 MMOL/L (ref -2–3)
BILIRUB SERPL-MCNC: 1.8 MG/DL (ref 0.3–1.2)
BILIRUB UR QL STRIP.AUTO: ABNORMAL
BUN SERPL-MCNC: 17 MG/DL (ref 7–22)
CALCIUM SERPL-MCNC: 8.1 MG/DL (ref 8.5–10.5)
CASTS #/AREA URNS LPF: ABNORMAL /LPF
CASTS 2: ABNORMAL /LPF
CHARACTER UR: ABNORMAL
CHLORIDE SERPL-SCNC: 102 MEQ/L (ref 98–111)
CO2 SERPL-SCNC: 19 MEQ/L (ref 23–33)
COLLECTED BY:: ABNORMAL
COLOR, UA: ABNORMAL
CREAT SERPL-MCNC: 0.5 MG/DL (ref 0.4–1.2)
CRYSTALS URNS MICRO: ABNORMAL
DEVICE: ABNORMAL
EKG ATRIAL RATE: 115 BPM
EKG ATRIAL RATE: 115 BPM
EKG P AXIS: 12 DEGREES
EKG P AXIS: 13 DEGREES
EKG P-R INTERVAL: 86 MS
EKG P-R INTERVAL: 94 MS
EKG Q-T INTERVAL: 314 MS
EKG Q-T INTERVAL: 328 MS
EKG QRS DURATION: 78 MS
EKG QRS DURATION: 82 MS
EKG QTC CALCULATION (BAZETT): 434 MS
EKG QTC CALCULATION (BAZETT): 453 MS
EKG R AXIS: 45 DEGREES
EKG R AXIS: 47 DEGREES
EKG T AXIS: -43 DEGREES
EKG T AXIS: -52 DEGREES
EKG VENTRICULAR RATE: 115 BPM
EKG VENTRICULAR RATE: 115 BPM
EPITHELIAL CELLS, UA: ABNORMAL /HPF
GFR SERPL CREATININE-BSD FRML MDRD: > 90 ML/MIN/1.73M2
GLUCOSE SERPL-MCNC: 130 MG/DL (ref 70–108)
GLUCOSE UR QL STRIP.AUTO: NEGATIVE MG/DL
HCO3 BLDA-SCNC: 19 MMOL/L (ref 23–28)
HGB UR QL STRIP.AUTO: ABNORMAL
KETONES UR QL STRIP.AUTO: 40
LACTATE SERPL-SCNC: 1.3 MMOL/L (ref 0.5–2)
LACTATE SERPL-SCNC: 2.1 MMOL/L (ref 0.5–2)
LIPASE SERPL-CCNC: 6.5 U/L (ref 5.6–51.3)
MISCELLANEOUS 2: ABNORMAL
MUCOUS THREADS URNS QL MICRO: ABNORMAL
NITRITE UR QL STRIP: NEGATIVE
OSMOLALITY SERPL CALC.SUM OF ELEC: 269.7 MOSMOL/KG (ref 275–300)
PCO2 TEMP ADJ BLDMV: 30 MMHG (ref 41–51)
PH BLDMV: 7.4 [PH] (ref 7.31–7.41)
PH UR STRIP.AUTO: 5.5 [PH] (ref 5–9)
PO2 BLDMV: 31 MMHG (ref 25–40)
POTASSIUM SERPL-SCNC: 3.6 MEQ/L (ref 3.5–5.2)
PROT SERPL-MCNC: 5.5 G/DL (ref 6.1–8)
PROT UR STRIP.AUTO-MCNC: 100 MG/DL
RBC URINE: ABNORMAL /HPF
RENAL EPI CELLS #/AREA URNS HPF: ABNORMAL /[HPF]
RH FACTOR: NORMAL
SAO2 % BLDMV: 61 %
SCAN OF BLOOD SMEAR: NORMAL
SITE: ABNORMAL
SODIUM SERPL-SCNC: 133 MEQ/L (ref 135–145)
SP GR UR REFRACT.AUTO: 1.03 (ref 1–1.03)
UROBILINOGEN, URINE: 1 EU/DL (ref 0–1)
VENTILATION MODE VENT: ABNORMAL
WBC #/AREA URNS HPF: ABNORMAL /HPF
WBC #/AREA URNS HPF: NEGATIVE /[HPF]
YEAST LIKE FUNGI URNS QL MICRO: ABNORMAL

## 2024-07-12 PROCEDURE — 36591 DRAW BLOOD OFF VENOUS DEVICE: CPT

## 2024-07-12 PROCEDURE — 2580000003 HC RX 258: Performed by: EMERGENCY MEDICINE

## 2024-07-12 PROCEDURE — 87147 CULTURE TYPE IMMUNOLOGIC: CPT

## 2024-07-12 PROCEDURE — 85025 COMPLETE CBC W/AUTO DIFF WBC: CPT

## 2024-07-12 PROCEDURE — 99223 1ST HOSP IP/OBS HIGH 75: CPT | Performed by: PHYSICIAN ASSISTANT

## 2024-07-12 PROCEDURE — 96365 THER/PROPH/DIAG IV INF INIT: CPT

## 2024-07-12 PROCEDURE — 96375 TX/PRO/DX INJ NEW DRUG ADDON: CPT

## 2024-07-12 PROCEDURE — 2580000003 HC RX 258: Performed by: INTERNAL MEDICINE

## 2024-07-12 PROCEDURE — 87801 DETECT AGNT MULT DNA AMPLI: CPT

## 2024-07-12 PROCEDURE — 6360000002 HC RX W HCPCS: Performed by: PHYSICIAN ASSISTANT

## 2024-07-12 PROCEDURE — 81001 URINALYSIS AUTO W/SCOPE: CPT

## 2024-07-12 PROCEDURE — 93010 ELECTROCARDIOGRAM REPORT: CPT | Performed by: INTERNAL MEDICINE

## 2024-07-12 PROCEDURE — 77386 HC NTSTY MODUL RAD TX DLVR CPLX: CPT | Performed by: RADIOLOGY

## 2024-07-12 PROCEDURE — 6360000002 HC RX W HCPCS: Performed by: NURSE PRACTITIONER

## 2024-07-12 PROCEDURE — 74177 CT ABD & PELVIS W/CONTRAST: CPT

## 2024-07-12 PROCEDURE — 86850 RBC ANTIBODY SCREEN: CPT

## 2024-07-12 PROCEDURE — 6370000000 HC RX 637 (ALT 250 FOR IP): Performed by: PHYSICIAN ASSISTANT

## 2024-07-12 PROCEDURE — 99211 OFF/OP EST MAY X REQ PHY/QHP: CPT

## 2024-07-12 PROCEDURE — 2580000003 HC RX 258: Performed by: PHYSICIAN ASSISTANT

## 2024-07-12 PROCEDURE — 99285 EMERGENCY DEPT VISIT HI MDM: CPT

## 2024-07-12 PROCEDURE — 82803 BLOOD GASES ANY COMBINATION: CPT

## 2024-07-12 PROCEDURE — 96366 THER/PROPH/DIAG IV INF ADDON: CPT

## 2024-07-12 PROCEDURE — 6360000004 HC RX CONTRAST MEDICATION: Performed by: EMERGENCY MEDICINE

## 2024-07-12 PROCEDURE — 36415 COLL VENOUS BLD VENIPUNCTURE: CPT

## 2024-07-12 PROCEDURE — 87086 URINE CULTURE/COLONY COUNT: CPT

## 2024-07-12 PROCEDURE — 2580000003 HC RX 258: Performed by: NURSE PRACTITIONER

## 2024-07-12 PROCEDURE — 96361 HYDRATE IV INFUSION ADD-ON: CPT

## 2024-07-12 PROCEDURE — 86901 BLOOD TYPING SEROLOGIC RH(D): CPT

## 2024-07-12 PROCEDURE — 93005 ELECTROCARDIOGRAM TRACING: CPT | Performed by: EMERGENCY MEDICINE

## 2024-07-12 PROCEDURE — 71045 X-RAY EXAM CHEST 1 VIEW: CPT

## 2024-07-12 PROCEDURE — 6360000002 HC RX W HCPCS: Performed by: EMERGENCY MEDICINE

## 2024-07-12 PROCEDURE — 87040 BLOOD CULTURE FOR BACTERIA: CPT

## 2024-07-12 PROCEDURE — 1200000003 HC TELEMETRY R&B

## 2024-07-12 PROCEDURE — 83605 ASSAY OF LACTIC ACID: CPT

## 2024-07-12 PROCEDURE — 80053 COMPREHEN METABOLIC PANEL: CPT

## 2024-07-12 PROCEDURE — 86900 BLOOD TYPING SEROLOGIC ABO: CPT

## 2024-07-12 PROCEDURE — 83690 ASSAY OF LIPASE: CPT

## 2024-07-12 RX ORDER — ACETAMINOPHEN 650 MG/1
650 SUPPOSITORY RECTAL EVERY 6 HOURS PRN
Status: DISCONTINUED | OUTPATIENT
Start: 2024-07-12 | End: 2024-07-25 | Stop reason: HOSPADM

## 2024-07-12 RX ORDER — SODIUM CHLORIDE 0.9 % (FLUSH) 0.9 %
5-40 SYRINGE (ML) INJECTION PRN
Status: DISCONTINUED | OUTPATIENT
Start: 2024-07-12 | End: 2024-07-25 | Stop reason: HOSPADM

## 2024-07-12 RX ORDER — ONDANSETRON 2 MG/ML
8 INJECTION INTRAMUSCULAR; INTRAVENOUS
OUTPATIENT
Start: 2024-07-12

## 2024-07-12 RX ORDER — DIPHENHYDRAMINE HYDROCHLORIDE 50 MG/ML
50 INJECTION INTRAMUSCULAR; INTRAVENOUS
OUTPATIENT
Start: 2024-07-12

## 2024-07-12 RX ORDER — POTASSIUM CHLORIDE 7.45 MG/ML
10 INJECTION INTRAVENOUS PRN
Status: DISCONTINUED | OUTPATIENT
Start: 2024-07-12 | End: 2024-07-25 | Stop reason: HOSPADM

## 2024-07-12 RX ORDER — AMLODIPINE BESYLATE 5 MG/1
5 TABLET ORAL NIGHTLY
Status: DISCONTINUED | OUTPATIENT
Start: 2024-07-12 | End: 2024-07-25 | Stop reason: HOSPADM

## 2024-07-12 RX ORDER — ONDANSETRON 2 MG/ML
4 INJECTION INTRAMUSCULAR; INTRAVENOUS ONCE
Status: COMPLETED | OUTPATIENT
Start: 2024-07-12 | End: 2024-07-12

## 2024-07-12 RX ORDER — HEPARIN 100 UNIT/ML
500 SYRINGE INTRAVENOUS PRN
OUTPATIENT
Start: 2024-07-12

## 2024-07-12 RX ORDER — ACETAMINOPHEN 325 MG/1
650 TABLET ORAL EVERY 6 HOURS PRN
Status: DISCONTINUED | OUTPATIENT
Start: 2024-07-12 | End: 2024-07-25 | Stop reason: HOSPADM

## 2024-07-12 RX ORDER — SODIUM CHLORIDE 9 MG/ML
INJECTION, SOLUTION INTRAVENOUS PRN
Status: DISCONTINUED | OUTPATIENT
Start: 2024-07-12 | End: 2024-07-25 | Stop reason: HOSPADM

## 2024-07-12 RX ORDER — SODIUM CHLORIDE 0.9 % (FLUSH) 0.9 %
5-40 SYRINGE (ML) INJECTION EVERY 12 HOURS SCHEDULED
Status: DISCONTINUED | OUTPATIENT
Start: 2024-07-12 | End: 2024-07-25 | Stop reason: HOSPADM

## 2024-07-12 RX ORDER — HEPARIN 100 UNIT/ML
500 SYRINGE INTRAVENOUS PRN
Status: DISCONTINUED | OUTPATIENT
Start: 2024-07-12 | End: 2024-07-13 | Stop reason: HOSPADM

## 2024-07-12 RX ORDER — CALCIUM CARBONATE 500(1250)
1 TABLET ORAL 2 TIMES DAILY
Status: DISCONTINUED | OUTPATIENT
Start: 2024-07-12 | End: 2024-07-25 | Stop reason: HOSPADM

## 2024-07-12 RX ORDER — ALBUTEROL SULFATE 90 UG/1
4 AEROSOL, METERED RESPIRATORY (INHALATION) PRN
OUTPATIENT
Start: 2024-07-12

## 2024-07-12 RX ORDER — ACETAMINOPHEN 325 MG/1
650 TABLET ORAL
OUTPATIENT
Start: 2024-07-12

## 2024-07-12 RX ORDER — 0.9 % SODIUM CHLORIDE 0.9 %
30 INTRAVENOUS SOLUTION INTRAVENOUS ONCE
Status: COMPLETED | OUTPATIENT
Start: 2024-07-12 | End: 2024-07-12

## 2024-07-12 RX ORDER — MAGNESIUM SULFATE IN WATER 40 MG/ML
2000 INJECTION, SOLUTION INTRAVENOUS PRN
Status: DISCONTINUED | OUTPATIENT
Start: 2024-07-12 | End: 2024-07-25 | Stop reason: HOSPADM

## 2024-07-12 RX ORDER — SODIUM CHLORIDE 9 MG/ML
INJECTION, SOLUTION INTRAVENOUS CONTINUOUS
OUTPATIENT
Start: 2024-07-12

## 2024-07-12 RX ORDER — EPINEPHRINE 1 MG/ML
0.3 INJECTION, SOLUTION INTRAMUSCULAR; SUBCUTANEOUS PRN
OUTPATIENT
Start: 2024-07-12

## 2024-07-12 RX ORDER — POTASSIUM CHLORIDE 20 MEQ/1
40 TABLET, EXTENDED RELEASE ORAL PRN
Status: DISCONTINUED | OUTPATIENT
Start: 2024-07-12 | End: 2024-07-25 | Stop reason: HOSPADM

## 2024-07-12 RX ORDER — ENOXAPARIN SODIUM 100 MG/ML
40 INJECTION SUBCUTANEOUS DAILY
Status: DISCONTINUED | OUTPATIENT
Start: 2024-07-13 | End: 2024-07-25 | Stop reason: HOSPADM

## 2024-07-12 RX ORDER — ONDANSETRON 2 MG/ML
4 INJECTION INTRAMUSCULAR; INTRAVENOUS EVERY 6 HOURS PRN
Status: DISCONTINUED | OUTPATIENT
Start: 2024-07-12 | End: 2024-07-25 | Stop reason: HOSPADM

## 2024-07-12 RX ORDER — ONDANSETRON 4 MG/1
4 TABLET, ORALLY DISINTEGRATING ORAL EVERY 8 HOURS PRN
Status: DISCONTINUED | OUTPATIENT
Start: 2024-07-12 | End: 2024-07-25 | Stop reason: HOSPADM

## 2024-07-12 RX ORDER — SODIUM CHLORIDE 9 MG/ML
INJECTION, SOLUTION INTRAVENOUS CONTINUOUS
Status: DISCONTINUED | OUTPATIENT
Start: 2024-07-12 | End: 2024-07-13

## 2024-07-12 RX ORDER — SODIUM CHLORIDE 0.9 % (FLUSH) 0.9 %
5-40 SYRINGE (ML) INJECTION PRN
Status: DISCONTINUED | OUTPATIENT
Start: 2024-07-12 | End: 2024-07-13 | Stop reason: HOSPADM

## 2024-07-12 RX ORDER — SODIUM CHLORIDE 0.9 % (FLUSH) 0.9 %
5-40 SYRINGE (ML) INJECTION PRN
OUTPATIENT
Start: 2024-07-12

## 2024-07-12 RX ORDER — POLYETHYLENE GLYCOL 3350 17 G/17G
17 POWDER, FOR SOLUTION ORAL DAILY PRN
Status: DISCONTINUED | OUTPATIENT
Start: 2024-07-12 | End: 2024-07-25 | Stop reason: HOSPADM

## 2024-07-12 RX ORDER — PROCHLORPERAZINE MALEATE 10 MG
10 TABLET ORAL EVERY 6 HOURS PRN
Status: DISCONTINUED | OUTPATIENT
Start: 2024-07-12 | End: 2024-07-25 | Stop reason: HOSPADM

## 2024-07-12 RX ORDER — SODIUM CHLORIDE 9 MG/ML
25 INJECTION, SOLUTION INTRAVENOUS PRN
OUTPATIENT
Start: 2024-07-12

## 2024-07-12 RX ADMIN — PIPERACILLIN AND TAZOBACTAM 4500 MG: 4; .5 INJECTION, POWDER, LYOPHILIZED, FOR SOLUTION INTRAVENOUS at 18:08

## 2024-07-12 RX ADMIN — SODIUM CHLORIDE: 9 INJECTION, SOLUTION INTRAVENOUS at 22:53

## 2024-07-12 RX ADMIN — SODIUM CHLORIDE, PRESERVATIVE FREE 30 ML: 5 INJECTION INTRAVENOUS at 08:50

## 2024-07-12 RX ADMIN — AMLODIPINE BESYLATE 5 MG: 5 TABLET ORAL at 23:31

## 2024-07-12 RX ADMIN — CALCIUM 500 MG: 500 TABLET ORAL at 23:31

## 2024-07-12 RX ADMIN — ONDANSETRON 4 MG: 2 INJECTION INTRAMUSCULAR; INTRAVENOUS at 15:05

## 2024-07-12 RX ADMIN — SODIUM CHLORIDE 1000 ML: 9 INJECTION, SOLUTION INTRAVENOUS at 15:04

## 2024-07-12 RX ADMIN — IOPAMIDOL 80 ML: 755 INJECTION, SOLUTION INTRAVENOUS at 16:31

## 2024-07-12 RX ADMIN — PIPERACILLIN AND TAZOBACTAM 3375 MG: 3; .375 INJECTION, POWDER, FOR SOLUTION INTRAVENOUS at 23:32

## 2024-07-12 RX ADMIN — ONDANSETRON HYDROCHLORIDE: 2 INJECTION INTRAMUSCULAR; INTRAVENOUS at 09:02

## 2024-07-12 RX ADMIN — SODIUM CHLORIDE, PRESERVATIVE FREE 10 ML: 5 INJECTION INTRAVENOUS at 22:55

## 2024-07-12 RX ADMIN — SODIUM CHLORIDE, PRESERVATIVE FREE 10 ML: 5 INJECTION INTRAVENOUS at 11:08

## 2024-07-12 ASSESSMENT — PAIN - FUNCTIONAL ASSESSMENT
PAIN_FUNCTIONAL_ASSESSMENT: NONE - DENIES PAIN
PAIN_FUNCTIONAL_ASSESSMENT: NONE - DENIES PAIN

## 2024-07-12 NOTE — H&P
Hospitalist - History & Physical      Patient: Tiffany CURRY [facility]    Unit/Bed:36/036A  YOB: 1950  MRN: [id number]   Acct: [id number]   PCP: Pooja Herrera APRN - CNP      Assessment and Plan:        Enteritis:   Empiric piperacillin-tazobactam  GI pathogen panel pending  Hold anti-diarrheals until pathogen panel is back  Squamous cell carcinoma of anal canal:   Following radiation and medical oncology  Currently in treatment  No chemo today due to illness  Lactic acidosis:   Presumed due to dehydration  Fluid bolus in ED, continuous infusion to follow  Trend lactic  Essential hypertension:   Continue amlodipine with parameters -hold for systolic less than 90      CC: Diarrhea, chills    HPI: Patient is referred to the emergency department from outpatient oncology infusion center where she had presented for her chemotherapy treatment.  Patient reported nausea and vomiting and diarrhea.  She has had chills.  Reports her urine is very dark almost the color of blood.  Patient did not receive her chemo treatment and was instead referred to the emergency department for further evaluation of illness.    In the ED the patient is found to have enteritis along with lactic acidosis and tachycardia.  Patient is started on empiric Zosyn and hydrated.  Patient will be admitted to [facility] telemetry for further evaluation and treatment.    ROS: Review of Systems   Constitutional:  Positive for activity change, appetite change and chills. Negative for fever.   HENT: Negative.     Eyes: Negative.    Respiratory: Negative.     Cardiovascular: Negative.    Gastrointestinal:  Positive for abdominal pain, diarrhea, nausea and vomiting.   Endocrine: Negative.    Genitourinary:  Positive for decreased urine volume.   Musculoskeletal: Negative.    Skin: Negative.    Allergic/Immunologic: Negative.    Neurological: Negative.    Hematological: Negative.    Psychiatric/Behavioral: Negative.       PMH:    Past Medical     MPV 10.4 9.4 - 12.4 fL    Platelet Estimate DECREASED Adequate   Comprehensive Metabolic Panel w/ Reflex to MG    Collection Time: 07/12/24  3:00 PM   Result Value Ref Range    Glucose 130 (H) 70 - 108 mg/dL    Creatinine 0.5 0.4 - 1.2 mg/dL    BUN 17 7 - 22 mg/dL    Sodium 133 (L) 135 - 145 meq/L    Potassium reflex Magnesium 3.6 3.5 - 5.2 meq/L    Chloride 102 98 - 111 meq/L    CO2 19 (L) 23 - 33 meq/L    Calcium 8.1 (L) 8.5 - 10.5 mg/dL    AST 36 5 - 40 U/L    Alkaline Phosphatase 51 38 - 126 U/L    Total Protein 5.5 (L) 6.1 - 8.0 g/dL    Albumin 3.1 (L) 3.5 - 5.1 g/dL    Total Bilirubin 1.8 (H) 0.3 - 1.2 mg/dL    ALT 16 11 - 66 U/L   Lactic Acid    Collection Time: 07/12/24  3:00 PM   Result Value Ref Range    Lactic Acid 2.1 (H) 0.5 - 2.0 mmol/L   TYPE AND SCREEN    Collection Time: 07/12/24  3:00 PM   Result Value Ref Range    ABO A     Rh Factor POS     Antibody Screen NEG    Lipase    Collection Time: 07/12/24  3:00 PM   Result Value Ref Range    Lipase 6.5 5.6 - 51.3 U/L   Anion Gap    Collection Time: 07/12/24  3:00 PM   Result Value Ref Range    Anion Gap 12.0 8.0 - 16.0 meq/L   Glomerular Filtration Rate, Estimated    Collection Time: 07/12/24  3:00 PM   Result Value Ref Range    Est, Glom Filt Rate > 90 >60 ml/min/1.73m2   Osmolality    Collection Time: 07/12/24  3:00 PM   Result Value Ref Range    Osmolality Calc 269.7 (L) 275.0 - 300.0 mOsmol/kg   Urine with Reflexed Micro    Collection Time: 07/12/24  3:00 PM   Result Value Ref Range    Glucose, Ur NEGATIVE NEGATIVE mg/dl    Bilirubin, Urine SMALL (A) NEGATIVE    Ketones, Urine 40 (A) NEGATIVE    Specific Gravity, UA 1.026 1.002 - 1.030    Blood, Urine SMALL (A) NEGATIVE    pH, Urine 5.5 5.0 - 9.0    Protein,  (A) NEGATIVE    Urobilinogen, Urine 1.0 0.0 - 1.0 eu/dl    Nitrite, Urine NEGATIVE NEGATIVE    Leukocyte Esterase, Urine NEGATIVE NEGATIVE    Color, UA DARK YELLOW (A) STRAW-YELLOW    Character, Urine CLOUDY (A) CLEAR-SL CLOUD    RBC,

## 2024-07-12 NOTE — PLAN OF CARE
Problem: Discharge Planning  Goal: Discharge to home or other facility with appropriate resources  Outcome: Progressing  Flowsheets (Taken 7/12/2024 0914)  Discharge to home or other facility with appropriate resources: Identify barriers to discharge with patient and caregiver  Note: Verbalize understanding of discharge instructions, follow up appointments, and when to call Physician.     Problem: Safety - Adult  Goal: Free from fall injury  Outcome: Progressing  Flowsheets (Taken 7/12/2024 0914)  Free From Fall Injury: Instruct family/caregiver on patient safety  Note: No falls occurred with visit today.     Problem: Chronic Conditions and Co-morbidities  Goal: Patient's chronic conditions and co-morbidity symptoms are monitored and maintained or improved  Outcome: Progressing  Flowsheets (Taken 7/12/2024 0914)  Care Plan - Patient's Chronic Conditions and Co-Morbidity Symptoms are Monitored and Maintained or Improved: Monitor and assess patient's chronic conditions and comorbid symptoms for stability, deterioration, or improvement  Note: Discussed indications for fluids   Care plan reviewed with patient.  Patient verbalizes understanding of the plan of care and contributes to goal setting.

## 2024-07-12 NOTE — ED NOTES
Pt to ED from cancer center c/o nausea, fatigue, weakness, and chills. Pt states she has felt this way for the last couple of weeks. Family at bedside states the patient hasn't been able to eat and has progressively gotten weaker. Pt hx of rectal cancer and is currently getting infusions to help with these sx. Pt denies chest pain or SOB. Pt  in ED, all other VSS. EKG completed. Pt arrived with port accessed. Port flushed with ease.

## 2024-07-12 NOTE — ED NOTES
ED to inpatient nurses report      Chief Complaint:  Chief Complaint   Patient presents with    Fatigue    Nausea     Present to ED from: home    MOA:     LOC: alert and orientated to name, place, date  Mobility: Requires assistance * 1  Oxygen Baseline: ra    Current needs required: ra     Code Status:   Prior    What abnormal results were found and what did you give/do to treat them?    Any procedures or intervention occur?      Mental Status:  Level of Consciousness: Alert (0)    Psych Assessment:        Vitals:  Patient Vitals for the past 24 hrs:   BP Temp Temp src Pulse Resp SpO2 Weight   07/12/24 1854 (!) 105/94 -- -- (!) 111 21 93 % --   07/12/24 1809 125/85 -- -- (!) 111 17 96 % --   07/12/24 1739 113/68 -- -- (!) 108 20 94 % --   07/12/24 1654 131/85 -- -- (!) 128 28 93 % --   07/12/24 1553 103/64 -- -- (!) 104 18 95 % --   07/12/24 1537 109/74 -- -- (!) 106 19 95 % --   07/12/24 1452 138/87 -- -- (!) 123 20 95 % --   07/12/24 1430 112/81 -- -- (!) 109 19 92 % --   07/12/24 1330 124/66 -- -- (!) 105 20 94 % --   07/12/24 1237 123/75 -- -- (!) 111 18 94 % --   07/12/24 1147 136/85 97.9 °F (36.6 °C) Oral (!) 113 18 95 % 65.3 kg (144 lb)        LDAs:      Ambulatory Status:  No data recorded    Diagnosis:  DISPOSITION Admitted 07/12/2024 07:27:03 PM   Final diagnoses:   None        Consults:  None     Pain Score:  Pain Assessment  Pain Assessment: None - Denies Pain    C-SSRS:   Risk of Suicide: No Risk    Sepsis Screening:       [place] Fall Risk:       Swallow Screening        Preferred Language:   English      ALLERGIES     Ace inhibitors    SURGICAL HISTORY       Past Surgical History:   Procedure Laterality Date    ANUS SURGERY N/A 04/18/2024    Transrectal Excision Squamous Cell Carcinoma performed by Rene Zamudio DO at [facility] OR    BREAST LUMPECTOMY Left 02/2007    COLONOSCOPY  01/27/2024    Dr. Hernandez    COLONOSCOPY  07/12/2013    [facility] Dr. Hernandez    PORT SURGERY Left 4/29/2024    SINGLE  LUMEN SMARTPORT INSERTION performed by Rene Zamudio DO at [facility] OR    TONSILLECTOMY         PAST MEDICAL HISTORY       Past Medical History:   Diagnosis Date    Anal cancer (HCC) 2024    Breast cancer (HCC) 2007    Hyperlipidemia     Hypertension            Electronically signed by Olivia Mclaughlin RN on 7/12/2024 at 7:47 PM

## 2024-07-12 NOTE — PROGRESS NOTES
Due to patient decline, Reba Ariza at chairside. Per recommendations, patient is going to be evaluated in ER. Patient discharged off unit with son. Son agreeable with plan. Electronically signed by Maite Lopez RN on 7/12/2024 at 11:15 AM

## 2024-07-12 NOTE — PROGRESS NOTES
Patient reported today to staff that she has been vomiting, feeling chills, experiencing mouth sores and extreme fatigue. She does live by herself. IV bolus administered with zofran and dexamethasone with no improvements. Staff reported patient was now experiencing chills and weakness.     Spoke with patient who reported chills are worsening. She reports she was unable to get her magic mouthwash due to cost. Patient lives at home alone, son is present at the time of exam.    Physical Exam  Vitals and nursing note reviewed.   HENT:      Mouth/Throat:      Mouth: Oral lesions (Left sided) present.   Cardiovascular:      Rate and Rhythm: Rhythm irregular.   Pulmonary:      Breath sounds: Decreased breath sounds present.   Musculoskeletal:      Right lower leg: No edema.      Left lower leg: No edema.   Skin:     Coloration: Skin is pale.   Neurological:      Mental Status: She is alert.      Motor: Tremor (Generalized) present.      Plan:  Recommend patient be evaluated by the ER. Patient lives at home alone, is a fall risk at this time. Irregular heart beat noted.  Labs not done today    Electronically signed by RUT Cruz CNP on 7/12/24 at 11:12 AM EDT

## 2024-07-12 NOTE — PROGRESS NOTES
Name: Tiffany Baldwin  : 1950  MRN: [id number]    Oncology Navigation Follow-Up Note    Contact Type:  Medical Oncology    Subjective: Pt to OP ONC today for IVF hydration.    Objective: +Fatigue, weakness, not eating-->+N/V; +mouth sores-> \"sore all the way down\".  +Diarrhea approx 5x per day.  Pt reports she takes 2 Imodium tablets in morning, & takes an additional tablet during the day.  Pt verbalized she is taking everything the doctor has told her to take.    Pt lives at home alone; son & dtr both stop in to check on her.      Oncology Plan of Care:    -Zofran & decadron added to pt's IVF hydration  -MARILEE RAMOS-NP marco in OP ONC today  -Referred to ER for evaluation & admission after NP evaluation.    Referrals: ER    Plan of Care Reviewed / Education:  yes    Assistance Needed: patient requiring support in treating her chemo-radiation toxicities.      Receptive to Advanced Care Planning / Palliative Care:  deferred    Notes: Navigation met with pt & son in OP ONC today to review home management of N/V, diarrhea with antiemetics, and BRAT/[place] diet recommendations.    Written education provided re: the above info.      Electronically signed by Jodie Benson RN on 2024 at 12:35 PM

## 2024-07-13 LAB
ANION GAP SERPL CALC-SCNC: 10 MEQ/L (ref 8–16)
BACTERIA UR CULT: ABNORMAL
BASOPHILS ABSOLUTE: 0 THOU/MM3 (ref 0–0.1)
BASOPHILS ABSOLUTE: 0 THOU/MM3 (ref 0–0.1)
BASOPHILS NFR BLD AUTO: 0 %
BASOPHILS NFR BLD AUTO: 0 %
BUN SERPL-MCNC: 19 MG/DL (ref 7–22)
BURR CELLS BLD QL SMEAR: ABNORMAL
CA-I BLD ISE-SCNC: 1.22 MMOL/L (ref 1.12–1.32)
CALCIUM SERPL-MCNC: 7.7 MG/DL (ref 8.5–10.5)
CHLORIDE SERPL-SCNC: 111 MEQ/L (ref 98–111)
CO2 SERPL-SCNC: 20 MEQ/L (ref 23–33)
CREAT SERPL-MCNC: 0.5 MG/DL (ref 0.4–1.2)
DEPRECATED RDW RBC AUTO: 42.5 FL (ref 35–45)
DEPRECATED RDW RBC AUTO: 42.9 FL (ref 35–45)
DEPRECATED RDW RBC AUTO: 43.1 FL (ref 35–45)
DEPRECATED RDW RBC AUTO: 44.6 FL (ref 35–45)
ELLIPTOCYTES: ABNORMAL
EOSINOPHIL NFR BLD AUTO: 0 %
EOSINOPHIL NFR BLD AUTO: 0 %
EOSINOPHILS ABSOLUTE: 0 THOU/MM3 (ref 0–0.4)
EOSINOPHILS ABSOLUTE: 0 THOU/MM3 (ref 0–0.4)
ERYTHROCYTE [DISTWIDTH] IN BLOOD BY AUTOMATED COUNT: 13.4 % (ref 11.5–14.5)
ERYTHROCYTE [DISTWIDTH] IN BLOOD BY AUTOMATED COUNT: 13.4 % (ref 11.5–14.5)
ERYTHROCYTE [DISTWIDTH] IN BLOOD BY AUTOMATED COUNT: 13.5 % (ref 11.5–14.5)
ERYTHROCYTE [DISTWIDTH] IN BLOOD BY AUTOMATED COUNT: 13.6 % (ref 11.5–14.5)
GFR SERPL CREATININE-BSD FRML MDRD: > 90 ML/MIN/1.73M2
GLUCOSE SERPL-MCNC: 112 MG/DL (ref 70–108)
HCT VFR BLD AUTO: 22.6 % (ref 37–47)
HCT VFR BLD AUTO: 25 % (ref 37–47)
HCT VFR BLD AUTO: 25.6 % (ref 37–47)
HCT VFR BLD AUTO: 31.6 % (ref 37–47)
HGB BLD-MCNC: 10.8 GM/DL (ref 12–16)
HGB BLD-MCNC: 7.6 GM/DL (ref 12–16)
HGB BLD-MCNC: 8.3 GM/DL (ref 12–16)
HGB BLD-MCNC: 8.5 GM/DL (ref 12–16)
IMM GRANULOCYTES # BLD AUTO: 0 THOU/MM3 (ref 0–0.07)
IMM GRANULOCYTES # BLD AUTO: 0.03 THOU/MM3 (ref 0–0.07)
IMM GRANULOCYTES NFR BLD AUTO: 0 %
IMM GRANULOCYTES NFR BLD AUTO: 20 %
LYMPHOCYTES ABSOLUTE: 0 THOU/MM3 (ref 1–4.8)
LYMPHOCYTES ABSOLUTE: 0 THOU/MM3 (ref 1–4.8)
LYMPHOCYTES NFR BLD AUTO: 12.5 %
LYMPHOCYTES NFR BLD AUTO: 6.7 %
MAGNESIUM SERPL-MCNC: 1.7 MG/DL (ref 1.6–2.4)
MCH RBC QN AUTO: 29.7 PG (ref 26–33)
MCH RBC QN AUTO: 29.8 PG (ref 26–33)
MCH RBC QN AUTO: 30.1 PG (ref 26–33)
MCH RBC QN AUTO: 30.3 PG (ref 26–33)
MCHC RBC AUTO-ENTMCNC: 32.4 GM/DL (ref 32.2–35.5)
MCHC RBC AUTO-ENTMCNC: 33.6 GM/DL (ref 32.2–35.5)
MCHC RBC AUTO-ENTMCNC: 34 GM/DL (ref 32.2–35.5)
MCHC RBC AUTO-ENTMCNC: 34.2 GM/DL (ref 32.2–35.5)
MCV RBC AUTO: 88.5 FL (ref 81–99)
MCV RBC AUTO: 88.6 FL (ref 81–99)
MCV RBC AUTO: 88.7 FL (ref 81–99)
MCV RBC AUTO: 91.8 FL (ref 81–99)
MONOCYTES ABSOLUTE: 0.1 THOU/MM3 (ref 0.4–1.3)
MONOCYTES ABSOLUTE: 0.1 THOU/MM3 (ref 0.4–1.3)
MONOCYTES NFR BLD AUTO: 31.3 %
MONOCYTES NFR BLD AUTO: 60 %
NEUTROPHILS ABSOLUTE: 0 THOU/MM3 (ref 1.8–7.7)
NEUTROPHILS ABSOLUTE: 0.1 THOU/MM3 (ref 1.8–7.7)
NEUTROPHILS NFR BLD AUTO: 13.3 %
NEUTROPHILS NFR BLD AUTO: 56.2 %
NRBC BLD AUTO-RTO: 0 /100 WBC
NRBC BLD AUTO-RTO: 0 /100 WBC
ORGANISM: ABNORMAL
PATHOLOGIST REVIEW: ABNORMAL
PLATELET # BLD AUTO: 13 THOU/MM3 (ref 130–400)
PLATELET # BLD AUTO: 28 THOU/MM3 (ref 130–400)
PLATELET # BLD AUTO: 6 THOU/MM3 (ref 130–400)
PLATELET # BLD AUTO: 7 THOU/MM3 (ref 130–400)
PLATELET BLD QL SMEAR: ABNORMAL
PMV BLD AUTO: 10.2 FL (ref 9.4–12.4)
PMV BLD AUTO: 10.4 FL (ref 9.4–12.4)
PMV BLD AUTO: 10.9 FL (ref 9.4–12.4)
PMV BLD AUTO: 13.1 FL (ref 9.4–12.4)
POTASSIUM SERPL-SCNC: 3.5 MEQ/L (ref 3.5–5.2)
RBC # BLD AUTO: 2.55 MILL/MM3 (ref 4.2–5.4)
RBC # BLD AUTO: 2.79 MILL/MM3 (ref 4.2–5.4)
RBC # BLD AUTO: 2.82 MILL/MM3 (ref 4.2–5.4)
RBC # BLD AUTO: 3.57 MILL/MM3 (ref 4.2–5.4)
SCAN OF BLOOD SMEAR: NORMAL
SCAN OF BLOOD SMEAR: NORMAL
SODIUM SERPL-SCNC: 141 MEQ/L (ref 135–145)
VARIANT LYMPHS BLD QL SMEAR: ABNORMAL %
WBC # BLD AUTO: 0.2 THOU/MM3 (ref 4.8–10.8)
WBC # BLD AUTO: 0.3 THOU/MM3 (ref 4.8–10.8)

## 2024-07-13 PROCEDURE — 80048 BASIC METABOLIC PNL TOTAL CA: CPT

## 2024-07-13 PROCEDURE — 6370000000 HC RX 637 (ALT 250 FOR IP): Performed by: PHYSICIAN ASSISTANT

## 2024-07-13 PROCEDURE — 1200000000 HC SEMI PRIVATE

## 2024-07-13 PROCEDURE — 30233K1 TRANSFUSION OF NONAUTOLOGOUS FROZEN PLASMA INTO PERIPHERAL VEIN, PERCUTANEOUS APPROACH: ICD-10-PCS | Performed by: INTERNAL MEDICINE

## 2024-07-13 PROCEDURE — 30233N1 TRANSFUSION OF NONAUTOLOGOUS RED BLOOD CELLS INTO PERIPHERAL VEIN, PERCUTANEOUS APPROACH: ICD-10-PCS | Performed by: INTERNAL MEDICINE

## 2024-07-13 PROCEDURE — 36591 DRAW BLOOD OFF VENOUS DEVICE: CPT

## 2024-07-13 PROCEDURE — 1200000003 HC TELEMETRY R&B

## 2024-07-13 PROCEDURE — 83735 ASSAY OF MAGNESIUM: CPT

## 2024-07-13 PROCEDURE — 2580000003 HC RX 258: Performed by: STUDENT IN AN ORGANIZED HEALTH CARE EDUCATION/TRAINING PROGRAM

## 2024-07-13 PROCEDURE — 6360000002 HC RX W HCPCS: Performed by: PHYSICIAN ASSISTANT

## 2024-07-13 PROCEDURE — 36415 COLL VENOUS BLD VENIPUNCTURE: CPT

## 2024-07-13 PROCEDURE — 85027 COMPLETE CBC AUTOMATED: CPT

## 2024-07-13 PROCEDURE — 2580000003 HC RX 258: Performed by: PHYSICIAN ASSISTANT

## 2024-07-13 PROCEDURE — P9037 PLATE PHERES LEUKOREDU IRRAD: HCPCS

## 2024-07-13 PROCEDURE — 82330 ASSAY OF CALCIUM: CPT

## 2024-07-13 PROCEDURE — 30233R1 TRANSFUSION OF NONAUTOLOGOUS PLATELETS INTO PERIPHERAL VEIN, PERCUTANEOUS APPROACH: ICD-10-PCS | Performed by: INTERNAL MEDICINE

## 2024-07-13 PROCEDURE — 6370000000 HC RX 637 (ALT 250 FOR IP): Performed by: STUDENT IN AN ORGANIZED HEALTH CARE EDUCATION/TRAINING PROGRAM

## 2024-07-13 PROCEDURE — 99233 SBSQ HOSP IP/OBS HIGH 50: CPT | Performed by: STUDENT IN AN ORGANIZED HEALTH CARE EDUCATION/TRAINING PROGRAM

## 2024-07-13 PROCEDURE — 36430 TRANSFUSION BLD/BLD COMPNT: CPT

## 2024-07-13 RX ORDER — SODIUM CHLORIDE 9 MG/ML
INJECTION, SOLUTION INTRAVENOUS PRN
Status: COMPLETED | OUTPATIENT
Start: 2024-07-13 | End: 2024-07-13

## 2024-07-13 RX ADMIN — ONDANSETRON 4 MG: 2 INJECTION INTRAMUSCULAR; INTRAVENOUS at 18:13

## 2024-07-13 RX ADMIN — ACETAMINOPHEN 650 MG: 325 TABLET ORAL at 16:09

## 2024-07-13 RX ADMIN — SODIUM CHLORIDE: 9 INJECTION, SOLUTION INTRAVENOUS at 14:53

## 2024-07-13 RX ADMIN — CALCIUM 500 MG: 500 TABLET ORAL at 21:10

## 2024-07-13 RX ADMIN — SODIUM CHLORIDE: 9 INJECTION, SOLUTION INTRAVENOUS at 07:43

## 2024-07-13 RX ADMIN — AMLODIPINE BESYLATE 5 MG: 5 TABLET ORAL at 21:10

## 2024-07-13 RX ADMIN — PIPERACILLIN AND TAZOBACTAM 3375 MG: 3; .375 INJECTION, POWDER, FOR SOLUTION INTRAVENOUS at 08:43

## 2024-07-13 RX ADMIN — SODIUM CHLORIDE: 9 INJECTION, SOLUTION INTRAVENOUS at 13:17

## 2024-07-13 RX ADMIN — ACETAMINOPHEN 650 MG: 325 TABLET ORAL at 22:32

## 2024-07-13 RX ADMIN — CALCIUM 500 MG: 500 TABLET ORAL at 08:44

## 2024-07-13 RX ADMIN — PIPERACILLIN AND TAZOBACTAM 3375 MG: 3; .375 INJECTION, POWDER, FOR SOLUTION INTRAVENOUS at 16:05

## 2024-07-13 ASSESSMENT — PAIN - FUNCTIONAL ASSESSMENT: PAIN_FUNCTIONAL_ASSESSMENT: ACTIVITIES ARE NOT PREVENTED

## 2024-07-13 ASSESSMENT — PAIN DESCRIPTION - LOCATION
LOCATION: RECTUM
LOCATION: ABDOMEN

## 2024-07-13 ASSESSMENT — PAIN SCALES - GENERAL
PAINLEVEL_OUTOF10: 3
PAINLEVEL_OUTOF10: 4
PAINLEVEL_OUTOF10: 0

## 2024-07-13 ASSESSMENT — PAIN DESCRIPTION - DESCRIPTORS: DESCRIPTORS: ACHING

## 2024-07-13 ASSESSMENT — PAIN DESCRIPTION - ORIENTATION: ORIENTATION: INNER

## 2024-07-13 NOTE — PROGRESS NOTES
Hospitalist Progress Note    Patient:  Tiffany CURRY [facility]      Unit/Bed:5K-06/006-A    YOB: 1950    MRN: [id number]       Acct: [id number]     PCP: Pooja Herrera APRN - CNP    Date of Admission: 7/12/2024    Assessment/Plan:    Enteritis: Patient presented with diarrhea and fatigue.  CT abdomen pelvis with IV contrast shows moderate mucosal thickening noted in the mid and distal small bowel loops as well as diffuse colonic wall thickening suggesting enterocolitis. GI panel pending. Will continue Zosyn  Thrombocytopenia: Platelet level 7 today.  No signs of active bleeding, Ordered 2 units of irradiated platelets.  Oncology recommends transfusing if platelets below 20,000.  Will hold Lovenox and place SCDs.  Neutropenic precautions: WBC 0.2, ANC 0.1.  Neutropenia precautions placed. Continue to monitor daily CBC with differential.  Squamous cell carcinoma of anal canal: Cancers to age on 4/5/2024, clinical stage IIa.  Patient has been receiving chemotherapy, OP mitomycin and fluorouracil D1-4.  Patient also receiving radiation.  Last radiation session was scheduled for next week.  She follows with [facility] cancer center.  Oncology consulted and following.  Lactic acidosis: Resolved. Initial lactic 2.1.  Repeat 1.3  Primary hypertension: Continue Norvasc 5 mg oral daily.  History of breast cancer in 2007: Noted.        Expected discharge date: TBD    Disposition:    [x] Home       [] TCU       [] Rehab       [] Psych       [] SNF       [] Long Term Care Facility       [] Other-    Chief Complaint: Diarrhea, chills    Hospital Course: Per initial HPI \"Patient is referred to the emergency department from outpatient oncology infusion center where she had presented for her chemotherapy treatment.  Patient reported nausea and vomiting and diarrhea.  She has had chills.  Reports her urine is very dark almost the color of blood.  Patient did not receive her chemo treatment and was instead referred to  appears stated age and cooperative.  HEENT: Pupils equal, round, and reactive to light. Conjunctivae/corneas clear.  Neck: Supple, with full range of motion. No jugular venous distention. Trachea midline.  Respiratory:  Normal respiratory effort. Clear to auscultation, bilaterally without Rales/Wheezes/Rhonchi.  Cardiovascular: Regular rate and rhythm with normal S1/S2 without murmurs, rubs or gallops.  Abdomen: Soft, non-tender, non-distended with normal bowel sounds.  Musculoskeletal: passive and active ROM x 4 extremities.  Skin: Skin color, texture, turgor normal.  No rashes or lesions.  Neurologic:  Neurovascularly intact without any focal sensory/motor deficits. Cranial nerves: II-XII intact, grossly non-focal.  Psychiatric: Alert and oriented, thought content appropriate, normal insight  Capillary Refill: Brisk,< 3 seconds   Peripheral Pulses: +2 palpable, equal bilaterally       Labs:   Recent Labs     07/13/24  0630 07/13/24  1045 07/13/24  1713   WBC 0.2* 0.2* 0.3*   HGB 8.3* 8.5* 7.6*   HCT 25.6* 25.0* 22.6*   PLT 7* 6* 28*     Recent Labs     07/12/24  1500 07/13/24  0630   * 141   K 3.6 3.5    111   CO2 19* 20*   BUN 17 19   CREATININE 0.5 0.5   CALCIUM 8.1* 7.7*     Recent Labs     07/12/24  1500   AST 36   ALT 16   BILITOT 1.8*   ALKPHOS 51     No results for input(s): \"INR\" in the last 72 hours.  No results for input(s): \"CKTOTAL\", \"TROPONINI\" in the last 72 hours.    Microbiology:      Urinalysis:      Lab Results   Component Value Date/Time    NITRU NEGATIVE 07/12/2024 03:00 PM    WBCUA 5-9 07/12/2024 03:00 PM    BACTERIA FEW 07/12/2024 03:00 PM    RBCUA 3-5 07/12/2024 03:00 PM    BLOODU SMALL 07/12/2024 03:00 PM    GLUCOSEU NEGATIVE 07/12/2024 03:00 PM       Radiology:  CT ABDOMEN PELVIS W IV CONTRAST Additional Contrast? None   Final Result   1. Moderate mucosal thickening is noted in the mid and distal small bowel loops   as well as diffuse colonic wall thickening suggesting

## 2024-07-13 NOTE — PROGRESS NOTES
Patient placed in neutropenic precautions at this time, WBC 0.2, ANC 0.1. CBC with diff recommended daily. Recommend transfusion of platelets for platelet less than 20,000. Communicated recommendations with hospitalist. Will see on Monday.    Electronically signed by RUT Cruz CNP on 7/13/24 at 2:22 PM EDT

## 2024-07-13 NOTE — CONSENT
Informed Consent for Blood Component Transfusion Note    I have discussed with the patient the rationale for blood component transfusion; its benefits in treating or preventing fatigue, organ damage, or death; and its risk which includes mild transfusion reactions, rare risk of blood borne infection, or more serious but rare reactions. I have discussed the alternatives to transfusion, including the risk and consequences of not receiving transfusion. The patient had an opportunity to ask questions and had agreed to proceed with transfusion of blood components.    Electronically signed by Andreia Crews DO on 7/13/24 at 12:05 PM EDT

## 2024-07-13 NOTE — PLAN OF CARE
Problem: Discharge Planning  Goal: Discharge to home or other facility with appropriate resources  Outcome: Progressing     Problem: Safety - Adult  Goal: Free from fall injury  Outcome: Progressing   All fall precautions in place. Bed in low position, alarm activated and appropriate use of call light.      Problem: Skin/Tissue Integrity  Goal: Absence of new skin breakdown  Description: 1.  Monitor for areas of redness and/or skin breakdown  2.  Assess vascular access sites hourly  3.  Every 4-6 hours minimum:  Change oxygen saturation probe site  4.  Every 4-6 hours:  If on nasal continuous positive airway pressure, respiratory therapy assess nares and determine need for appliance change or resting period.  Outcome: Progressing     Problem: Musculoskeletal - Adult  Goal: Return ADL status to a safe level of function  Outcome: Progressing     Problem: Gastrointestinal - Adult  Goal: Maintains or returns to baseline bowel function  Outcome: Progressing     Problem: Pain  Goal: Verbalizes/displays adequate comfort level or baseline comfort level  Outcome: Progressing   Pain Assessment: None - Denies Pain          Is pain goal met at this time?  Yes          Care plan reviewed with patient. Patient verbalizes understanding with the plan of care.

## 2024-07-13 NOTE — PROGRESS NOTES
Pt admitted to  5K6 via in a wheelchair from ED.  Complaints: Enteritis   IV into the subclavian left, condition patent and no redness. IV site free of s/s of infection or infiltration. Vital signs obtained. Assessment and data collection initiated. Two nurse skin assessment performed by Marilu NOLASCO and Antonio RN. Oriented to room. Policies and procedures for 5 explained. All questions answered with no further questions at this time. Fall prevention and safety brochure discussed with patient.  Bed alarm on. Call light in reach.Oriented to room.   Marilu Perez RN, RN 7/13/2024 2:22 AM     Explained patients right to have family, representative or physician notified of their admission.  Patient has Declined for physician to be notified.  Patient has Declined for family/representative to be notified.

## 2024-07-13 NOTE — PLAN OF CARE
Problem: Discharge Planning  Goal: Discharge to home or other facility with appropriate resources  Outcome: Progressing  Discharge plan is in process. Plan discharge home with family.     Problem: Safety - Adult  Goal: Free from fall injury  Outcome: Progressing  Fall assessment completed. Patient using call light appropriately to call for assistance with ambulation to bathroom.  Personal items within reach. Patient is also compliant with use of non-skid slippers.      Problem: Skin/Tissue Integrity  Goal: Absence of new skin breakdown  Description: 1.  Monitor for areas of redness and/or skin breakdown  2.  Assess vascular access sites hourly  3.  Every 4-6 hours minimum:  Change oxygen saturation probe site  4.  Every 4-6 hours:  If on nasal continuous positive airway pressure, respiratory therapy assess nares and determine need for appliance change or resting period.  Outcome: Progressing  No skin breakdown this shift. Patient being assisted with turning. Patients states understanding of repositioning every two hours.     Problem: Musculoskeletal - Adult  Goal: Return ADL status to a safe level of function  Outcome: Progressing  Patient ambulates with 1 assist. Gait unsteady. Patient able to perform passive ROM.     Problem: Gastrointestinal - Adult  Goal: Maintains or returns to baseline bowel function  Outcome: Progressing  Patient bowel sounds active.  Passing flatus.  Pt taking prescribed medication to assist with BM.     Problem: Pain  Goal: Verbalizes/displays adequate comfort level or baseline comfort level  Outcome: Progressing  Patient states pain relief from PRN pain medications. Pain reassessed one hour post PRN pain medication given.  Patient rates pain 3 on GLORIA 0-10 scale.    Care plan reviewed with patient and family. Patient and family verbalized understanding.

## 2024-07-14 LAB
ACB COMPLEX DNA BLD POS QL NAA+NON-PROBE: NOT DETECTED
ALBUMIN SERPL BCG-MCNC: 2.7 G/DL (ref 3.5–5.1)
ALP SERPL-CCNC: 53 U/L (ref 38–126)
ALT SERPL W/O P-5'-P-CCNC: 21 U/L (ref 11–66)
ANION GAP SERPL CALC-SCNC: 8 MEQ/L (ref 8–16)
AST SERPL-CCNC: 27 U/L (ref 5–40)
B FRAGILIS DNA BLD POS QL NAA+NON-PROBE: NOT DETECTED
BASOPHILS ABSOLUTE: 0 THOU/MM3 (ref 0–0.1)
BASOPHILS NFR BLD AUTO: 2 %
BILIRUB CONJ SERPL-MCNC: 2 MG/DL (ref 0.1–13.8)
BILIRUB SERPL-MCNC: 2.8 MG/DL (ref 0.3–1.2)
BLACTX-M ISLT/SPM QL: NORMAL
BLAIMP ISLT/SPM QL: NORMAL
BLAKPC ISLT/SPM QL: NORMAL
BLAOXA-48-LIKE ISLT/SPM QL: NORMAL
BLAVIM ISLT/SPM QL: NORMAL
BOTTLE TYPE: NORMAL
BUN SERPL-MCNC: 22 MG/DL (ref 7–22)
BURR CELLS BLD QL SMEAR: ABNORMAL
C ALBICANS DNA BLD POS QL NAA+NON-PROBE: NOT DETECTED
C AURIS DNA BLD POS QL NAA+NON-PROBE: NOT DETECTED
C CAYETANENSIS DNA SPEC QL NAA+PROBE: NOT DETECTED
C GATTII+NEOFOR DNA BLD POS QL NAA+N-PRB: NOT DETECTED
C GLABRATA DNA BLD POS QL NAA+NON-PROBE: NOT DETECTED
C KRUSEI DNA BLD POS QL NAA+NON-PROBE: NOT DETECTED
C PARAP DNA BLD POS QL NAA+NON-PROBE: NOT DETECTED
C TROPICLS DNA BLD POS QL NAA+NON-PROBE: NOT DETECTED
CA-I BLD ISE-SCNC: 1.15 MMOL/L (ref 1.12–1.32)
CALCIUM SERPL-MCNC: 7.9 MG/DL (ref 8.5–10.5)
CAMPY SP DNA.DIARRHEA STL QL NAA+PROBE: NOT DETECTED
CHLORIDE SERPL-SCNC: 108 MEQ/L (ref 98–111)
CO2 SERPL-SCNC: 24 MEQ/L (ref 23–33)
COAG NEG STAPH DNA BLD QL NAA+PROBE: NOT DETECTED
COLISTIN RES MCR-1 ISLT/SPM QL: NORMAL
CREAT SERPL-MCNC: 0.4 MG/DL (ref 0.4–1.2)
CRYPTOSP DNA SPEC QL NAA+PROBE: NOT DETECTED
DEPRECATED RDW RBC AUTO: 43.2 FL (ref 35–45)
E CLOAC COMP DNA BLD POS NAA+NON-PROBE: NOT DETECTED
E COLI DNA BLD POS QL NAA+NON-PROBE: NOT DETECTED
E COLI O157H7 DNA SPEC QL NAA+PROBE: ABNORMAL
E FAECALIS DNA BLD POS QL NAA+NON-PROBE: NOT DETECTED
E FAECIUM DNA BLD POS QL NAA+NON-PROBE: NOT DETECTED
E HISTOLYT DNA SPEC QL NAA+PROBE: NOT DETECTED
EAEC PAA PLAS AGGR+AATA ST NAA+NON-PRB: NOT DETECTED
EC STX1+STX2 + H7 FLIC SPEC NAA+PROBE: NOT DETECTED
EKG ATRIAL RATE: 99 BPM
EKG P AXIS: 21 DEGREES
EKG P-R INTERVAL: 104 MS
EKG Q-T INTERVAL: 354 MS
EKG QRS DURATION: 84 MS
EKG QTC CALCULATION (BAZETT): 454 MS
EKG R AXIS: 37 DEGREES
EKG T AXIS: -12 DEGREES
EKG VENTRICULAR RATE: 99 BPM
ENTEROBACTERALES DNA BLD POS NAA+N-PRB: NOT DETECTED
EOSINOPHIL NFR BLD AUTO: 2 %
EOSINOPHILS ABSOLUTE: 0 THOU/MM3 (ref 0–0.4)
EPEC EAE GENE STL QL NAA+NON-PROBE: DETECTED
ERYTHROCYTE [DISTWIDTH] IN BLOOD BY AUTOMATED COUNT: 13.7 % (ref 11.5–14.5)
ETEC LTA+ST1A+ST1B TOX ST NAA+NON-PROBE: NOT DETECTED
G LAMBLIA DNA SPEC QL NAA+PROBE: NOT DETECTED
GFR SERPL CREATININE-BSD FRML MDRD: > 90 ML/MIN/1.73M2
GLUCOSE SERPL-MCNC: 105 MG/DL (ref 70–108)
GP B STREP DNA SPEC QL NAA+PROBE: NOT DETECTED
GP B STREP DNA SPEC QL NAA+PROBE: NOT DETECTED
HADV DNA SPEC QL NAA+PROBE: NOT DETECTED
HAEM INFLU DNA BLD POS QL NAA+NON-PROBE: NOT DETECTED
HASTV RNA SPEC QL NAA+PROBE: NOT DETECTED
HCT VFR BLD AUTO: 25.9 % (ref 37–47)
HGB BLD-MCNC: 8.8 GM/DL (ref 12–16)
IMM GRANULOCYTES # BLD AUTO: 0.02 THOU/MM3 (ref 0–0.07)
IMM GRANULOCYTES NFR BLD AUTO: 3.9 %
K OXYTOCA DNA BLD POS QL NAA+NON-PROBE: NOT DETECTED
K OXYTOCA DNA BLD POS QL NAA+NON-PROBE: NOT DETECTED
KLEBSIELLA SP DNA BLD POS QL NAA+NON-PRB: NOT DETECTED
L MONOCYTOG DNA BLD POS QL NAA+NON-PROBE: NOT DETECTED
LYMPHOCYTES ABSOLUTE: 0 THOU/MM3 (ref 1–4.8)
LYMPHOCYTES NFR BLD AUTO: 5.9 %
MAGNESIUM SERPL-MCNC: 1.8 MG/DL (ref 1.6–2.4)
MCH RBC QN AUTO: 29.9 PG (ref 26–33)
MCHC RBC AUTO-ENTMCNC: 34 GM/DL (ref 32.2–35.5)
MCV RBC AUTO: 88.1 FL (ref 81–99)
MECA ISLT/SPM QL: NORMAL
MECA+MECC+MREJ ISLT/SPM QL: NORMAL
MONOCYTES ABSOLUTE: 0.1 THOU/MM3 (ref 0.4–1.3)
MONOCYTES NFR BLD AUTO: 11.8 %
MRSA DNA SPEC QL NAA+PROBE: NEGATIVE
N MEN DNA BLD POS QL NAA+NON-PROBE: NOT DETECTED
NDM: NORMAL
NEUTROPHILS ABSOLUTE: 0.4 THOU/MM3 (ref 1.8–7.7)
NEUTROPHILS NFR BLD AUTO: 74.4 %
NOROVIRUS GI + GII RNA STL NAA+PROBE: NOT DETECTED
NRBC BLD AUTO-RTO: 0 /100 WBC
P AERUGINOSA DNA BLD POS NAA+NON-PROBE: NOT DETECTED
P SHIGELLOIDES DNA STL QL NAA+PROBE: NOT DETECTED
PHOSPHATE SERPL-MCNC: 1.8 MG/DL (ref 2.4–4.7)
PHOSPHATE SERPL-MCNC: 2.5 MG/DL (ref 2.4–4.7)
PLATELET # BLD AUTO: 21 THOU/MM3 (ref 130–400)
PLATELET BLD QL SMEAR: ABNORMAL
PMV BLD AUTO: 10.7 FL (ref 9.4–12.4)
POTASSIUM SERPL-SCNC: 3 MEQ/L (ref 3.5–5.2)
POTASSIUM SERPL-SCNC: 3.9 MEQ/L (ref 3.5–5.2)
PROT SERPL-MCNC: 4.9 G/DL (ref 6.1–8)
PROTEUS SPP: NOT DETECTED
RBC # BLD AUTO: 2.94 MILL/MM3 (ref 4.2–5.4)
REASON FOR REJECTION: NORMAL
REJECTED TEST: NORMAL
RV RNA SPEC QL NAA+PROBE: NOT DETECTED
S AUREUS DNA BLD POS QL NAA+NON-PROBE: NOT DETECTED
S EPIDERMIDIS DNA BLD POS QL NAA+NON-PRB: NOT DETECTED
S LUGDUNENSIS DNA BLD POS QL NAA+NON-PRB: NOT DETECTED
S MALTOPHILIA DNA BLD POS QL NAA+NON-PRB: NOT DETECTED
S MARCESCENS DNA BLD POS NAA+NON-PROBE: NOT DETECTED
S PYO DNA THROAT QL NAA+PROBE: NOT DETECTED
SALMONELLA DNA BLD POS QL NAA+NON-PROBE: NOT DETECTED
SALMONELLA DNA SPEC QL NAA+PROBE: NOT DETECTED
SAPOVIRUS RNA SPEC QL NAA+PROBE: NOT DETECTED
SCAN OF BLOOD SMEAR: NORMAL
SHIGELLA SP+EIEC IPAH ST NAA+NON-PROBE: NOT DETECTED
SODIUM SERPL-SCNC: 140 MEQ/L (ref 135–145)
SOURCE OF BLOOD CULTURE: NORMAL
STREPTOCOCCUS DNA BLD QL NAA+PROBE: NOT DETECTED
TOXIC GRANULES BLD QL SMEAR: PRESENT
TSH SERPL DL<=0.005 MIU/L-ACNC: 2.75 UIU/ML (ref 0.4–4.2)
V CHOLERAE DNA SPEC QL NAA+PROBE: NOT DETECTED
VANA+VANB ISLT/SPM QL: NORMAL
VIBRIO DNA SPEC NAA+PROBE: NOT DETECTED
WBC # BLD AUTO: 0.5 THOU/MM3 (ref 4.8–10.8)
Y ENTERO RECN STL QL NAA+PROBE: NOT DETECTED

## 2024-07-14 PROCEDURE — 2580000003 HC RX 258: Performed by: PHYSICIAN ASSISTANT

## 2024-07-14 PROCEDURE — 2580000003 HC RX 258: Performed by: STUDENT IN AN ORGANIZED HEALTH CARE EDUCATION/TRAINING PROGRAM

## 2024-07-14 PROCEDURE — 93010 ELECTROCARDIOGRAM REPORT: CPT | Performed by: INTERNAL MEDICINE

## 2024-07-14 PROCEDURE — 84100 ASSAY OF PHOSPHORUS: CPT

## 2024-07-14 PROCEDURE — 1200000003 HC TELEMETRY R&B

## 2024-07-14 PROCEDURE — 83735 ASSAY OF MAGNESIUM: CPT

## 2024-07-14 PROCEDURE — 84443 ASSAY THYROID STIM HORMONE: CPT

## 2024-07-14 PROCEDURE — 6370000000 HC RX 637 (ALT 250 FOR IP): Performed by: PHYSICIAN ASSISTANT

## 2024-07-14 PROCEDURE — 1200000000 HC SEMI PRIVATE

## 2024-07-14 PROCEDURE — 99233 SBSQ HOSP IP/OBS HIGH 50: CPT | Performed by: STUDENT IN AN ORGANIZED HEALTH CARE EDUCATION/TRAINING PROGRAM

## 2024-07-14 PROCEDURE — 87641 MR-STAPH DNA AMP PROBE: CPT

## 2024-07-14 PROCEDURE — 82248 BILIRUBIN DIRECT: CPT

## 2024-07-14 PROCEDURE — 6360000002 HC RX W HCPCS: Performed by: PHYSICIAN ASSISTANT

## 2024-07-14 PROCEDURE — 82330 ASSAY OF CALCIUM: CPT

## 2024-07-14 PROCEDURE — 84132 ASSAY OF SERUM POTASSIUM: CPT

## 2024-07-14 PROCEDURE — 6370000000 HC RX 637 (ALT 250 FOR IP): Performed by: STUDENT IN AN ORGANIZED HEALTH CARE EDUCATION/TRAINING PROGRAM

## 2024-07-14 PROCEDURE — 80053 COMPREHEN METABOLIC PANEL: CPT

## 2024-07-14 PROCEDURE — 93005 ELECTROCARDIOGRAM TRACING: CPT | Performed by: STUDENT IN AN ORGANIZED HEALTH CARE EDUCATION/TRAINING PROGRAM

## 2024-07-14 PROCEDURE — 85025 COMPLETE CBC W/AUTO DIFF WBC: CPT

## 2024-07-14 PROCEDURE — 87507 IADNA-DNA/RNA PROBE TQ 12-25: CPT

## 2024-07-14 PROCEDURE — 2500000003 HC RX 250 WO HCPCS: Performed by: STUDENT IN AN ORGANIZED HEALTH CARE EDUCATION/TRAINING PROGRAM

## 2024-07-14 RX ORDER — CALCIUM GLUCONATE 20 MG/ML
2000 INJECTION, SOLUTION INTRAVENOUS PRN
Status: DISCONTINUED | OUTPATIENT
Start: 2024-07-14 | End: 2024-07-25 | Stop reason: HOSPADM

## 2024-07-14 RX ADMIN — SODIUM PHOSPHATE, MONOBASIC, MONOHYDRATE AND SODIUM PHOSPHATE, DIBASIC, ANHYDROUS 15 MMOL: 142; 276 INJECTION, SOLUTION INTRAVENOUS at 21:31

## 2024-07-14 RX ADMIN — POTASSIUM CHLORIDE 10 MEQ: 7.46 INJECTION, SOLUTION INTRAVENOUS at 09:33

## 2024-07-14 RX ADMIN — ACETAMINOPHEN 650 MG: 325 TABLET ORAL at 20:35

## 2024-07-14 RX ADMIN — SODIUM PHOSPHATE, MONOBASIC, MONOHYDRATE AND SODIUM PHOSPHATE, DIBASIC, ANHYDROUS 15 MMOL: 142; 276 INJECTION, SOLUTION INTRAVENOUS at 12:38

## 2024-07-14 RX ADMIN — CALCIUM 500 MG: 500 TABLET ORAL at 20:35

## 2024-07-14 RX ADMIN — SODIUM CHLORIDE: 9 INJECTION, SOLUTION INTRAVENOUS at 10:25

## 2024-07-14 RX ADMIN — PIPERACILLIN AND TAZOBACTAM 3375 MG: 3; .375 INJECTION, POWDER, FOR SOLUTION INTRAVENOUS at 08:43

## 2024-07-14 RX ADMIN — POTASSIUM CHLORIDE 10 MEQ: 7.46 INJECTION, SOLUTION INTRAVENOUS at 10:25

## 2024-07-14 RX ADMIN — PIPERACILLIN AND TAZOBACTAM 3375 MG: 3; .375 INJECTION, POWDER, FOR SOLUTION INTRAVENOUS at 15:58

## 2024-07-14 RX ADMIN — POTASSIUM CHLORIDE 10 MEQ: 7.46 INJECTION, SOLUTION INTRAVENOUS at 12:35

## 2024-07-14 RX ADMIN — POTASSIUM CHLORIDE 10 MEQ: 7.46 INJECTION, SOLUTION INTRAVENOUS at 08:44

## 2024-07-14 RX ADMIN — POTASSIUM CHLORIDE 10 MEQ: 7.46 INJECTION, SOLUTION INTRAVENOUS at 13:39

## 2024-07-14 RX ADMIN — POTASSIUM CHLORIDE 10 MEQ: 7.46 INJECTION, SOLUTION INTRAVENOUS at 11:28

## 2024-07-14 RX ADMIN — PIPERACILLIN AND TAZOBACTAM 3375 MG: 3; .375 INJECTION, POWDER, FOR SOLUTION INTRAVENOUS at 01:12

## 2024-07-14 RX ADMIN — AMLODIPINE BESYLATE 5 MG: 5 TABLET ORAL at 20:35

## 2024-07-14 RX ADMIN — CALCIUM 500 MG: 500 TABLET ORAL at 08:45

## 2024-07-14 ASSESSMENT — PAIN DESCRIPTION - LOCATION: LOCATION: ABDOMEN

## 2024-07-14 ASSESSMENT — PAIN DESCRIPTION - ORIENTATION: ORIENTATION: RIGHT;LEFT;UPPER;LOWER

## 2024-07-14 ASSESSMENT — PAIN SCALES - GENERAL: PAINLEVEL_OUTOF10: 3

## 2024-07-14 ASSESSMENT — PAIN - FUNCTIONAL ASSESSMENT: PAIN_FUNCTIONAL_ASSESSMENT: ACTIVITIES ARE NOT PREVENTED

## 2024-07-14 ASSESSMENT — PAIN DESCRIPTION - DESCRIPTORS: DESCRIPTORS: DULL

## 2024-07-14 NOTE — PROGRESS NOTES
Hospitalist Progress Note    [facility]      [facility]5K-06/006-A    YOB: 1950    MRN: [id number]       Acct: [id number]     PCP: Pooja Herrera APRN - CNP    Date of Admission: 7/12/2024    Assessment/Plan:    Enteritis, EPEC: Patient presented with diarrhea and fatigue.  CT abdomen pelvis with IV contrast shows moderate mucosal thickening noted in the mid and distal small bowel loops as well as diffuse colonic wall thickening suggesting enterocolitis. GI panel positive for E. Coli enteropathogenic. Will continue Zosyn and supportive care. Consider GI consult if symptoms do not improve.   Thrombocytopenia: S/p 2 units of irradiated platelets on 7/13/24. Platelet level 21 today. No signs of active bleeding.  Oncology recommends transfusing if platelets below 20,000.  Will hold Lovenox and place SCDs. Oncology to see patient tomorrow, 7/15.   Neutropenic precautions: WBC 0.5 today.  Neutropenia precautions in place. Continue to monitor daily CBC with differential. Oncology following.   Squamous cell carcinoma of anal canal: Cancers staged on 4/5/2024, clinical stage IIa.  Patient has been receiving chemotherapy, OP mitomycin and fluorouracil D1-4.  Patient also receiving radiation.  Last radiation session was scheduled for next week.  She follows with [facility] cancer center. Oncology following.   Lactic acidosis: Resolved. Initial lactic 2.1. Repeat 1.3  Primary hypertension: Continue Norvasc 5 mg oral daily.  History of breast cancer in 2007: Noted.        Expected discharge date:  TBD    Disposition:    [x] Home       [] TCU       [] Rehab       [] Psych       [] SNF       [] Long Term Care Facility       [] Other-    Chief Complaint: Diarrhea, chills    Hospital Course:  Per initial HPI \"Patient is referred to the emergency department from outpatient oncology infusion center where she had presented for her chemotherapy treatment.  Patient reported nausea and vomiting and  diarrhea.  She has had chills.  Reports her urine is very dark almost the color of blood.  Patient did not receive her chemo treatment and was instead referred to the emergency department for further evaluation of illness.     In the ED the patient is found to have enteritis along with lactic acidosis and tachycardia.  Patient is started on empiric Zosyn and hydrated.  Patient will be admitted to med telemetry for further evaluation and treatment.\"     Subjective (past 24 hours): Patient seen and examined this morning. Daughter is at bedside. Patient reports she is feeling \"blah\" today. She had one episode of diarrhea in last 24 hours that was watery. Platelets 21 today. Oncology to see patient tomorrow. Patient denies having any chest pain, abdominal pain, nausea, vomiting, fever, or chills.       ROS (12 point review of systems completed.  Pertinent positives noted. Otherwise ROS is negative).    Medications:  Reviewed    Infusion Medications    sodium chloride 50 mL/hr at 07/14/24 1444     Scheduled Medications    sodium phosphate 15 mmol in sodium chloride 0.9 % 250 mL IVPB  15 mmol IntraVENous Once    amLODIPine  5 mg Oral Nightly    calcium elemental  1 tablet Oral BID    sodium chloride flush  5-40 mL IntraVENous 2 times per day    [Held by provider] enoxaparin  40 mg SubCUTAneous Daily    piperacillin-tazobactam  3,375 mg IntraVENous Q8H     PRN Meds: sodium phosphate 15 mmol in sodium chloride 0.9 % 250 mL IVPB, calcium gluconate, prochlorperazine, sodium chloride flush, sodium chloride, potassium chloride **OR** potassium alternative oral replacement **OR** potassium chloride, magnesium sulfate, ondansetron **OR** ondansetron, polyethylene glycol, acetaminophen **OR** acetaminophen      Intake/Output Summary (Last 24 hours) at 7/14/2024 1631  Last data filed at 7/14/2024 1800  Gross per 24 hour   Intake 2093.23 ml   Output 500 ml   Net 1593.23 ml       Diet:  ADULT ORAL NUTRITION SUPPLEMENT; Lunch, Dinner;  72 hours.    Microbiology:      Urinalysis:      Lab Results   Component Value Date/Time    NITRU NEGATIVE 07/12/2024 03:00 PM    WBCUA 5-9 07/12/2024 03:00 PM    BACTERIA FEW 07/12/2024 03:00 PM    RBCUA 3-5 07/12/2024 03:00 PM    BLOODU SMALL 07/12/2024 03:00 PM    GLUCOSEU NEGATIVE 07/12/2024 03:00 PM       Radiology:  CT ABDOMEN PELVIS W IV CONTRAST Additional Contrast? None   Final Result   1. Moderate mucosal thickening is noted in the mid and distal small bowel loops   as well as diffuse colonic wall thickening suggesting enterocolitis. A small   amount of ascites is noted in the dependent portion of the pelvis. No fluid   collection or free air is observed. The small bowel loops in the lower   abdomen/pelvis are dilated measuring up to 3 cm in diameter and contain feculent   material suggesting stasis. No transition point is identified to suggest a   mechanical bowel obstruction.      2. Chronic findings are discussed.            **This report has been created using voice recognition software.  It may contain   minor errors which are inherent in voice recognition technology.**      Electronically signed by Dr. Caren Cadet      XR CHEST PORTABLE   Final Result   There is no acute intrathoracic process.               **This report has been created using voice recognition software. It may contain   minor errors which are inherent in voice recognition technology.**      Electronically signed by Dr Maykel Rod          DVT prophylaxis: [] Lovenox                                 [x] SCDs                                 [] SQ Heparin                                 [] Encourage ambulation           [] Already on Anticoagulation     Code Status: Full Code    PT/OT Eval Status: N/A    Tele:   [x] yes             [] no    Electronically signed by Andreia Crews DO on 7/14/2024 at 11:37 PM

## 2024-07-14 NOTE — PROCEDURES
PROCEDURE NOTE  Date: 7/14/2024   Name: Tiffany CURRY Nicolasa  YOB: 1950    Procedures EKG completed, given to Sheyla NOLACSO

## 2024-07-14 NOTE — PROGRESS NOTES
Pharmacy Note  BioFire Result    Tiffany Baldwin is a 74 y.o. female, with a positive blood culture result    Communication received from Natividad, laboratory employee on 7/14/2024 at 9:28 AM    First Gram stain result: gram positive cocci in clusters    BioFire BCID result: No organism detected    BioFire BCID and gram stain congruent? No    Suspected source? Contaminant? (1 of 2 cultures)    Patient chart has been reviewed for signs/symptoms of infection: Yes  /72   Pulse (!) 103   Temp 97.6 °F (36.4 °C) (Oral)   Resp 16   Ht 1.524 m (5')   Wt 69.3 kg (152 lb 12.5 oz)   SpO2 95%   BMI 29.84 kg/m²   Lab Results   Component Value Date    WBC 0.5 (LL) 07/14/2024     Allergies reviewed  Ace inhibitors    Renal function reviewed  Estimated Creatinine Clearance: 109 mL/min (based on SCr of 0.4 mg/dL).    Current antibiotic regimen: piperacillin/tazobactam    Intervention needed: No    Individual contacted: GAURAV Carrion    Recommendations: Continue current therapy.     Recommendations accepted? N/a    Rossy Barahona RPH  7/14/2024 9:28 AM

## 2024-07-15 ENCOUNTER — HOSPITAL ENCOUNTER (OUTPATIENT)
Dept: RADIATION ONCOLOGY | Age: 74
End: 2024-07-15
Payer: MEDICARE

## 2024-07-15 ENCOUNTER — HOSPITAL ENCOUNTER (OUTPATIENT)
Dept: INFUSION THERAPY | Age: 74
End: 2024-07-15

## 2024-07-15 PROBLEM — D70.9 NEUTROPENIA (HCC): Status: ACTIVE | Noted: 2024-07-15

## 2024-07-15 PROBLEM — D69.6 THROMBOCYTOPENIA (HCC): Status: ACTIVE | Noted: 2024-07-15

## 2024-07-15 LAB
ANION GAP SERPL CALC-SCNC: 13 MEQ/L (ref 8–16)
BACTERIA BLD AEROBE CULT: ABNORMAL
BASOPHILS ABSOLUTE: 0 THOU/MM3 (ref 0–0.1)
BASOPHILS NFR BLD AUTO: 0 %
BUN SERPL-MCNC: 20 MG/DL (ref 7–22)
BURR CELLS BLD QL SMEAR: ABNORMAL
CALCIUM SERPL-MCNC: 7.6 MG/DL (ref 8.5–10.5)
CHLORIDE SERPL-SCNC: 109 MEQ/L (ref 98–111)
CO2 SERPL-SCNC: 21 MEQ/L (ref 23–33)
CREAT SERPL-MCNC: 0.4 MG/DL (ref 0.4–1.2)
DEPRECATED RDW RBC AUTO: 45.8 FL (ref 35–45)
EOSINOPHIL NFR BLD AUTO: 5 %
EOSINOPHILS ABSOLUTE: 0 THOU/MM3 (ref 0–0.4)
ERYTHROCYTE [DISTWIDTH] IN BLOOD BY AUTOMATED COUNT: 14 % (ref 11.5–14.5)
GFR SERPL CREATININE-BSD FRML MDRD: > 90 ML/MIN/1.73M2
GLUCOSE SERPL-MCNC: 87 MG/DL (ref 70–108)
HCT VFR BLD AUTO: 23.8 % (ref 37–47)
HGB BLD-MCNC: 8 GM/DL (ref 12–16)
LYMPHOCYTES ABSOLUTE: 0.1 THOU/MM3 (ref 1–4.8)
LYMPHOCYTES NFR BLD AUTO: 14 %
MANUAL DIFF BLD: NORMAL
MCH RBC QN AUTO: 30.3 PG (ref 26–33)
MCHC RBC AUTO-ENTMCNC: 33.6 GM/DL (ref 32.2–35.5)
MCV RBC AUTO: 90.2 FL (ref 81–99)
METAMYELOCYTES: 2 %
MONOCYTES ABSOLUTE: 0.1 THOU/MM3 (ref 0.4–1.3)
MONOCYTES NFR BLD AUTO: 10 %
NEUTROPHILS ABSOLUTE: 0.5 THOU/MM3 (ref 1.8–7.7)
NEUTROPHILS NFR BLD AUTO: 69 %
NRBC BLD AUTO-RTO: 0 /100 WBC
ORGANISM: ABNORMAL
ORGANISM: ABNORMAL
PHOSPHATE SERPL-MCNC: 2.6 MG/DL (ref 2.4–4.7)
PHOSPHATE SERPL-MCNC: 3.2 MG/DL (ref 2.4–4.7)
PLATELET # BLD AUTO: 8 THOU/MM3 (ref 130–400)
PLATELET BLD QL SMEAR: ABNORMAL
PMV BLD AUTO: 12.3 FL (ref 9.4–12.4)
POIKILOCYTES: ABNORMAL
POTASSIUM SERPL-SCNC: 3.5 MEQ/L (ref 3.5–5.2)
RBC # BLD AUTO: 2.64 MILL/MM3 (ref 4.2–5.4)
SODIUM SERPL-SCNC: 143 MEQ/L (ref 135–145)
TOXIC GRANULES BLD QL SMEAR: PRESENT
VARIANT LYMPHS BLD QL SMEAR: ABNORMAL %
WBC # BLD AUTO: 0.7 THOU/MM3 (ref 4.8–10.8)

## 2024-07-15 PROCEDURE — 6370000000 HC RX 637 (ALT 250 FOR IP): Performed by: PHYSICIAN ASSISTANT

## 2024-07-15 PROCEDURE — 84100 ASSAY OF PHOSPHORUS: CPT

## 2024-07-15 PROCEDURE — 6360000002 HC RX W HCPCS: Performed by: PHYSICIAN ASSISTANT

## 2024-07-15 PROCEDURE — 1200000000 HC SEMI PRIVATE

## 2024-07-15 PROCEDURE — 2580000003 HC RX 258: Performed by: STUDENT IN AN ORGANIZED HEALTH CARE EDUCATION/TRAINING PROGRAM

## 2024-07-15 PROCEDURE — 36430 TRANSFUSION BLD/BLD COMPNT: CPT

## 2024-07-15 PROCEDURE — 1200000003 HC TELEMETRY R&B

## 2024-07-15 PROCEDURE — 99222 1ST HOSP IP/OBS MODERATE 55: CPT | Performed by: NURSE PRACTITIONER

## 2024-07-15 PROCEDURE — 85025 COMPLETE CBC W/AUTO DIFF WBC: CPT

## 2024-07-15 PROCEDURE — 2500000003 HC RX 250 WO HCPCS: Performed by: STUDENT IN AN ORGANIZED HEALTH CARE EDUCATION/TRAINING PROGRAM

## 2024-07-15 PROCEDURE — 6370000000 HC RX 637 (ALT 250 FOR IP): Performed by: STUDENT IN AN ORGANIZED HEALTH CARE EDUCATION/TRAINING PROGRAM

## 2024-07-15 PROCEDURE — 99232 SBSQ HOSP IP/OBS MODERATE 35: CPT | Performed by: NURSE PRACTITIONER

## 2024-07-15 PROCEDURE — 2580000003 HC RX 258: Performed by: PHYSICIAN ASSISTANT

## 2024-07-15 PROCEDURE — P9037 PLATE PHERES LEUKOREDU IRRAD: HCPCS

## 2024-07-15 PROCEDURE — 80048 BASIC METABOLIC PNL TOTAL CA: CPT

## 2024-07-15 RX ORDER — TRIAMCINOLONE ACETONIDE 1 MG/G
CREAM TOPICAL 2 TIMES DAILY
Status: DISCONTINUED | OUTPATIENT
Start: 2024-07-16 | End: 2024-07-16 | Stop reason: SDUPTHER

## 2024-07-15 RX ORDER — SODIUM CHLORIDE 9 MG/ML
INJECTION, SOLUTION INTRAVENOUS PRN
Status: DISCONTINUED | OUTPATIENT
Start: 2024-07-15 | End: 2024-07-25 | Stop reason: HOSPADM

## 2024-07-15 RX ADMIN — CALCIUM 500 MG: 500 TABLET ORAL at 08:11

## 2024-07-15 RX ADMIN — AMLODIPINE BESYLATE 5 MG: 5 TABLET ORAL at 23:09

## 2024-07-15 RX ADMIN — CALCIUM 500 MG: 500 TABLET ORAL at 23:08

## 2024-07-15 RX ADMIN — SODIUM PHOSPHATE, MONOBASIC, MONOHYDRATE AND SODIUM PHOSPHATE, DIBASIC, ANHYDROUS 15 MMOL: 142; 276 INJECTION, SOLUTION INTRAVENOUS at 04:06

## 2024-07-15 RX ADMIN — PIPERACILLIN AND TAZOBACTAM 3375 MG: 3; .375 INJECTION, POWDER, FOR SOLUTION INTRAVENOUS at 01:02

## 2024-07-15 RX ADMIN — PIPERACILLIN AND TAZOBACTAM 3375 MG: 3; .375 INJECTION, POWDER, FOR SOLUTION INTRAVENOUS at 08:54

## 2024-07-15 NOTE — PLAN OF CARE
Problem: Discharge Planning  Goal: Discharge to home or other facility with appropriate resources  Outcome: Progressing  Flowsheets (Taken 7/14/2024 2039)  Discharge to home or other facility with appropriate resources:   Identify barriers to discharge with patient and caregiver   Arrange for needed discharge resources and transportation as appropriate   Identify discharge learning needs (meds, wound care, etc)   Refer to discharge planning if patient needs post-hospital services based on physician order or complex needs related to functional status, cognitive ability or social support system     Problem: Safety - Adult  Goal: Free from fall injury  Outcome: Progressing     Problem: Skin/Tissue Integrity  Goal: Absence of new skin breakdown  Description: 1.  Monitor for areas of redness and/or skin breakdown  2.  Assess vascular access sites hourly  3.  Every 4-6 hours minimum:  Change oxygen saturation probe site  4.  Every 4-6 hours:  If on nasal continuous positive airway pressure, respiratory therapy assess nares and determine need for appliance change or resting period.  Outcome: Progressing     Problem: Musculoskeletal - Adult  Goal: Return ADL status to a safe level of function  Outcome: Progressing  Flowsheets (Taken 7/14/2024 2039)  Return ADL Status to a Safe Level of Function:   Administer medication as ordered   Assess activities of daily living deficits and provide assistive devices as needed   Obtain physical therapy/occupational therapy consults as needed   Assist and instruct patient to increase activity and self care as tolerated     Problem: Gastrointestinal - Adult  Goal: Maintains or returns to baseline bowel function  Outcome: Progressing  Flowsheets (Taken 7/14/2024 2039)  Maintains or returns to baseline bowel function:   Assess bowel function   Encourage oral fluids to ensure adequate hydration   Administer ordered medications as needed   Encourage mobilization and activity   Nutrition

## 2024-07-15 NOTE — CARE COORDINATION
07/15/24 0922   Service Assessment   Patient Orientation Alert and Oriented;Person;Place;Situation;Self   Cognition Alert   History Provided By Patient;Medical Record   Primary Caregiver Self   Accompanied By/Relationship Unaccompanied   Support Systems Children;Family Members;Friends/Neighbors;[employer]ism/[place] [employer]   Patient's Healthcare Decision Maker is: Named in Scanned ACP Document   PCP Verified by CM Yes   Last Visit to PCP Within last 6 months   Prior Functional Level Independent in ADLs/IADLs;Bathing;Dressing;Toileting;Feeding;Cooking;Housework;Shopping;Mobility   Current Functional Level Assistance with the following:;Shopping;Housework   Can patient return to prior living arrangement Yes   Ability to make needs known: Good   Family able to assist with home care needs: Yes   Would you like for me to discuss the discharge plan with any other family members/significant others, and if so, who? No   Financial Resources Medicare;Other (Comment)  (AET[employer] as secondary)   Community Resources None   CM/SW Referral ADLs/IADLs   Social/Functional History   Active  Yes   Occupation Retired   Discharge Planning   Type of Residence House   Living Arrangements Alone   Current Services Prior To Admission None   Potential Assistance Needed N/A   DME Ordered? No   Potential Assistance Purchasing Medications No   Type of Home Care Services None   Patient expects to be discharged to: House   History of falls? 0   Services At/After Discharge   Transition of Care Consult (CM Consult) N/A   Services At/After Discharge None   Confirm Follow Up Transport Family   Condition of Participation: Discharge Planning   The Plan for Transition of Care is related to the following treatment goals: get stronger   Freedom of Choice list was provided with basic dialogue that supports the patient's individualized plan of care/goals, treatment preferences, and shares the quality data associated with the providers?  No         Case  Management Assessment Initial Evaluation     Date/Time of Evaluation: 7/15/2024 7:09 AM  Assessment Completed by: Zaynab Menchaca RN     If patient is discharged prior to next notation, then this note serves as note for discharge by case management.     Patient Name: Tiffany Baldwin                   YOB: 1950  Diagnosis: Enteritis [K52.9]                   Date / Time: 2024 11:40 AM  [facility]      Patient Admission Status: Inpatient   Readmission Risk Low 0-14, Mod 15-19), High > 20: Readmission Risk Score: 19.3     Current PCP: Pooja Herrera APRN - CNP     Additional Case Management Notes: Patient presents with nausea, vomiting, fatigue, diarrhea, and weakness. Patient has a history of squamous cell rectal cancer. She is on chemotherapy and radiation. She [facility] with [facility]. WBC 0.7, H/H 8.0/23.8, platelets 8, blood cultures pending. Transfuse 2 units of platelets, Oncology consult, Lovenox on hold, Zosyn, prn Tylenol, Compazine, and Zofran, electrolyte replacement protocol, daily BMP and CBC, oxygen, SCD's, PT/OT, up with assistance.       Procedures: N/A     Imagin/12 CT of abdomen:  IMPRESSION:  1. Moderate mucosal thickening is noted in the mid and distal small bowel loops  as well as diffuse colonic wall thickening suggesting enterocolitis. A small  amount of ascites is noted in the dependent portion of the pelvis. No fluid  collection or free air is observed. The small bowel loops in the lower  abdomen/pelvis are dilated measuring up to 3 cm in diameter and contain feculent  material suggesting stasis. No transition point is identified to suggest a  mechanical bowel obstruction.     2. Chronic findings are discussed.     Patient Goals/Plan/Treatment Preferences: Tiffany resides at home alone. She is able to afford her medications. She is not aware of any DME she may need. She is open to home health if needed when she is ready for discharge. Tiffany

## 2024-07-15 NOTE — PLAN OF CARE
Problem: Discharge Planning  Goal: Discharge to home or other facility with appropriate resources  7/15/2024 0957 by Sari Jeffries RN  Outcome: Progressing  7/15/2024 0442 by Nilay Estrada RN  Outcome: Progressing  Flowsheets (Taken 7/14/2024 2039)  Discharge to home or other facility with appropriate resources:   Identify barriers to discharge with patient and caregiver   Arrange for needed discharge resources and transportation as appropriate   Identify discharge learning needs (meds, wound care, etc)   Refer to discharge planning if patient needs post-hospital services based on physician order or complex needs related to functional status, cognitive ability or social support system   Possible Discharge home.     Problem: Safety - Adult  Goal: Free from fall injury  7/15/2024 0957 by Sari Jeffries RN  Outcome: Progressing  7/15/2024 0442 by Nilay Estrada RN  Outcome: Progressing   Patient will use call light appropriately for assistance. Patient belongings are within reach.     Problem: Skin/Tissue Integrity  Goal: Absence of new skin breakdown  Description: 1.  Monitor for areas of redness and/or skin breakdown  2.  Assess vascular access sites hourly  3.  Every 4-6 hours minimum:  Change oxygen saturation probe site  4.  Every 4-6 hours:  If on nasal continuous positive airway pressure, respiratory therapy assess nares and determine need for appliance change or resting period.  7/15/2024 0957 by Sari Jeffries RN  Outcome: Progressing  7/15/2024 0442 by Nilay Estrada RN  Outcome: Progressing   Patient will not have new skin breakdown this shift. Patient will turn every 2 hours and PRN.     Problem: Musculoskeletal - Adult  Goal: Return ADL status to a safe level of function  7/15/2024 0957 by Sari Jeffries RN  Outcome: Progressing  7/15/2024 0442 by Nilay Estrada RN  Outcome: Progressing  Flowsheets (Taken 7/14/2024 2039)  Return ADL Status to a Safe Level of Function:

## 2024-07-15 NOTE — PROGRESS NOTES
Comprehensive Nutrition Assessment    Type and Reason for Visit:  Initial, Positive Nutrition Screen, Wound    Nutrition Recommendations/Plan:   Continue diet per provider and as GI function allows  Modified ONS: Ensure clear (TID)  Recommend to continue nausea medications as needed; Recommend addition of Magic Mouth wash if mouth sores do not continue to heal; To consider addition of appetite stimulant if pt continues with lack of desire to eat.      Malnutrition Assessment:  Malnutrition Status:  Severe malnutrition (07/15/24 1141)    Context:  Acute Illness     Findings of the 6 clinical characteristics of malnutrition:  Energy Intake:  50% or less of estimated energy requirements for 5 or more days (Endorses very poor PO intake for 3 weeks)  Weight Loss:  Unable to assess (edema and fluctuations in EMR)     Body Fat Loss:  Mild body fat loss Orbital   Muscle Mass Loss:  Moderate muscle mass loss Temples (temporalis), Scapula (trapezius), Clavicles (pectoralis & deltoids)  Fluid Accumulation:  Mild Extremities   Strength:  Not Performed    Nutrition Assessment:     Pt. severely malnourished AEB criteria listed above.  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       Nutrition Related Findings:    Pt. Report/Treatments/Miscellaneous: Pt seen, heavily resting upon visit but awoke to name. Pt reports not eating more than a couple of bites for the last ~3 weeks r/t N/V and diarrhea, mouth sores (cannot afford magic mouth wash but have improved with salt water rinses), as well as no appetite/nothing sounding appealing. She reports not being able to keep anything down and that her medications (imodium & compazine) were not helping as she felt they should. Pt feels very weak  assessment, PO intake 50% or greater       Nutrition Monitoring and Evaluation:      Food/Nutrient Intake Outcomes: Diet Advancement/Tolerance, Supplement Intake, Food and Nutrient Intake  Physical Signs/Symptoms Outcomes: Biochemical Data, GI Status, Nausea or Vomiting, Diarrhea, Fluid Status or Edema, Nutrition Focused Physical Findings, Skin, Weight    Discharge Planning:    Too soon to determine     Vu Munguia RD, LD  Contact: [phone]

## 2024-07-15 NOTE — PROGRESS NOTES
[facility]. They are still planning on taking patient today. They are setting up transport with [facility], then will notify this RN of time.

## 2024-07-15 NOTE — CONSULTS
[facility]    Patient - Tiffany Baldwin   MRN -  [id number]   [facility] [id number]   - 1950      Date of Admission -  2024 11:40 AM  Date of evaluation -  7/15/2024  Room - [facility]006-A   Hospital Day - 3  Consulting - Joana Galvez APRN - CNP Primary Care Physician - Pooja Herrera APRN - CNP     Inpatient consult to Oncology  Consult performed by: Ana Ariza APRN - CNP  Consult ordered by: Nubia Hart PA-C           Reason for Consult    Current patient in treatment, enteritis, and lactic acidosis  Active Hospital Problem List      Active Hospital Problems    Diagnosis Date Noted    Enteritis [K52.9] 2024     HPI   Tiffany Baldwin is a 74 y.o. female admitted for nausea/vomiting and diarrhea on 24. She had presented to oncology for fluid bolus after experiencing nausea/vomiting and diarrhea. Per the patient, the night prior she had difficulty returning to bed after getting up to urinate. Then later on that night, she had an episode of bowel incontinence and was unable to clean up herself or her bed. She reports progressive weakness. Upon ER visit labs were done. Labs (2024): WBC: 0.2 RBC: 3.57 Hgb: 10.8 Hct: 31.6 MCV: 88.5 Platelet: 13 Sodium: 133 Potassium: 3.6 Chloride: 102 BUN: 17 Creat: 12 Calcium: 8.1 Lactic Acid 2.1. She received platelets on 2024. She was then admitted to the hospital with our service consulted.    07/15/2024: The patient is resting in bed, no family present. The patient reports she is feeling better today. She feels unsafe by herself at home and reports she has had several episodes of not being able to get up or help herself. She is willing to discuss possible assisted living/nursing home. Case management consulted.     Oncology History          Oncology History   Squamous cell carcinoma of anal canal (HCC)   2024 -  Cancer Staged     Staging form: Anus, AJCC V9  - Clinical stage from 2024: Stage  daily  Normocytic anemia  Likely reactive to thrombocytopenia and sepsis  H/H 8.0/23.8. Continue to trend  Daily CBC  Transfuse PRBC for Hgb <7.      \"Thank you for asking us to see this interesting patient\"    Case discussed with nurse and patient/family.  Questions and concerns addressed.  Plan made in collaboration with Dr. Braun    Electronically signed by   RUT Cruz CNP on 7/15/2024 at 11:10 AM    NOTE:  This report was transcribed using voice recognition software. Every effort  was made to ensure accuracy; however, inadvertent computerized transcription  errors may be present.

## 2024-07-15 NOTE — PROGRESS NOTES
Hospitalist Progress Note    [facility]Bed[facility]-A    YOB: 1950    MRN: [id number]       Acct: [id number]     PCP: Pooja Herrera APRN - CNP    Date of Admission: 7/12/2024    Assessment/Plan:  Enteritis,  due to Enteritis,Enteropathogenic E.Coli    CT abdomen/ pelvis with IV contrast  done 7/12/24 showed: Moderate mucosal thickening is noted in the mid and distal small bowel loops  as well as diffuse colonic wall thickening suggesting enterocolitis  Patient on IV Zosyn  Patient reports abdominal pain and diarrhea improving, will defer GI consult for now  Continue to monitor  Continue supportive care    2.Acute neutropenia  Level today at 0.7  Continue neutropenic precaution  Oncology on consult  Monitor CBC      3. Thrombocytopenia: Per record patient is S/p 2 units of irradiated platelets on 7/13/24. Platelet level  7/14 was 21   Today platelet level at 8 no bleeding noted or reported  Ordered 4 units of platelet for transfusion   Per record consult recommends transfusing if platelets below 20,000.    Continue mechanical DVT prophylaxis, avoid chemical DVT prophylaxis due   Anticipated Discharge in : TBD      4.Primary Hypertension  Continue Amlodipine with parameters    5.History of breast cancer    6.Squamous cell carcinoma of anal canal:  Patient follow with  [facility]ology outpatient    7.Lactic acidosis now resolved        Active Hospital Problems    Diagnosis Date Noted    Severe malnutrition (HCC) [E43] 07/15/2024    Enteritis [K52.9] 07/12/2024           Chief Complaint: Diarrhea, chills     Hospital Course: initial HPI on 7/12/24  Patient is referred to the emergency department from outpatient oncology infusion center where she had presented for her chemotherapy treatment.  Patient reported nausea and vomiting and diarrhea.  She has had chills.  Reports her urine is very dark almost the color of blood.  Patient did not receive her chemo  Clear Liquid Oral Supplement    Exam:  /78   Pulse (!) 104   Temp 98.2 °F (36.8 °C) (Oral)   Resp 18   Ht 1.524 m (5')   Wt 69.3 kg (152 lb 12.5 oz)   SpO2 94%   BMI 29.84 kg/m²     General appearance: Awake alert pleasant No apparent distress, appears stated age and cooperative.  HEENT: Pupils equal, round, and reactive to light. Conjunctivae/corneas clear.  Neck: Supple, with full range of motion. No jugular venous distention. Trachea midline.  Respiratory:  Normal respiratory effort. Clear to auscultation, bilaterally without Rales/Wheezes/Rhonchi.  Cardiovascular: Regular rate and rhythm with normal S1/S2 without murmurs, rubs or gallops.  Abdomen: Soft, mild generalized tendernes non-distended with normal bowel sounds.  Musculoskeletal: passive and active ROM x 4 extremities.  Skin: Skin color, texture, turgor normal.  No rashes or lesions.  Neurologic:  Neurovascularly intact without any focal sensory/motor deficits.  Psychiatric: Alert and oriented, thought content appropriate, normal insight  Capillary Refill: Brisk,< 3 seconds   Peripheral Pulses: +2 palpable, equal bilaterally       Labs:   Recent Labs     07/13/24  1713 07/14/24  0522 07/15/24  0620   WBC 0.3* 0.5* 0.7*   HGB 7.6* 8.8* 8.0*   HCT 22.6* 25.9* 23.8*   PLT 28* 21* 8*     Recent Labs     07/13/24  0630 07/14/24  0630 07/14/24  0700 07/14/24  1615 07/15/24  0140 07/15/24  0620 07/15/24  0903    140  --   --   --  143  --    K 3.5 3.0*  --  3.9  --  3.5  --     108  --   --   --  109  --    CO2 20* 24  --   --   --  21*  --    BUN 19 22  --   --   --  20  --    CREATININE 0.5 0.4  --   --   --  0.4  --    CALCIUM 7.7* 7.9*  --   --   --  7.6*  --    PHOS  --   --    < > 2.5 2.6  --  3.2    < > = values in this interval not displayed.     Recent Labs     07/14/24  0700   AST 27   ALT 21   BILIDIR 2.0   BILITOT 2.8*   ALKPHOS 53     No results for input(s): \"INR\" in the last 72 hours.  No results for input(s): \"CKTOTAL\",

## 2024-07-16 ENCOUNTER — HOSPITAL ENCOUNTER (OUTPATIENT)
Dept: RADIATION ONCOLOGY | Age: 74
Discharge: HOME | End: 2024-07-16
Payer: MEDICARE

## 2024-07-16 LAB
ABO: NORMAL
ANION GAP SERPL CALC-SCNC: 9 MEQ/L (ref 8–16)
ANTIBODY SCREEN: NORMAL
BASOPHILS ABSOLUTE: 0 THOU/MM3 (ref 0–0.1)
BASOPHILS NFR BLD AUTO: 0 %
BUN SERPL-MCNC: 13 MG/DL (ref 7–22)
CALCIUM SERPL-MCNC: 7.6 MG/DL (ref 8.5–10.5)
CHLORIDE SERPL-SCNC: 106 MEQ/L (ref 98–111)
CO2 SERPL-SCNC: 25 MEQ/L (ref 23–33)
CREAT SERPL-MCNC: 0.4 MG/DL (ref 0.4–1.2)
DEPRECATED RDW RBC AUTO: 45.7 FL (ref 35–45)
DEPRECATED RDW RBC AUTO: 46.5 FL (ref 35–45)
EOSINOPHIL NFR BLD AUTO: 1.6 %
EOSINOPHILS ABSOLUTE: 0 THOU/MM3 (ref 0–0.4)
ERYTHROCYTE [DISTWIDTH] IN BLOOD BY AUTOMATED COUNT: 14.3 % (ref 11.5–14.5)
ERYTHROCYTE [DISTWIDTH] IN BLOOD BY AUTOMATED COUNT: 14.3 % (ref 11.5–14.5)
GFR SERPL CREATININE-BSD FRML MDRD: > 90 ML/MIN/1.73M2
GLUCOSE SERPL-MCNC: 96 MG/DL (ref 70–108)
HCT VFR BLD AUTO: 20.3 % (ref 37–47)
HCT VFR BLD AUTO: 20.7 % (ref 37–47)
HCT VFR BLD AUTO: 22.1 % (ref 37–47)
HGB BLD-MCNC: 6.9 GM/DL (ref 12–16)
HGB BLD-MCNC: 7.1 GM/DL (ref 12–16)
HGB BLD-MCNC: 7.5 GM/DL (ref 12–16)
IMM GRANULOCYTES # BLD AUTO: 0.01 THOU/MM3 (ref 0–0.07)
IMM GRANULOCYTES NFR BLD AUTO: 1.6 %
LYMPHOCYTES ABSOLUTE: 0 THOU/MM3 (ref 1–4.8)
LYMPHOCYTES NFR BLD AUTO: 6.3 %
MCH RBC QN AUTO: 30 PG (ref 26–33)
MCH RBC QN AUTO: 31.6 PG (ref 26–33)
MCHC RBC AUTO-ENTMCNC: 33.3 GM/DL (ref 32.2–35.5)
MCHC RBC AUTO-ENTMCNC: 35 GM/DL (ref 32.2–35.5)
MCV RBC AUTO: 90 FL (ref 81–99)
MCV RBC AUTO: 90.2 FL (ref 81–99)
MONOCYTES ABSOLUTE: 0.1 THOU/MM3 (ref 0.4–1.3)
MONOCYTES NFR BLD AUTO: 20.3 %
NEUTROPHILS ABSOLUTE: 0.4 THOU/MM3 (ref 1.8–7.7)
NEUTROPHILS NFR BLD AUTO: 70.2 %
NRBC BLD AUTO-RTO: 0 /100 WBC
PLATELET # BLD AUTO: 74 THOU/MM3 (ref 130–400)
PLATELET # BLD AUTO: 83 THOU/MM3 (ref 130–400)
PLATELET BLD QL SMEAR: ABNORMAL
PMV BLD AUTO: 9.6 FL (ref 9.4–12.4)
PMV BLD AUTO: 9.8 FL (ref 9.4–12.4)
POTASSIUM SERPL-SCNC: 3 MEQ/L (ref 3.5–5.2)
POTASSIUM SERPL-SCNC: 3.8 MEQ/L (ref 3.5–5.2)
RBC # BLD AUTO: 2.25 MILL/MM3 (ref 4.2–5.4)
RBC # BLD AUTO: 2.3 MILL/MM3 (ref 4.2–5.4)
RH FACTOR: NORMAL
SCAN OF BLOOD SMEAR: NORMAL
SCAN OF BLOOD SMEAR: NORMAL
SODIUM SERPL-SCNC: 140 MEQ/L (ref 135–145)
WBC # BLD AUTO: 0.6 THOU/MM3 (ref 4.8–10.8)
WBC # BLD AUTO: 0.7 THOU/MM3 (ref 4.8–10.8)

## 2024-07-16 PROCEDURE — 85018 HEMOGLOBIN: CPT

## 2024-07-16 PROCEDURE — 85014 HEMATOCRIT: CPT

## 2024-07-16 PROCEDURE — 99232 SBSQ HOSP IP/OBS MODERATE 35: CPT | Performed by: NURSE PRACTITIONER

## 2024-07-16 PROCEDURE — 97166 OT EVAL MOD COMPLEX 45 MIN: CPT

## 2024-07-16 PROCEDURE — 85027 COMPLETE CBC AUTOMATED: CPT

## 2024-07-16 PROCEDURE — 86923 COMPATIBILITY TEST ELECTRIC: CPT

## 2024-07-16 PROCEDURE — 84132 ASSAY OF SERUM POTASSIUM: CPT

## 2024-07-16 PROCEDURE — 97162 PT EVAL MOD COMPLEX 30 MIN: CPT

## 2024-07-16 PROCEDURE — 97535 SELF CARE MNGMENT TRAINING: CPT

## 2024-07-16 PROCEDURE — 6360000002 HC RX W HCPCS: Performed by: PHYSICIAN ASSISTANT

## 2024-07-16 PROCEDURE — 86850 RBC ANTIBODY SCREEN: CPT

## 2024-07-16 PROCEDURE — 6370000000 HC RX 637 (ALT 250 FOR IP): Performed by: STUDENT IN AN ORGANIZED HEALTH CARE EDUCATION/TRAINING PROGRAM

## 2024-07-16 PROCEDURE — 77386 HC NTSTY MODUL RAD TX DLVR CPLX: CPT | Performed by: RADIOLOGY

## 2024-07-16 PROCEDURE — 6370000000 HC RX 637 (ALT 250 FOR IP): Performed by: NURSE PRACTITIONER

## 2024-07-16 PROCEDURE — 85025 COMPLETE CBC W/AUTO DIFF WBC: CPT

## 2024-07-16 PROCEDURE — 2580000003 HC RX 258: Performed by: PHYSICIAN ASSISTANT

## 2024-07-16 PROCEDURE — 6370000000 HC RX 637 (ALT 250 FOR IP): Performed by: PHYSICIAN ASSISTANT

## 2024-07-16 PROCEDURE — 86900 BLOOD TYPING SEROLOGIC ABO: CPT

## 2024-07-16 PROCEDURE — 1200000000 HC SEMI PRIVATE

## 2024-07-16 PROCEDURE — 80048 BASIC METABOLIC PNL TOTAL CA: CPT

## 2024-07-16 PROCEDURE — 77014 CHG CT GUIDANCE RADIATION THERAPY FLDS PLACEMENT: CPT | Performed by: RADIOLOGY

## 2024-07-16 PROCEDURE — 97530 THERAPEUTIC ACTIVITIES: CPT

## 2024-07-16 PROCEDURE — 86901 BLOOD TYPING SEROLOGIC RH(D): CPT

## 2024-07-16 PROCEDURE — 1200000003 HC TELEMETRY R&B

## 2024-07-16 RX ORDER — POTASSIUM CHLORIDE 29.8 MG/ML
20 INJECTION INTRAVENOUS PRN
Status: DISCONTINUED | OUTPATIENT
Start: 2024-07-16 | End: 2024-07-25 | Stop reason: HOSPADM

## 2024-07-16 RX ORDER — POTASSIUM CHLORIDE 7.45 MG/ML
10 INJECTION INTRAVENOUS PRN
Status: DISCONTINUED | OUTPATIENT
Start: 2024-07-16 | End: 2024-07-25 | Stop reason: HOSPADM

## 2024-07-16 RX ORDER — CALCIUM CARBONATE 500 MG/1
500 TABLET, CHEWABLE ORAL 3 TIMES DAILY PRN
Status: DISCONTINUED | OUTPATIENT
Start: 2024-07-16 | End: 2024-07-25 | Stop reason: HOSPADM

## 2024-07-16 RX ORDER — SODIUM CHLORIDE 9 MG/ML
INJECTION, SOLUTION INTRAVENOUS PRN
Status: DISCONTINUED | OUTPATIENT
Start: 2024-07-16 | End: 2024-07-25 | Stop reason: HOSPADM

## 2024-07-16 RX ADMIN — ONDANSETRON 4 MG: 2 INJECTION INTRAMUSCULAR; INTRAVENOUS at 21:00

## 2024-07-16 RX ADMIN — PIPERACILLIN AND TAZOBACTAM 3375 MG: 3; .375 INJECTION, POWDER, FOR SOLUTION INTRAVENOUS at 01:01

## 2024-07-16 RX ADMIN — CALCIUM 500 MG: 500 TABLET ORAL at 08:21

## 2024-07-16 RX ADMIN — CALCIUM 500 MG: 500 TABLET ORAL at 21:22

## 2024-07-16 RX ADMIN — PIPERACILLIN AND TAZOBACTAM 3375 MG: 3; .375 INJECTION, POWDER, FOR SOLUTION INTRAVENOUS at 08:20

## 2024-07-16 RX ADMIN — AMLODIPINE BESYLATE 5 MG: 5 TABLET ORAL at 21:22

## 2024-07-16 RX ADMIN — POTASSIUM CHLORIDE 20 MEQ: 29.8 INJECTION, SOLUTION INTRAVENOUS at 09:48

## 2024-07-16 RX ADMIN — POTASSIUM CHLORIDE 20 MEQ: 29.8 INJECTION, SOLUTION INTRAVENOUS at 08:45

## 2024-07-16 RX ADMIN — Medication: at 21:25

## 2024-07-16 RX ADMIN — ONDANSETRON 4 MG: 2 INJECTION INTRAMUSCULAR; INTRAVENOUS at 01:07

## 2024-07-16 RX ADMIN — ACETAMINOPHEN 650 MG: 325 TABLET ORAL at 01:06

## 2024-07-16 RX ADMIN — ANTACID TABLETS 500 MG: 500 TABLET, CHEWABLE ORAL at 23:06

## 2024-07-16 RX ADMIN — SODIUM CHLORIDE, PRESERVATIVE FREE 10 ML: 5 INJECTION INTRAVENOUS at 21:24

## 2024-07-16 RX ADMIN — PIPERACILLIN AND TAZOBACTAM 3375 MG: 3; .375 INJECTION, POWDER, FOR SOLUTION INTRAVENOUS at 15:54

## 2024-07-16 RX ADMIN — PROCHLORPERAZINE MALEATE 10 MG: 10 TABLET ORAL at 21:22

## 2024-07-16 RX ADMIN — POTASSIUM CHLORIDE 10 MEQ: 7.46 INJECTION, SOLUTION INTRAVENOUS at 07:58

## 2024-07-16 RX ADMIN — TRIAMCINOLONE ACETONIDE: 1 CREAM TOPICAL at 08:21

## 2024-07-16 RX ADMIN — POTASSIUM CHLORIDE 10 MEQ: 7.46 INJECTION, SOLUTION INTRAVENOUS at 06:52

## 2024-07-16 ASSESSMENT — PAIN DESCRIPTION - ORIENTATION: ORIENTATION: ANTERIOR

## 2024-07-16 ASSESSMENT — PAIN DESCRIPTION - LOCATION: LOCATION: HEAD

## 2024-07-16 ASSESSMENT — PAIN DESCRIPTION - DESCRIPTORS: DESCRIPTORS: ACHING

## 2024-07-16 ASSESSMENT — PAIN SCALES - GENERAL: PAINLEVEL_OUTOF10: 3

## 2024-07-16 ASSESSMENT — PAIN - FUNCTIONAL ASSESSMENT: PAIN_FUNCTIONAL_ASSESSMENT: ACTIVITIES ARE NOT PREVENTED

## 2024-07-16 NOTE — PLAN OF CARE
Problem: Discharge Planning  Goal: Discharge to home or other facility with appropriate resources  7/16/2024 1157 by Kathryn Ureña RN  Outcome: Progressing  Flowsheets  Taken 7/16/2024 1157  Discharge to home or other facility with appropriate resources:   Identify barriers to discharge with patient and caregiver   Identify discharge learning needs (meds, wound care, etc)  Taken 7/16/2024 0818  Discharge to home or other facility with appropriate resources: Identify barriers to discharge with patient and caregiver     Problem: Safety - Adult  Goal: Free from fall injury  Outcome: Progressing  Flowsheets (Taken 7/16/2024 1157)  Free From Fall Injury:   Instruct family/caregiver on patient safety   Based on caregiver fall risk screen, instruct family/caregiver to ask for assistance with transferring infant if caregiver noted to have fall risk factors     Problem: Skin/Tissue Integrity  Goal: Absence of new skin breakdown  Description: 1.  Monitor for areas of redness and/or skin breakdown  2.  Assess vascular access sites hourly  3.  Every 4-6 hours minimum:  Change oxygen saturation probe site  4.  Every 4-6 hours:  If on nasal continuous positive airway pressure, respiratory therapy assess nares and determine need for appliance change or resting period.  Outcome: Progressing  Note: Skin assessment completed.  Patient turned every 2 hours and as needed.  No skin breakdown this shift.         Problem: Musculoskeletal - Adult  Goal: Return ADL status to a safe level of function  Outcome: Progressing  Flowsheets  Taken 7/16/2024 1157  Return ADL Status to a Safe Level of Function:   Administer medication as ordered   Assess activities of daily living deficits and provide assistive devices as needed  Taken 7/16/2024 0818  Return ADL Status to a Safe Level of Function: Administer medication as ordered     Problem: Gastrointestinal - Adult  Goal: Maintains or returns to baseline bowel function  Outcome:

## 2024-07-16 NOTE — FLOWSHEET NOTE
07/16/24 0638   Treatment Team Notification   Reason for Communication Review case  (Ma'am, this patient's latest hgb is 7.1. Do I still have to transfuse the blood?)   Name of Team Member Notified Isidro Peraza   Treatment Team Role Advanced Practice Nurse   Method of Communication Secure Message   Response Other (Comment)  (if the hemoglobin is above 7.0 hold transfusion)   Notification Time 0638

## 2024-07-16 NOTE — PROGRESS NOTES
[facility]  INPATIENT PHYSICAL THERAPY  EVALUATION  [facility] ONC MED 5K - 5K-06/006-A    Time In: 09  Time Out: 09  Timed Code Treatment Minutes: 12 Minutes  Minutes: 20          Date: 2024  Patient Name: Tiffany Baldwin,  Gender:  female        MRN: [id number]  : 1950  (74 y.o.)      Referring Practitioner: Joana Galvez APRN - CNP  Diagnosis: enteritis  Additional Pertinent Hx: Per EMR \"Patient is referred to the emergency department from outpatient oncology infusion center where she had presented for her chemotherapy treatment.  Patient reported nausea and vomiting and diarrhea.  She has had chills.  Reports her urine is very dark almost the color of blood.  Patient did not receive her chemo treatment and was instead referred to the emergency department for further evaluation of illness.\"     Restrictions/Precautions:  Restrictions/Precautions: General Precautions, Fall Risk  Position Activity Restriction  Other position/activity restrictions: neutropenic, monitor hemoglobin    Subjective:  Chart Reviewed: Yes  Patient assessed for rehabilitation services?: Yes  Family / Caregiver Present: No  Subjective: Ok to see pt per nursing. Pt in bed sleeping when PT arrived, agreeable to PT session, requesting to use the restroom and then ok to sit in chair for breakfast. Pt reporting weakness during session.    General:  Overall Orientation Status: Within Normal Limits  Orientation Level: Oriented X4  Vision: Impaired  Vision Exceptions: Wears glasses at all times  Hearing: Exceptions to WFL  Hearing Exceptions: Bilateral hearing aid       Pain: denies during session    Vitals: Vitals not assessed per clinical judgement, see nursing flowsheet, denies dizziness and lightheaded during session    Social/Functional History:    Lives With: Alone  Type of Home: House  Home Layout: One level (with basement)  Home Access: Stairs to enter with rails  Entrance Stairs - Number of Steps: 3-4  breakfast present    Functional Outcome Measures:   [facility] (6 CLICK) BASIC MOBILITY  AM-PAC Inpatient Mobility Raw Score : 17  AM-PAC Inpatient T-Scale Score : 42.13  Mobility Inpatient CMS 0-100% Score: 50.57  Mobility Inpatient CMS G-Code Modifier : CK        Modified Cornelius Scale:  Not Applicable    ASSESSMENT:  Activity Tolerance:  Patient tolerance of  treatment: good.      Treatment Initiated: Treatment and education initiated within context of evaluation.  Evaluation time included review of current medical information, gathering information related to past medical, social and functional history, completion of standardized testing, formal and informal observation of tasks, assessment of data and development of plan of care and goals.  Treatment time included skilled education and facilitation of tasks to increase safety and independence with functional mobility for improved independence and quality of life.    Assessment:  Body Structures, Functions, Activity Limitations Requiring Skilled Therapeutic Intervention: Decreased functional mobility , Decreased endurance, Increased pain, Decreased posture, Decreased balance, Decreased strength  Assessment: Tiffany Baldwin is a 74 y.o. female who presents with the deficits stated previously. Pt requires 1 person assist for functional tasks with use of walker for support. Pt cont to require skilled PT services to increase IND with functional tasks and progress towards PLOF to return to home environment safely.   Therapy Prognosis: Good    Requires PT Follow-Up: Yes    Discharge Recommendations:  Discharge Recommendations: Continue to assess pending progress, Home with  services vs SNF    Patient Education:      .    Patient Education  Education Given To: Patient  Education Provided: Role of Therapy, Plan of Care, Transfer Training, Equipment  Education Method: Verbal  Education Outcome: Demonstrated understanding, Verbalized understanding, Continued education

## 2024-07-16 NOTE — CARE COORDINATION
DISCHARGE PLANNING EVALUATION  7/16/24, 11:52 AM EDT    Reason for Referral: placement or HH  Decision Maker: self  Current Services: none  New Services Requested: SNF  Family/ Social/ Home environment: Tiffany lives at home alone. Pt reports she has a son that lives locally, he works nights and a daughter. Pt reports she has a couple grandchildren to help her if needed. Pt reports up until last Thursday she had been independent in her own cares. Pt reports she handled he own housework, gardening, driving. Pt reports her grandson did mow for her. Pt reports she was active in the community and clubs.   Payment Source:Medicare and [employer]  Transportation at Discharge: family vs ambulette  Post-acute (PAC) provider list was provided to patient. Patient was informed of their freedom to choose PAC provider. Discussed and offered to show the patient the relevant PAC Providers quality and resource use measures on Medicare Compare web site via computer based on patient's goals of care and treatment preferences. Questions regarding selection process were answered.      Teach Back Method used with Tiffany regarding care plan and needs  Patient verbalized understanding of the plan of care and contribute to goal setting.       Patient preferences and discharge plan: Patient feels at this time she could not care for herself at home if she would have another \"incident\" as she did prior to admission. SW did discuss LifeAlert button, offered list to pt, pt declines..  Pt hoping to get her strength back and continue to care for herself at home and remain active in the community. SW did discuss SNF and HH. Pt has list of SNF facilties. Pt reports on would be the one to talk with on this. Pt reports son is usually available until 8am in the morning. SW did provide pt with SW number to discuss more with son and have him call SW with any questions. SW let her know she would try to reach him by phone tomorrow morning if she has not heard  from him.     Electronically signed by KAYLIN Tyler on 7/16/2024 at 11:52 AM

## 2024-07-16 NOTE — PLAN OF CARE
Problem: Discharge Planning  Goal: Discharge to home or other facility with appropriate resources  Outcome: Progressing  SW consult received. See SW note 7/16/24.

## 2024-07-16 NOTE — PROGRESS NOTES
[facility]  INPATIENT OCCUPATIONAL THERAPY  [facility] ONC MED 5K  EVALUATION    Time:   Time In: 1116  Time Out: 1136  Timed Code Treatment Minutes: 11 Minutes  Minutes: 20          Date: 2024  Patient Name: Tiffany Baldwin,   Gender: female      MRN: [id number]  : 1950  (74 y.o.)  Referring Practitioner: KRYSTA El  Diagnosis: enteritis  Additional Pertinent Hx: per chart review;  Patient is referred to the emergency department from outpatient oncology infusion center where she had presented for her chemotherapy treatment.  Patient reported nausea and vomiting and diarrhea.  She has had chills.  Reports her urine is very dark almost the color of blood.  Patient did not receive her chemo treatment and was instead referred to the emergency department for further evaluation of illness.     In the ED the patient is found to have enteritis along with lactic acidosis and tachycardia.  Patient is started on empiric Zosyn and hydrated.  Patient will be admitted to [facility] telemetry for further evaluation and treatment    Restrictions/Precautions:  Restrictions/Precautions: General Precautions, Fall Risk  Position Activity Restriction  Other position/activity restrictions: neutropenic, monitor hemoglobin    Subjective  Chart Reviewed: Yes, Orders, Progress Notes, History and Physical  Patient assessed for rehabilitation services?: Yes  Family / Caregiver Present: No    Subjective: RN approved session, patient side lying in bed upon OT arrival and agreeable to eval. patient A & O x 4. patient irritable with social hx questions reiterating she is independent.    Pain: reports sore buttock. Did not rate. Initial facial grimacing upon sitting EOB. Did not affect participation.     Vitals: Vitals not assessed per clinical judgement, see nursing flowsheet    Social/Functional History:  Lives With: Alone  Type of Home: House  Home Layout: One level  Home Access: Stairs to enter with

## 2024-07-16 NOTE — FLOWSHEET NOTE
07/16/24 0257   Treatment Team Notification   Reason for Communication Critical results   Type of Critical Result Laboratory   Critical Lab Information hgb- 6.9 and hematocrit- 20.7   Person Result Received From Unique Page   Critical Lab Result Type Hemoglobin and hematocrit   Name of Team Member Notified Isidro Peraza   Treatment Team Role Advanced Practice Nurse   Method of Communication Secure Message  (Ma'am, this patient has a critical result of hgb- 6.9 and hematocrit- 20.7. patient is admitted due to enteritis, with history of anal and breast Ca. Last chemo was 7/5/24 currently on silas. Vitally stable. Please advised)   Response See orders   Notification Time 0258

## 2024-07-16 NOTE — PROGRESS NOTES
Treatment # 30 of 30 given today.  Total dose to date 5400 cGy of planned dose 5400 cGy.  Patient tolerated treatment fine.  No other issues.

## 2024-07-16 NOTE — PROGRESS NOTES
Hospitalist Progress Note    [facility] [place]      [facility]-06/006-A    YOB: 1950    MRN: [id number]       Acct: [id number]     PCP: Pooja Herrera APRN - CNP    Date of Admission: 7/12/2024    Assessment/Plan:  Enteritis,  due to Enteritis,Enteropathogenic E.Coli    CT abdomen/ pelvis with IV contrast  done 7/12/24 showed: Moderate mucosal thickening is noted in the mid and distal small bowel loops  as well as diffuse colonic wall thickening suggesting enterocolitis  Patient on IV Zosyn  Patient reports abdominal pain and diarrhea improving, will defer GI consult for now  Continue to monitor  Continue supportive care    7/16 patient reports diarrhea improving, no nausea,fever or chills reported  Advance diet to full liquid microbial  Pt did report mild heart burn added Tums prn    2.Acute neutropenia  Level today at 0.7  Continue neutropenic precaution  Oncology on consult  Monitor CBC    7/16 WBC level 0.6    3. Thrombocytopenia: Per record patient is S/p 2 units of irradiated platelets on 7/13/24. Platelet level  7/14 was 21   Today platelet level at 8 no bleeding noted or reported  Ordered 4 units of platelet for transfusion   Per record consult recommends transfusing if platelets below 20,000.    Continue mechanical DVT prophylaxis, avoid chemical DVT prophylaxis due   Anticipated Discharge in : TBD    7/16 plt level 74 s/p 4 units of platelets   Continue to monitor CBC    4.Primary Hypertension  Continue Amlodipine with parameters    5.History of breast cancer    6.Squamous cell carcinoma of anal canal:  Patient follow with  [facility] oncology outpatient    7/16 patient going for radiation therapy today    7.Lactic acidosis now resolved    8.Hypokalemia  Replacement per protocol        Active Hospital Problems    Diagnosis Date Noted    Severe malnutrition (HCC) [E43] 07/15/2024    Thrombocytopenia (HCC) [D69.6] 07/15/2024    Neutropenia (HCC) [D70.9] 07/15/2024     mL IntraVENous 2 times per day    [Held by provider] enoxaparin  40 mg SubCUTAneous Daily    piperacillin-tazobactam  3,375 mg IntraVENous Q8H     PRN Meds: sodium chloride, potassium chloride **OR** potassium chloride, calcium carbonate, sodium chloride, sodium chloride, sodium phosphate 15 mmol in sodium chloride 0.9 % 250 mL IVPB, calcium gluconate, prochlorperazine, sodium chloride flush, sodium chloride, potassium chloride **OR** potassium alternative oral replacement **OR** potassium chloride, magnesium sulfate, ondansetron **OR** ondansetron, polyethylene glycol, acetaminophen **OR** acetaminophen      Intake/Output Summary (Last 24 hours) at 7/16/2024 1406  Last data filed at 7/16/2024 1246  Gross per 24 hour   Intake 2126.22 ml   Output 1340 ml   Net 786.22 ml         Diet:  ADULT ORAL NUTRITION SUPPLEMENT; Breakfast, Lunch, Dinner; Clear Liquid Oral Supplement  ADULT DIET; Full Liquid; Low Microbial    Exam:  /84   Pulse 98   Temp 98.1 °F (36.7 °C) (Oral)   Resp 16   Ht 1.524 m (5')   Wt 69.3 kg (152 lb 12.5 oz)   SpO2 96%   BMI 29.84 kg/m²     General appearance: Awake alert pleasant No apparent distress, appears stated age and cooperative.  HEENT: Pupils equal, round, and reactive to light. Conjunctivae/corneas clear.  Neck: Supple, with full range of motion. No jugular venous distention. Trachea midline.  Respiratory:  Normal respiratory effort. Clear to auscultation, bilaterally without Rales/Wheezes/Rhonchi.  Cardiovascular: Regular rate and rhythm with normal S1/S2 without murmurs, rubs or gallops.  Abdomen: Soft, mild generalized tendernes non-distended with normal bowel sounds.  Musculoskeletal: passive and active ROM x 4 extremities.  Skin: Skin color, texture, turgor normal.  No rashes or lesions.  Neurologic:  Neurovascularly intact without any focal sensory/motor deficits.  Psychiatric: Alert and oriented, thought content appropriate, normal insight  Capillary Refill: Brisk,< 3  seconds   Peripheral Pulses: +2 palpable, equal bilaterally       Labs:   Recent Labs     07/15/24  0620 07/16/24  0207 07/16/24  0420 07/16/24  1229   WBC 0.7* 0.7* 0.6*  --    HGB 8.0* 6.9* 7.1* 7.5*   HCT 23.8* 20.7* 20.3* 22.1*   PLT 8* 83* 74*  --        Recent Labs     07/14/24  0630 07/14/24  0700 07/14/24  1615 07/15/24  0140 07/15/24  0620 07/15/24  0903 07/16/24  0420 07/16/24  1229     --   --   --  143  --  140  --    K 3.0*  --  3.9  --  3.5  --  3.0* 3.8     --   --   --  109  --  106  --    CO2 24  --   --   --  21*  --  25  --    BUN 22  --   --   --  20  --  13  --    CREATININE 0.4  --   --   --  0.4  --  0.4  --    CALCIUM 7.9*  --   --   --  7.6*  --  7.6*  --    PHOS  --    < > 2.5 2.6  --  3.2  --   --     < > = values in this interval not displayed.       Recent Labs     07/14/24  0700   AST 27   ALT 21   BILIDIR 2.0   BILITOT 2.8*   ALKPHOS 53       No results for input(s): \"INR\" in the last 72 hours.  No results for input(s): \"CKTOTAL\", \"TROPONINI\" in the last 72 hours.    Urinalysis:      Lab Results   Component Value Date/Time    NITRU NEGATIVE 07/12/2024 03:00 PM    WBCUA 5-9 07/12/2024 03:00 PM    BACTERIA FEW 07/12/2024 03:00 PM    RBCUA 3-5 07/12/2024 03:00 PM    BLOODU SMALL 07/12/2024 03:00 PM    GLUCOSEU NEGATIVE 07/12/2024 03:00 PM       Radiology:  CT ABDOMEN PELVIS W IV CONTRAST Additional Contrast? None   Final Result   1. Moderate mucosal thickening is noted in the mid and distal small bowel loops   as well as diffuse colonic wall thickening suggesting enterocolitis. A small   amount of ascites is noted in the dependent portion of the pelvis. No fluid   collection or free air is observed. The small bowel loops in the lower   abdomen/pelvis are dilated measuring up to 3 cm in diameter and contain feculent   material suggesting stasis. No transition point is identified to suggest a   mechanical bowel obstruction.      2. Chronic findings are discussed.

## 2024-07-16 NOTE — ED PROVIDER NOTES
[facility] ONC MED 5K  EMERGENCY DEPARTMENT ENCOUNTER      Pt Name: Tiffany Baldwin  MRN: [id number]  Birthdate 1950  Date of evaluation: 7/12/2024  Provider: Jarad Welch DO  4:48 PM    CHIEF COMPLAINT       Chief Complaint   Patient presents with    Fatigue    Nausea         HISTORY OF PRESENT ILLNESS        Tiffany Baldwin is a 74 y.o. female who presents with a CC of abdominal pain.  She is currently undergoing treatment for anal cancer.  She states that over the last 2 weeks she has had a steady decline in strength, and is eating and drinking very little.  She states no inciting factors, no aggravating or alleviating factors, timing is constant.           Nursing Notes were reviewed.    REVIEW OF SYSTEMS       Review of Systems   Constitutional:  Negative for chills and fever.   HENT:  Negative for congestion and rhinorrhea.    Eyes:  Negative for visual disturbance.   Respiratory:  Negative for cough and shortness of breath.    Cardiovascular:  Negative for chest pain.   Gastrointestinal:  Positive for abdominal pain. Negative for nausea and vomiting.   Endocrine: Negative for polydipsia and polyuria.   Genitourinary:  Negative for dysuria.   Musculoskeletal:  Negative for arthralgias and myalgias.   Skin:  Negative for rash and wound.   Neurological:  Negative for dizziness and light-headedness.   Psychiatric/Behavioral:  Negative for suicidal ideas.        Except as noted above the remainder of the review of systems was reviewed and negative.       PAST MEDICAL HISTORY     Past Medical History:   Diagnosis Date    Anal cancer (HCC) 2024    Breast cancer (HCC) 2007    Hyperlipidemia     Hypertension          SURGICAL HISTORY       Past Surgical History:   Procedure Laterality Date    ANUS SURGERY N/A 04/18/2024    Transrectal Excision Squamous Cell Carcinoma performed by Rene Zamudio DO at [facility] OR    BREAST LUMPECTOMY Left 02/2007    COLONOSCOPY  01/27/2024    Dr. Hernandez     rhythm.   Pulmonary:      Effort: Pulmonary effort is normal.      Breath sounds: Normal breath sounds.   Abdominal:      General: There is no distension.      Palpations: Abdomen is soft.      Tenderness: There is generalized abdominal tenderness.   Musculoskeletal:         General: Normal range of motion.      Cervical back: Normal range of motion and neck supple.   Skin:     General: Skin is warm and dry.      Capillary Refill: Capillary refill takes less than 2 seconds.   Neurological:      General: No focal deficit present.      Mental Status: She is alert and oriented to person, place, and time.   Psychiatric:         Mood and Affect: Mood normal.         Behavior: Behavior normal.         DIAGNOSTIC RESULTS     EKG: All EKG's are interpreted by the Emergency Department Physician who either signs or Co-signs this chart in the absence of a cardiologist.         RADIOLOGY:   Non-plain film images such as CT, Ultrasound and MRI are read by the radiologist. Plain radiographic images are visualized and preliminarily interpreted by the emergency physician with the below findings:         Interpretation per the Radiologist below, if available at the time of this note:    CT ABDOMEN PELVIS W IV CONTRAST Additional Contrast? None   Final Result   1. Moderate mucosal thickening is noted in the mid and distal small bowel loops   as well as diffuse colonic wall thickening suggesting enterocolitis. A small   amount of ascites is noted in the dependent portion of the pelvis. No fluid   collection or free air is observed. The small bowel loops in the lower   abdomen/pelvis are dilated measuring up to 3 cm in diameter and contain feculent   material suggesting stasis. No transition point is identified to suggest a   mechanical bowel obstruction.      2. Chronic findings are discussed.            **This report has been created using voice recognition software.  It may contain   minor errors which are inherent in voice

## 2024-07-17 LAB
ANION GAP SERPL CALC-SCNC: 12 MEQ/L (ref 8–16)
BASOPHILS ABSOLUTE: 0 THOU/MM3 (ref 0–0.1)
BASOPHILS NFR BLD AUTO: 0 %
BUN SERPL-MCNC: 10 MG/DL (ref 7–22)
CALCIUM SERPL-MCNC: 8 MG/DL (ref 8.5–10.5)
CHLORIDE SERPL-SCNC: 102 MEQ/L (ref 98–111)
CO2 SERPL-SCNC: 23 MEQ/L (ref 23–33)
CREAT SERPL-MCNC: 0.4 MG/DL (ref 0.4–1.2)
DEPRECATED RDW RBC AUTO: 46.9 FL (ref 35–45)
EOSINOPHIL NFR BLD AUTO: 3.1 %
EOSINOPHILS ABSOLUTE: 0 THOU/MM3 (ref 0–0.4)
ERYTHROCYTE [DISTWIDTH] IN BLOOD BY AUTOMATED COUNT: 14.3 % (ref 11.5–14.5)
GFR SERPL CREATININE-BSD FRML MDRD: > 90 ML/MIN/1.73M2
GLUCOSE SERPL-MCNC: 85 MG/DL (ref 70–108)
HCT VFR BLD AUTO: 21.4 % (ref 37–47)
HGB BLD-MCNC: 7.2 GM/DL (ref 12–16)
IMM GRANULOCYTES # BLD AUTO: 0.01 THOU/MM3 (ref 0–0.07)
IMM GRANULOCYTES NFR BLD AUTO: 1.6 %
LYMPHOCYTES ABSOLUTE: 0 THOU/MM3 (ref 1–4.8)
LYMPHOCYTES NFR BLD AUTO: 4.7 %
MAGNESIUM SERPL-MCNC: 1.6 MG/DL (ref 1.6–2.4)
MCH RBC QN AUTO: 30.8 PG (ref 26–33)
MCHC RBC AUTO-ENTMCNC: 33.6 GM/DL (ref 32.2–35.5)
MCV RBC AUTO: 91.5 FL (ref 81–99)
MONOCYTES ABSOLUTE: 0.1 THOU/MM3 (ref 0.4–1.3)
MONOCYTES NFR BLD AUTO: 21.9 %
NEUTROPHILS ABSOLUTE: 0.4 THOU/MM3 (ref 1.8–7.7)
NEUTROPHILS NFR BLD AUTO: 68.7 %
NRBC BLD AUTO-RTO: 3 /100 WBC
PLATELET # BLD AUTO: 46 THOU/MM3 (ref 130–400)
PLATELET BLD QL SMEAR: ABNORMAL
PMV BLD AUTO: 10.1 FL (ref 9.4–12.4)
POTASSIUM SERPL-SCNC: 3.5 MEQ/L (ref 3.5–5.2)
RBC # BLD AUTO: 2.34 MILL/MM3 (ref 4.2–5.4)
SCAN OF BLOOD SMEAR: NORMAL
SODIUM SERPL-SCNC: 137 MEQ/L (ref 135–145)
WBC # BLD AUTO: 0.6 THOU/MM3 (ref 4.8–10.8)

## 2024-07-17 PROCEDURE — 80048 BASIC METABOLIC PNL TOTAL CA: CPT

## 2024-07-17 PROCEDURE — 6360000002 HC RX W HCPCS: Performed by: PHYSICIAN ASSISTANT

## 2024-07-17 PROCEDURE — 6370000000 HC RX 637 (ALT 250 FOR IP): Performed by: NURSE PRACTITIONER

## 2024-07-17 PROCEDURE — 1200000000 HC SEMI PRIVATE

## 2024-07-17 PROCEDURE — 99232 SBSQ HOSP IP/OBS MODERATE 35: CPT | Performed by: NURSE PRACTITIONER

## 2024-07-17 PROCEDURE — 2580000003 HC RX 258: Performed by: PHYSICIAN ASSISTANT

## 2024-07-17 PROCEDURE — 85025 COMPLETE CBC W/AUTO DIFF WBC: CPT

## 2024-07-17 PROCEDURE — 6370000000 HC RX 637 (ALT 250 FOR IP): Performed by: STUDENT IN AN ORGANIZED HEALTH CARE EDUCATION/TRAINING PROGRAM

## 2024-07-17 PROCEDURE — 99231 SBSQ HOSP IP/OBS SF/LOW 25: CPT | Performed by: NURSE PRACTITIONER

## 2024-07-17 PROCEDURE — 6370000000 HC RX 637 (ALT 250 FOR IP): Performed by: PHYSICIAN ASSISTANT

## 2024-07-17 PROCEDURE — 83735 ASSAY OF MAGNESIUM: CPT

## 2024-07-17 PROCEDURE — 77336 RADIATION PHYSICS CONSULT: CPT | Performed by: RADIOLOGY

## 2024-07-17 PROCEDURE — 36591 DRAW BLOOD OFF VENOUS DEVICE: CPT

## 2024-07-17 PROCEDURE — 1200000003 HC TELEMETRY R&B

## 2024-07-17 RX ADMIN — SODIUM CHLORIDE: 9 INJECTION, SOLUTION INTRAVENOUS at 08:39

## 2024-07-17 RX ADMIN — PIPERACILLIN AND TAZOBACTAM 3375 MG: 3; .375 INJECTION, POWDER, FOR SOLUTION INTRAVENOUS at 00:39

## 2024-07-17 RX ADMIN — SODIUM CHLORIDE, PRESERVATIVE FREE 10 ML: 5 INJECTION INTRAVENOUS at 19:49

## 2024-07-17 RX ADMIN — SODIUM CHLORIDE, PRESERVATIVE FREE 10 ML: 5 INJECTION INTRAVENOUS at 08:37

## 2024-07-17 RX ADMIN — PIPERACILLIN AND TAZOBACTAM 3375 MG: 3; .375 INJECTION, POWDER, FOR SOLUTION INTRAVENOUS at 08:42

## 2024-07-17 RX ADMIN — CALCIUM 500 MG: 500 TABLET ORAL at 19:49

## 2024-07-17 RX ADMIN — Medication: at 08:43

## 2024-07-17 RX ADMIN — PROCHLORPERAZINE MALEATE 10 MG: 10 TABLET ORAL at 19:49

## 2024-07-17 RX ADMIN — PIPERACILLIN AND TAZOBACTAM 3375 MG: 3; .375 INJECTION, POWDER, FOR SOLUTION INTRAVENOUS at 15:32

## 2024-07-17 RX ADMIN — Medication: at 19:49

## 2024-07-17 RX ADMIN — CALCIUM 500 MG: 500 TABLET ORAL at 08:36

## 2024-07-17 RX ADMIN — AMLODIPINE BESYLATE 5 MG: 5 TABLET ORAL at 19:49

## 2024-07-17 RX ADMIN — POTASSIUM CHLORIDE 40 MEQ: 1500 TABLET, EXTENDED RELEASE ORAL at 11:10

## 2024-07-17 RX ADMIN — Medication 5 ML: at 22:35

## 2024-07-17 RX ADMIN — ONDANSETRON 4 MG: 2 INJECTION INTRAMUSCULAR; INTRAVENOUS at 17:18

## 2024-07-17 NOTE — DISCHARGE INSTR - COC
Continuity of Care Form    Patient Name: Tiffany Baldwin   :  1950  MRN:  [id number]    Admit date:  2024  Discharge date:  2024    Code Status Order: Full Code   Advance Directives:     Admitting Physician:  No admitting provider for patient encounter.  PCP: Pooja Herrera APRN - CNP    Discharging Nurse: Haley Marley RN  Discharging Hospital Unit/Room#: 5K-06/006-A  Discharging Unit Phone Number: [phone]    Emergency Contact:   Extended Emergency Contact Information  Primary Emergency Contact: Aron Mitchell  Mobile Phone: [phone]  Relation: Child  Preferred language: English   needed? No  Secondary Emergency Contact: Katy Mitchell  Mobile Phone: [phone]  Relation: Child  Preferred language: English   needed? No    Past Surgical History:  Past Surgical History:   Procedure Laterality Date    ANUS SURGERY N/A 2024    Transrectal Excision Squamous Cell Carcinoma performed by Rene Zamudio DO at [facility] OR    BREAST LUMPECTOMY Left 2007    COLONOSCOPY  2024    Dr. Hernandez    COLONOSCOPY  2013    [facility] Dr. Hernandez    PORT SURGERY Left 2024    SINGLE LUMEN SMARTPORT INSERTION performed by Rene Zamudio DO at [facility] OR    TONSILLECTOMY         Immunization History:     There is no immunization history on file for this patient.    Active Problems:  Patient Active Problem List   Diagnosis Code    Primary hypertension I10    Mixed hyperlipidemia E78.2    Squamous cell carcinoma of anal canal (HCC) C21.1    Enteritis K52.9    Severe malnutrition (HCC) E43    Thrombocytopenia (HCC) D69.6    Neutropenia (HCC) D70.9       Isolation/Infection:   Isolation            Neutropenic          Patient Infection Status       None to display            Nurse Assessment:  Last Vital Signs: /88   Pulse (!) 105   Temp 97.9 °F (36.6 °C) (Oral)   Resp 18   Ht 1.524 m (5')   Wt 69.3 kg (152 lb 12.5 oz)   SpO2 96%   BMI 29.84 kg/m²

## 2024-07-17 NOTE — PROGRESS NOTES
Physician Progress Note      PATIENT:               SEEMA VILLEGAS  [facility] #:                  [id number]  :                       1950  ADMIT DATE:       2024 11:40 AM  DISCH DATE:  RESPONDING  PROVIDER #:        Joana Boyd CNP          QUERY TEXT:    Pt admitted with Enteritis . Pt noted to have  leukopenia, ST, elevated   lactic. If possible, please document in the progress notes and discharge   summary if you are evaluating and /or treating any of the following:  The medical record reflects the following:    Risk Factors: immunocompromised state, rectal cancer, enteritis  Clinical Indicators: WBC 0.2->0.3->0.5->0.7 ;  ST ; LA 2.1->1.3 . GI   panel positive for E. Coli enteropathogenic. CT abdomen pelvis with IV   contrast shows moderate mucosal thickening noted in the mid and distal small   bowel loops as well as diffuse colonic wall thickening suggesting   enterocolitis.  Treatment: IVF @ 125, Zosyn q8, IVF bolus    thank you, Nadja CDS  Options provided:  -- Sepsis, present on admission  -- Sepsis was ruled out  -- Other - I will add my own diagnosis  -- Disagree - Not applicable / Not valid  -- Disagree - Clinically unable to determine / Unknown  -- Refer to Clinical Documentation Reviewer    PROVIDER RESPONSE TEXT:    After further study, sepsis was ruled out for this patient.    Query created by: Maria L Avila on 7/15/2024 12:41 PM      Electronically signed by:  Joana Boyd CNP 2024 4:45 PM

## 2024-07-17 NOTE — PROGRESS NOTES
Hospitalist Progress Note    Patient:  Tiffany CURRY [facility]      Unit/Bed:5K-06/006-A    YOB: 1950    MRN: [id number]       Acct: [id number]     PCP: Pooja Herrera APRN - CNP    Date of Admission: 7/12/2024    Assessment/Plan:  Enteritis,  due to Enteritis,Enteropathogenic E.Coli    CT abdomen/ pelvis with IV contrast  done 7/12/24 showed: Moderate mucosal thickening is noted in the mid and distal small bowel loops  as well as diffuse colonic wall thickening suggesting enterocolitis  Patient on IV Zosyn  Patient reports abdominal pain and diarrhea improving, will defer GI consult for now  Continue to monitor  Continue supportive care    7/16 patient reports diarrhea improving, no nausea,fever or chills reported  Advance diet to full liquid microbial  Pt did report mild heart burn added Tums prn    2.Acute neutropenia  Level today at 0.7  Continue neutropenic precaution  Oncology on consult  Monitor CBC    7/16 WBC level 0.6    7/17 WBC 0.6    3. Thrombocytopenia: Per record patient is S/p 2 units of irradiated platelets on 7/13/24. Platelet level  7/14 was 21   Today platelet level at 8 no bleeding noted or reported  Ordered 4 units of platelet for transfusion   Per record consult recommends transfusing if platelets below 20,000.    Continue mechanical DVT prophylaxis, avoid chemical DVT prophylaxis due   Anticipated Discharge in : TBD    7/16 plt level 74 s/p 4 units of platelets   Continue to monitor CBC    7/17 plt today dropped to 46  No bleeding noted or reported  Continue to monitor and transfuse per oncology recommendations    4.Primary Hypertension  Continue Amlodipine with parameters    5.History of breast cancer    6.Squamous cell carcinoma of anal canal:  Patient follow with  [facility] oncology outpatient    7/16 patient going for radiation therapy today    7.Lactic acidosis now resolved    8.Hypokalemia  Replacement per protocol    8.Normocytic anemia  Hg this am 7.2  Transfuse  and transfuse per oncology recommendations  Patient reported oral sores same noted on the left lateral tongue  Added magic mouthwash prn    Subjective: Patient examined  sitting on the bed ,reports has nausea on and off, also reported has oral sores same noted on the left lateral tongue  Added magic mouthwash prn       Medications:  Reviewed    Infusion Medications    sodium chloride      sodium chloride      sodium chloride      sodium chloride 20 mL/hr at 07/17/24 0839     Scheduled Medications    betamethasone valerate   Topical BID    amLODIPine  5 mg Oral Nightly    calcium elemental  1 tablet Oral BID    sodium chloride flush  5-40 mL IntraVENous 2 times per day    [Held by provider] enoxaparin  40 mg SubCUTAneous Daily    piperacillin-tazobactam  3,375 mg IntraVENous Q8H     PRN Meds: magic (miracle) mouthwash, sodium chloride, potassium chloride **OR** potassium chloride, calcium carbonate, sodium chloride, sodium chloride, sodium phosphate 15 mmol in sodium chloride 0.9 % 250 mL IVPB, calcium gluconate, prochlorperazine, sodium chloride flush, sodium chloride, potassium chloride **OR** potassium alternative oral replacement **OR** potassium chloride, magnesium sulfate, ondansetron **OR** ondansetron, polyethylene glycol, acetaminophen **OR** acetaminophen      Intake/Output Summary (Last 24 hours) at 7/17/2024 1429  Last data filed at 7/17/2024 0747  Gross per 24 hour   Intake 250 ml   Output 600 ml   Net -350 ml         Diet:  ADULT ORAL NUTRITION SUPPLEMENT; Breakfast, Lunch, Dinner; Clear Liquid Oral Supplement  ADULT DIET; Full Liquid; Low Microbial    Exam:  /88   Pulse (!) 105   Temp 97.9 °F (36.6 °C) (Oral)   Resp 18   Ht 1.524 m (5')   Wt 69.3 kg (152 lb 12.5 oz)   SpO2 96%   BMI 29.84 kg/m²     General appearance: Awake alert pleasant No apparent distress, appears stated age and cooperative.  HEENT: Pupils equal, round, and reactive to light. Conjunctivae/corneas clear.  Oral sores

## 2024-07-17 NOTE — PLAN OF CARE
Problem: Discharge Planning  Goal: Discharge to home or other facility with appropriate resources  Outcome: Progressing   D/C home or ECF.    Problem: Safety - Adult  Goal: Free from fall injury  Outcome: Progressing   Patient will not fall this shift. Patient will use call light appropriately for assistance.     Problem: Skin/Tissue Integrity  Goal: Absence of new skin breakdown  Description: 1.  Monitor for areas of redness and/or skin breakdown  2.  Assess vascular access sites hourly  3.  Every 4-6 hours minimum:  Change oxygen saturation probe site  4.  Every 4-6 hours:  If on nasal continuous positive airway pressure, respiratory therapy assess nares and determine need for appliance change or resting period.  Outcome: Progressing   Patient will not have new skin breakdown this shift. Assessment of wound.     Problem: Musculoskeletal - Adult  Goal: Return ADL status to a safe level of function  Outcome: Progressing   Patient will ambulate at a tolerable level.     Problem: Gastrointestinal - Adult  Goal: Maintains or returns to baseline bowel function  Outcome: Progressing  BM. Bowel regemin.    Problem: Pain  Goal: Verbalizes/displays adequate comfort level or baseline comfort level  Outcome: Progressing   Patient will verbalize pain. Pain medication will be provided per order.     Problem: Skin/Tissue Integrity - Adult  Goal: Incisions, wounds, or drain sites healing without S/S of infection  Outcome: Progressing   No new skin breakdown this shift. Patients wound will be assessed for drainage. S/S of infection will be assessed.    Problem: Genitourinary - Adult  Goal: Absence of urinary retention  Outcome: Progressing  Patient will not have   Problem: Hematologic - Adult  Goal: Maintains hematologic stability  Outcome: Progressing     Problem: Nutrition Deficit:  Goal: Optimize nutritional status  Outcome: Progressing     Problem: Neurosensory - Adult  Goal: Achieves maximal functionality and self

## 2024-07-17 NOTE — CARE COORDINATION
7/17/24, 7:36 AM EDT    DISCHARGE PLANNING EVALUATION    Call from pt's son Aron and daughter in law Kirk this morning. They did talk with pt yesterday, [facility] SW did explain referral process. Son does work nights, so is limited in being able to talk on the phone to get updates. They asked for Sw keep daughter in law Kirk updated [phone]    9:25 AM EDT  [facility], SW made a referral, they will review and get back with SW.     1:13 PM EDT  Message from Cory with [facility], unfortunately now does not have a bed available for pt.     Call to Rabia with [facility], referral made. They will review and get back with ROBIN.     1:49 PM EDT  SW stopped by pt's room to update on SNF referral progress, pt sleeping, SW did not wake.     SW did call pt's daughter in law, updated on above.     Call back from Rabia [facility], they can accept pt.    Call back to daughter in law and updated on acceptance to [facility].

## 2024-07-17 NOTE — CARE COORDINATION
7/17/24, 1:52 PM EDT    DISCHARGE ON GOING EVALUATION    [facility] day: 5  Location: -06/006-A Reason for admit: Enteritis [K52.9]     Procedures: n/a    Imaging since last note: none    Barriers to Discharge: Per report, continues with nausea and diarrhea. Full liquids. Zosyn. PRN zofran.     PCP: Pooja Herrera APRN - CNP  Readmission Risk Score: 19.3    Patient Goals/Plan/Treatment Preferences: [facility].

## 2024-07-17 NOTE — PROGRESS NOTES
[facility]    Patient - Tiffany Baldwin   MRN -  [id number]   [id number]   - 1950      Date of Admission -  2024 11:40 AM  Date of evaluation -  2024  Room - [facility]   Hospital Day - 5  Consulting - Joana Galvez APRN - CNP Primary Care Physician - Pooja Herrera APRN - CNP     Consults     Reason for Consult    Current patient in treatment, enteritis, and lactic acidosis  Active Hospital Problem List      Active Hospital Problems    Diagnosis Date Noted    Severe malnutrition (HCC) [E43] 07/15/2024    Thrombocytopenia (HCC) [D69.6] 07/15/2024    Neutropenia (HCC) [D70.9] 07/15/2024    Enteritis [K52.9] 2024    Primary hypertension [I10] 2024     LEX Baldwin is a 74 y.o. female admitted for nausea/vomiting and diarrhea on 24. She had presented to oncology for fluid bolus after experiencing nausea/vomiting and diarrhea. Per the patient, the night prior she had difficulty returning to bed after getting up to urinate. Then later on that night, she had an episode of bowel incontinence and was unable to clean up herself or her bed. She reports progressive weakness. Upon ER visit labs were done. Labs (2024): WBC: 0.2 RBC: 3.57 Hgb: 10.8 Hct: 31.6 MCV: 88.5 Platelet: 13 Sodium: 133 Potassium: 3.6 Chloride: 102 BUN: 17 Creat: 12 Calcium: 8.1 Lactic Acid 2.1. She received platelets on 2024. She was then admitted to the hospital with our service consulted.    07/15/2024: The patient is resting in bed, no family present. The patient reports she is feeling better today. She feels unsafe by herself at home and reports she has had several episodes of not being able to get up or help herself. She is willing to discuss possible assisted living/nursing home. Case management consulted.     24: The patient is resting in bed, drowsy. No family present. The patient reports she continues to see improvement from  present. The appendix is normal. No bowel obstruction, fluid collection, or free air is observed. A small sliding-type hiatal hernia is present. Retroperitoneum / lymph nodes: The aorta is not dilated. No lymphadenopathy is visualized. Pelvis: No adnexal lesions are observed. Musculoskeletal: Multilevel degenerative disc disease is present. Diffuse osteopenia is observed. The visualized skeletal structures appear intact.     1. Moderate mucosal thickening is noted in the mid and distal small bowel loops as well as diffuse colonic wall thickening suggesting enterocolitis. A small amount of ascites is noted in the dependent portion of the pelvis. No fluid collection or free air is observed. The small bowel loops in the lower abdomen/pelvis are dilated measuring up to 3 cm in diameter and contain feculent material suggesting stasis. No transition point is identified to suggest a mechanical bowel obstruction. 2. Chronic findings are discussed. **This report has been created using voice recognition software.  It may contain minor errors which are inherent in voice recognition technology.** Electronically signed by Dr. Caren Cadet    XR CHEST PORTABLE    Result Date: 7/12/2024  PROCEDURE: XR CHEST PORTABLE CLINICAL INFORMATION: 74-year-old female with weakness following chemotherapy. COMPARISON: No prior study. TECHNIQUE: AP upright view of the chest was obtained. FINDINGS: Some surgical clips project over the left axillary region. There is a left-sided chest wall infusion port with the tip of the catheter in the SVC. The lungs are clear. The cardiac silhouette and pulmonary vasculature are within normal limits. There is no significant pleural effusion or pneumothorax. Visualized portions of the upper abdomen are within normal limits. The osseous structures are intact. No acute fractures or suspicious osseous lesions.     There is no acute intrathoracic process. **This report has been created using voice recognition

## 2024-07-18 ENCOUNTER — CLINICAL DOCUMENTATION (OUTPATIENT)
Dept: CASE MANAGEMENT | Age: 74
End: 2024-07-18

## 2024-07-18 LAB
ANION GAP SERPL CALC-SCNC: 13 MEQ/L (ref 8–16)
BACTERIA BLD AEROBE CULT: NORMAL
BASOPHILS ABSOLUTE: 0 THOU/MM3 (ref 0–0.1)
BASOPHILS ABSOLUTE: 0 THOU/MM3 (ref 0–0.1)
BASOPHILS NFR BLD AUTO: 0 %
BASOPHILS NFR BLD AUTO: 1.1 %
BUN SERPL-MCNC: 8 MG/DL (ref 7–22)
CALCIUM SERPL-MCNC: 7.7 MG/DL (ref 8.5–10.5)
CHLORIDE SERPL-SCNC: 101 MEQ/L (ref 98–111)
CO2 SERPL-SCNC: 22 MEQ/L (ref 23–33)
CREAT SERPL-MCNC: 0.4 MG/DL (ref 0.4–1.2)
DEPRECATED RDW RBC AUTO: 44.9 FL (ref 35–45)
DEPRECATED RDW RBC AUTO: 45.1 FL (ref 35–45)
EOSINOPHIL NFR BLD AUTO: 0 %
EOSINOPHIL NFR BLD AUTO: 1.2 %
EOSINOPHILS ABSOLUTE: 0 THOU/MM3 (ref 0–0.4)
EOSINOPHILS ABSOLUTE: 0 THOU/MM3 (ref 0–0.4)
ERYTHROCYTE [DISTWIDTH] IN BLOOD BY AUTOMATED COUNT: 13.9 % (ref 11.5–14.5)
ERYTHROCYTE [DISTWIDTH] IN BLOOD BY AUTOMATED COUNT: 14 % (ref 11.5–14.5)
GFR SERPL CREATININE-BSD FRML MDRD: > 90 ML/MIN/1.73M2
GLUCOSE SERPL-MCNC: 84 MG/DL (ref 70–108)
HCT VFR BLD AUTO: 24.1 % (ref 37–47)
HCT VFR BLD AUTO: 24.5 % (ref 37–47)
HGB BLD-MCNC: 8.1 GM/DL (ref 12–16)
HGB BLD-MCNC: 8.3 GM/DL (ref 12–16)
IMM GRANULOCYTES # BLD AUTO: 0.04 THOU/MM3 (ref 0–0.07)
IMM GRANULOCYTES # BLD AUTO: 0.05 THOU/MM3 (ref 0–0.07)
IMM GRANULOCYTES NFR BLD AUTO: 4.7 %
IMM GRANULOCYTES NFR BLD AUTO: 5.5 %
LYMPHOCYTES ABSOLUTE: 0.1 THOU/MM3 (ref 1–4.8)
LYMPHOCYTES ABSOLUTE: 0.1 THOU/MM3 (ref 1–4.8)
LYMPHOCYTES NFR BLD AUTO: 11 %
LYMPHOCYTES NFR BLD AUTO: 7.1 %
MCH RBC QN AUTO: 30.1 PG (ref 26–33)
MCH RBC QN AUTO: 30.3 PG (ref 26–33)
MCHC RBC AUTO-ENTMCNC: 33.6 GM/DL (ref 32.2–35.5)
MCHC RBC AUTO-ENTMCNC: 33.9 GM/DL (ref 32.2–35.5)
MCV RBC AUTO: 89.4 FL (ref 81–99)
MCV RBC AUTO: 89.6 FL (ref 81–99)
MONOCYTES ABSOLUTE: 0.2 THOU/MM3 (ref 0.4–1.3)
MONOCYTES ABSOLUTE: 0.2 THOU/MM3 (ref 0.4–1.3)
MONOCYTES NFR BLD AUTO: 19.8 %
MONOCYTES NFR BLD AUTO: 22.4 %
NEUTROPHILS ABSOLUTE: 0.6 THOU/MM3 (ref 1.8–7.7)
NEUTROPHILS ABSOLUTE: 0.6 THOU/MM3 (ref 1.8–7.7)
NEUTROPHILS NFR BLD AUTO: 62.6 %
NEUTROPHILS NFR BLD AUTO: 64.6 %
NRBC BLD AUTO-RTO: 0 /100 WBC
NRBC BLD AUTO-RTO: 0 /100 WBC
PLATELET # BLD AUTO: 27 THOU/MM3 (ref 130–400)
PLATELET # BLD AUTO: 30 THOU/MM3 (ref 130–400)
PLATELET BLD QL SMEAR: ABNORMAL
PLATELET BLD QL SMEAR: ABNORMAL
PMV BLD AUTO: 10 FL (ref 9.4–12.4)
PMV BLD AUTO: 9.7 FL (ref 9.4–12.4)
POLYCHROMASIA BLD QL SMEAR: ABNORMAL
POTASSIUM SERPL-SCNC: 3.7 MEQ/L (ref 3.5–5.2)
RBC # BLD AUTO: 2.69 MILL/MM3 (ref 4.2–5.4)
RBC # BLD AUTO: 2.74 MILL/MM3 (ref 4.2–5.4)
SCAN OF BLOOD SMEAR: NORMAL
SCAN OF BLOOD SMEAR: NORMAL
SODIUM SERPL-SCNC: 136 MEQ/L (ref 135–145)
TOXIC GRANULES BLD QL SMEAR: PRESENT
TOXIC GRANULES BLD QL SMEAR: PRESENT
VARIANT LYMPHS BLD QL SMEAR: ABNORMAL %
WBC # BLD AUTO: 0.9 THOU/MM3 (ref 4.8–10.8)
WBC # BLD AUTO: 0.9 THOU/MM3 (ref 4.8–10.8)

## 2024-07-18 PROCEDURE — 6360000002 HC RX W HCPCS: Performed by: PHYSICIAN ASSISTANT

## 2024-07-18 PROCEDURE — 99232 SBSQ HOSP IP/OBS MODERATE 35: CPT | Performed by: NURSE PRACTITIONER

## 2024-07-18 PROCEDURE — 85025 COMPLETE CBC W/AUTO DIFF WBC: CPT

## 2024-07-18 PROCEDURE — 6370000000 HC RX 637 (ALT 250 FOR IP): Performed by: PHYSICIAN ASSISTANT

## 2024-07-18 PROCEDURE — 1200000000 HC SEMI PRIVATE

## 2024-07-18 PROCEDURE — 80048 BASIC METABOLIC PNL TOTAL CA: CPT

## 2024-07-18 PROCEDURE — 6370000000 HC RX 637 (ALT 250 FOR IP): Performed by: STUDENT IN AN ORGANIZED HEALTH CARE EDUCATION/TRAINING PROGRAM

## 2024-07-18 PROCEDURE — 6360000002 HC RX W HCPCS: Performed by: NURSE PRACTITIONER

## 2024-07-18 PROCEDURE — 2580000003 HC RX 258: Performed by: PHYSICIAN ASSISTANT

## 2024-07-18 PROCEDURE — 36430 TRANSFUSION BLD/BLD COMPNT: CPT

## 2024-07-18 PROCEDURE — P9037 PLATE PHERES LEUKOREDU IRRAD: HCPCS

## 2024-07-18 RX ORDER — SODIUM CHLORIDE 9 MG/ML
INJECTION, SOLUTION INTRAVENOUS PRN
Status: DISCONTINUED | OUTPATIENT
Start: 2024-07-18 | End: 2024-07-25 | Stop reason: HOSPADM

## 2024-07-18 RX ADMIN — CALCIUM 500 MG: 500 TABLET ORAL at 21:51

## 2024-07-18 RX ADMIN — Medication: at 08:53

## 2024-07-18 RX ADMIN — PIPERACILLIN AND TAZOBACTAM 3375 MG: 3; .375 INJECTION, POWDER, FOR SOLUTION INTRAVENOUS at 00:33

## 2024-07-18 RX ADMIN — PIPERACILLIN AND TAZOBACTAM 3375 MG: 3; .375 INJECTION, POWDER, FOR SOLUTION INTRAVENOUS at 08:52

## 2024-07-18 RX ADMIN — CALCIUM 500 MG: 500 TABLET ORAL at 08:56

## 2024-07-18 RX ADMIN — AMLODIPINE BESYLATE 5 MG: 5 TABLET ORAL at 21:51

## 2024-07-18 RX ADMIN — Medication: at 21:59

## 2024-07-18 RX ADMIN — FILGRASTIM-AAFI 480 MCG: 480 INJECTION, SOLUTION SUBCUTANEOUS at 18:27

## 2024-07-18 RX ADMIN — PIPERACILLIN AND TAZOBACTAM 3375 MG: 3; .375 INJECTION, POWDER, FOR SOLUTION INTRAVENOUS at 16:45

## 2024-07-18 NOTE — CARE COORDINATION
7/18/24, 9:02 AM EDT    DISCHARGE PLANNING EVALUATION    SW met with Tiffany this morning, updated on SNF acceptance to [facility]. SW did discuss transport family vs ambulette, discussed estimated cost of ambulette. Pt reports he son can take her places early in the morning and her daughter can take her after 2:30pm.

## 2024-07-18 NOTE — PROGRESS NOTES
Hospitalist Progress Note    [facility] [place]      [facility]5K-06/006-A    YOB: 1950    MRN: [id number]       Acct: [id number]     PCP: Pooja Herrera APRN - CNP    Date of Admission: 7/12/2024    Assessment/Plan:  Enteritis,  due to Enteritis,Enteropathogenic E.Coli    CT abdomen/ pelvis with IV contrast  done 7/12/24 showed: Moderate mucosal thickening is noted in the mid and distal small bowel loops  as well as diffuse colonic wall thickening suggesting enterocolitis  Patient on IV Zosyn  Patient reports abdominal pain and diarrhea improving, will defer GI consult for now  Continue to monitor  Continue supportive care    7/16 patient reports diarrhea improving, no nausea,fever or chills reported  Advance diet to full liquid microbial  Pt did report mild heart burn added Tums prn        2.Acute neutropenia  Level today at 0.7  Continue neutropenic precaution  Oncology on consult  Monitor CBC    7/16 WBC level 0.6    7/17 WBC 0.6    7/18 WBC 0.9  Patient remains afebrile will start Neupogen per oncology recommendations    3. Thrombocytopenia: Per record patient is S/p 2 units of irradiated platelets on 7/13/24. Platelet level  7/14 was 21   Today platelet level at 8 no bleeding noted or reported  Ordered 4 units of platelet for transfusion   Per record consult recommends transfusing if platelets below 20,000.    Continue mechanical DVT prophylaxis, avoid chemical DVT prophylaxis due   Anticipated Discharge in : TBD    7/16 plt level 74 s/p 4 units of platelets   Continue to monitor CBC    7/17 plt today dropped to 46  No bleeding noted or reported  Continue to monitor and transfuse per oncology recommendations    7/18 Plt level this am 30    4.Primary Hypertension  Continue Amlodipine with parameters    5.History of breast cancer    6.Squamous cell carcinoma of anal canal:  Patient follow with  [facility] oncology outpatient    7/16 patient going for radiation therapy  Microbial    Exam:  BP (!) 129/94   Pulse (!) 112   Temp 98.2 °F (36.8 °C) (Oral)   Resp 16   Ht 1.524 m (5')   Wt 69.7 kg (153 lb 10.6 oz)   SpO2 96%   BMI 30.01 kg/m²     General appearance: Awake alert pleasant No apparent distress, appears stated age and cooperative.  HEENT: Pupils equal, round, and reactive to light. Conjunctivae/corneas clear.  Oral sores left lateral tongue  Neck: Supple, with full range of motion. No jugular venous distention. Trachea midline.  Respiratory:  Normal respiratory effort. Clear to auscultation, bilaterally without Rales/Wheezes/Rhonchi.  Cardiovascular: Regular rate and rhythm with normal S1/S2 without murmurs, rubs or gallops.  Abdomen: Soft, mild generalized tendernes non-distended with normal bowel sounds.  Musculoskeletal: passive and active ROM x 4 extremities.  Skin: Skin color, texture, turgor normal.  No rashes or lesions.  Neurologic:  Neurovascularly intact without any focal sensory/motor deficits.  Psychiatric: Alert and oriented, thought content appropriate, normal insight  Capillary Refill: Brisk,< 3 seconds   Peripheral Pulses: +2 palpable, equal bilaterally       Labs:   Recent Labs     07/16/24  0420 07/16/24  1229 07/17/24  0630 07/18/24  0650   WBC 0.6*  --  0.6* 0.9*   HGB 7.1* 7.5* 7.2* 8.1*   HCT 20.3* 22.1* 21.4* 24.1*   PLT 74*  --  46* 30*       Recent Labs     07/16/24  0420 07/16/24  1229 07/17/24  0832 07/18/24  0650     --  137 136   K 3.0* 3.8 3.5 3.7     --  102 101   CO2 25  --  23 22*   BUN 13  --  10 8   CREATININE 0.4  --  0.4 0.4   CALCIUM 7.6*  --  8.0* 7.7*       No results for input(s): \"AST\", \"ALT\", \"BILIDIR\", \"BILITOT\", \"ALKPHOS\" in the last 72 hours.    No results for input(s): \"INR\" in the last 72 hours.  No results for input(s): \"CKTOTAL\", \"TROPONINI\" in the last 72 hours.    Urinalysis:      Lab Results   Component Value Date/Time    NITRU NEGATIVE 07/12/2024 03:00 PM    WBCUA 5-9 07/12/2024 03:00 PM    BACTERIA FEW

## 2024-07-19 LAB
ABO: NORMAL
ANION GAP SERPL CALC-SCNC: 15 MEQ/L (ref 8–16)
ANTIBODY SCREEN: NORMAL
AUTO DIFF PNL BLD: ABNORMAL
BASOPHILS ABSOLUTE: 0 THOU/MM3 (ref 0–0.1)
BASOPHILS NFR BLD AUTO: 0.4 %
BUN SERPL-MCNC: 6 MG/DL (ref 7–22)
CA-I BLD ISE-SCNC: 1.1 MMOL/L (ref 1.12–1.32)
CALCIUM SERPL-MCNC: 7.2 MG/DL (ref 8.5–10.5)
CHLORIDE SERPL-SCNC: 99 MEQ/L (ref 98–111)
CO2 SERPL-SCNC: 22 MEQ/L (ref 23–33)
CREAT SERPL-MCNC: 0.3 MG/DL (ref 0.4–1.2)
DEPRECATED RDW RBC AUTO: 44.6 FL (ref 35–45)
EOSINOPHIL NFR BLD AUTO: 0 %
EOSINOPHILS ABSOLUTE: 0 THOU/MM3 (ref 0–0.4)
ERYTHROCYTE [DISTWIDTH] IN BLOOD BY AUTOMATED COUNT: 13.7 % (ref 11.5–14.5)
GFR SERPL CREATININE-BSD FRML MDRD: > 90 ML/MIN/1.73M2
GLUCOSE SERPL-MCNC: 73 MG/DL (ref 70–108)
HCT VFR BLD AUTO: 20.7 % (ref 37–47)
HCT VFR BLD AUTO: 29.5 % (ref 37–47)
HGB BLD-MCNC: 10 GM/DL (ref 12–16)
HGB BLD-MCNC: 6.9 GM/DL (ref 12–16)
IMM GRANULOCYTES # BLD AUTO: 0.04 THOU/MM3 (ref 0–0.07)
IMM GRANULOCYTES NFR BLD AUTO: 1.5 %
LYMPHOCYTES ABSOLUTE: 0.2 THOU/MM3 (ref 1–4.8)
LYMPHOCYTES NFR BLD AUTO: 6.5 %
MAGNESIUM SERPL-MCNC: 1.5 MG/DL (ref 1.6–2.4)
MCH RBC QN AUTO: 29.9 PG (ref 26–33)
MCHC RBC AUTO-ENTMCNC: 33.3 GM/DL (ref 32.2–35.5)
MCV RBC AUTO: 89.6 FL (ref 81–99)
MONOCYTES ABSOLUTE: 0.3 THOU/MM3 (ref 0.4–1.3)
MONOCYTES NFR BLD AUTO: 10 %
NEUTROPHILS ABSOLUTE: 2.1 THOU/MM3 (ref 1.8–7.7)
NEUTROPHILS NFR BLD AUTO: 81.6 %
NRBC BLD AUTO-RTO: 1 /100 WBC
PATHOLOGIST REVIEW: ABNORMAL
PLATELET # BLD AUTO: 88 THOU/MM3 (ref 130–400)
PLATELET BLD QL SMEAR: ABNORMAL
PMV BLD AUTO: 10.2 FL (ref 9.4–12.4)
POLYCHROMASIA BLD QL SMEAR: ABNORMAL
POTASSIUM SERPL-SCNC: 2.9 MEQ/L (ref 3.5–5.2)
POTASSIUM SERPL-SCNC: 3.9 MEQ/L (ref 3.5–5.2)
RBC # BLD AUTO: 2.31 MILL/MM3 (ref 4.2–5.4)
RH FACTOR: NORMAL
SCAN OF BLOOD SMEAR: NORMAL
SODIUM SERPL-SCNC: 136 MEQ/L (ref 135–145)
TOXIC GRANULES BLD QL SMEAR: PRESENT
WBC # BLD AUTO: 2.6 THOU/MM3 (ref 4.8–10.8)

## 2024-07-19 PROCEDURE — 36430 TRANSFUSION BLD/BLD COMPNT: CPT

## 2024-07-19 PROCEDURE — 80048 BASIC METABOLIC PNL TOTAL CA: CPT

## 2024-07-19 PROCEDURE — 86850 RBC ANTIBODY SCREEN: CPT

## 2024-07-19 PROCEDURE — 85014 HEMATOCRIT: CPT

## 2024-07-19 PROCEDURE — 85025 COMPLETE CBC W/AUTO DIFF WBC: CPT

## 2024-07-19 PROCEDURE — 6360000002 HC RX W HCPCS: Performed by: NURSE PRACTITIONER

## 2024-07-19 PROCEDURE — 1200000000 HC SEMI PRIVATE

## 2024-07-19 PROCEDURE — 6370000000 HC RX 637 (ALT 250 FOR IP): Performed by: STUDENT IN AN ORGANIZED HEALTH CARE EDUCATION/TRAINING PROGRAM

## 2024-07-19 PROCEDURE — 86923 COMPATIBILITY TEST ELECTRIC: CPT

## 2024-07-19 PROCEDURE — 82330 ASSAY OF CALCIUM: CPT

## 2024-07-19 PROCEDURE — 85018 HEMOGLOBIN: CPT

## 2024-07-19 PROCEDURE — 99232 SBSQ HOSP IP/OBS MODERATE 35: CPT | Performed by: NURSE PRACTITIONER

## 2024-07-19 PROCEDURE — P9016 RBC LEUKOCYTES REDUCED: HCPCS

## 2024-07-19 PROCEDURE — 6360000002 HC RX W HCPCS: Performed by: PHYSICIAN ASSISTANT

## 2024-07-19 PROCEDURE — 83735 ASSAY OF MAGNESIUM: CPT

## 2024-07-19 PROCEDURE — 86901 BLOOD TYPING SEROLOGIC RH(D): CPT

## 2024-07-19 PROCEDURE — 6370000000 HC RX 637 (ALT 250 FOR IP): Performed by: PHYSICIAN ASSISTANT

## 2024-07-19 PROCEDURE — 97110 THERAPEUTIC EXERCISES: CPT

## 2024-07-19 PROCEDURE — 6370000000 HC RX 637 (ALT 250 FOR IP): Performed by: NURSE PRACTITIONER

## 2024-07-19 PROCEDURE — 2580000003 HC RX 258: Performed by: PHYSICIAN ASSISTANT

## 2024-07-19 PROCEDURE — 84132 ASSAY OF SERUM POTASSIUM: CPT

## 2024-07-19 PROCEDURE — 86900 BLOOD TYPING SEROLOGIC ABO: CPT

## 2024-07-19 PROCEDURE — 97116 GAIT TRAINING THERAPY: CPT

## 2024-07-19 RX ORDER — PROCHLORPERAZINE EDISYLATE 5 MG/ML
10 INJECTION INTRAMUSCULAR; INTRAVENOUS EVERY 6 HOURS PRN
Status: DISCONTINUED | OUTPATIENT
Start: 2024-07-19 | End: 2024-07-25 | Stop reason: HOSPADM

## 2024-07-19 RX ORDER — DICYCLOMINE HYDROCHLORIDE 10 MG/1
20 CAPSULE ORAL
Status: DISCONTINUED | OUTPATIENT
Start: 2024-07-19 | End: 2024-07-25 | Stop reason: HOSPADM

## 2024-07-19 RX ORDER — SODIUM CHLORIDE 9 MG/ML
INJECTION, SOLUTION INTRAVENOUS PRN
Status: DISCONTINUED | OUTPATIENT
Start: 2024-07-19 | End: 2024-07-25 | Stop reason: HOSPADM

## 2024-07-19 RX ADMIN — FILGRASTIM-AAFI 480 MCG: 480 INJECTION, SOLUTION SUBCUTANEOUS at 18:26

## 2024-07-19 RX ADMIN — Medication: at 09:14

## 2024-07-19 RX ADMIN — PROCHLORPERAZINE EDISYLATE 10 MG: 5 INJECTION INTRAMUSCULAR; INTRAVENOUS at 12:18

## 2024-07-19 RX ADMIN — ONDANSETRON 4 MG: 2 INJECTION INTRAMUSCULAR; INTRAVENOUS at 17:14

## 2024-07-19 RX ADMIN — POTASSIUM CHLORIDE 20 MEQ: 29.8 INJECTION, SOLUTION INTRAVENOUS at 09:13

## 2024-07-19 RX ADMIN — Medication: at 21:45

## 2024-07-19 RX ADMIN — AMLODIPINE BESYLATE 5 MG: 5 TABLET ORAL at 21:43

## 2024-07-19 RX ADMIN — DICYCLOMINE HYDROCHLORIDE 20 MG: 10 CAPSULE ORAL at 21:43

## 2024-07-19 RX ADMIN — POTASSIUM CHLORIDE 20 MEQ: 29.8 INJECTION, SOLUTION INTRAVENOUS at 10:26

## 2024-07-19 RX ADMIN — ONDANSETRON 4 MG: 2 INJECTION INTRAMUSCULAR; INTRAVENOUS at 09:30

## 2024-07-19 RX ADMIN — PIPERACILLIN AND TAZOBACTAM 3375 MG: 3; .375 INJECTION, POWDER, FOR SOLUTION INTRAVENOUS at 01:45

## 2024-07-19 RX ADMIN — CALCIUM 500 MG: 500 TABLET ORAL at 09:20

## 2024-07-19 RX ADMIN — PROCHLORPERAZINE EDISYLATE 10 MG: 5 INJECTION INTRAMUSCULAR; INTRAVENOUS at 18:29

## 2024-07-19 RX ADMIN — CALCIUM 500 MG: 500 TABLET ORAL at 21:43

## 2024-07-19 RX ADMIN — POTASSIUM CHLORIDE 20 MEQ: 29.8 INJECTION, SOLUTION INTRAVENOUS at 11:35

## 2024-07-19 ASSESSMENT — PAIN SCALES - GENERAL: PAINLEVEL_OUTOF10: 0

## 2024-07-19 NOTE — PROGRESS NOTES
Fortunato friendly visit to pt's IP room 5k06 this afternoon.     Upon entering pt's room pt shared \"I was/am terribly sick, but think I am getting better\".    Pt reports she will be going to [facility] at discharge.    Fortunato provided encouragement to pt in conversation.    Patient voiced appreciation  for Fortunato visit.  Pt will FU with Oncology after discharge, but knows she can call for any concern or issue.  Contact number reiterated with pt.

## 2024-07-19 NOTE — PLAN OF CARE
Problem: Discharge Planning  Goal: Discharge to home or other facility with appropriate resources  7/19/2024 1536 by Jeff Waller RN  Outcome: Progressing  Flowsheets (Taken 7/19/2024 1536)  Discharge to home or other facility with appropriate resources:   Identify barriers to discharge with patient and caregiver   Identify discharge learning needs (meds, wound care, etc)   Arrange for needed discharge resources and transportation as appropriate   Refer to discharge planning if patient needs post-hospital services based on physician order or complex needs related to functional status, cognitive ability or social support system     Problem: Safety - Adult  Goal: Free from fall injury  7/19/2024 1536 by Jeff Waller, RN  Outcome: Progressing  Flowsheets (Taken 7/19/2024 1536)  Free From Fall Injury: Instruct family/caregiver on patient safety     Problem: Skin/Tissue Integrity  Goal: Absence of new skin breakdown  Description: 1.  Monitor for areas of redness and/or skin breakdown  2.  Assess vascular access sites hourly  3.  Every 4-6 hours minimum:  Change oxygen saturation probe site  4.  Every 4-6 hours:  If on nasal continuous positive airway pressure, respiratory therapy assess nares and determine need for appliance change or resting period.  7/19/2024 1536 by Jeff Waller, RN  Outcome: Progressing     Problem: Musculoskeletal - Adult  Goal: Return ADL status to a safe level of function  7/19/2024 1536 by Jeff Waller RN  Outcome: Progressing  Flowsheets (Taken 7/19/2024 1536)  Return ADL Status to a Safe Level of Function:   Administer medication as ordered   Assess activities of daily living deficits and provide assistive devices as needed   Obtain physical therapy/occupational therapy consults as needed     Problem: Gastrointestinal - Adult  Goal: Maintains or returns to baseline bowel function  7/19/2024 1536 by Jeff Waller, RN  Outcome: Progressing  Flowsheets (Taken 7/19/2024 1536)  Maintains or  products/factors as ordered     Problem: Nutrition Deficit:  Goal: Optimize nutritional status  7/19/2024 1536 by Jeff Waller RN  Outcome: Progressing  Flowsheets (Taken 7/19/2024 1536)  Nutrient intake appropriate for improving, restoring, or maintaining nutritional needs:   Assess nutritional status and recommend course of action   Monitor oral intake, labs, and treatment plans   Recommend appropriate diets, oral nutritional supplements, and vitamin/mineral supplements     Problem: Neurosensory - Adult  Goal: Achieves maximal functionality and self care  7/19/2024 1536 by Jeff Waller RN  Outcome: Progressing  Flowsheets (Taken 7/19/2024 1536)  Achieves maximal functionality and self care:   Monitor swallowing and airway patency with patient fatigue and changes in neurological status   Encourage and assist patient to increase activity and self care with guidance from physical therapy/occupational therapy     Problem: Respiratory - Adult  Goal: Achieves optimal ventilation and oxygenation  7/19/2024 1536 by Jeff Waller RN  Outcome: Progressing  Flowsheets (Taken 7/19/2024 1536)  Achieves optimal ventilation and oxygenation:   Assess for changes in respiratory status   Assess for changes in mentation and behavior   Assess and instruct to report shortness of breath or any respiratory difficulty   Encourage broncho-pulmonary hygiene including cough, deep breathe, incentive spirometry     Problem: Cardiovascular - Adult  Goal: Absence of cardiac dysrhythmias or at baseline  7/19/2024 1536 by Jeff Waller RN  Outcome: Progressing  Flowsheets (Taken 7/19/2024 1536)  Absence of cardiac dysrhythmias or at baseline:   Monitor cardiac rate and rhythm   Assess for signs of decreased cardiac output     Problem: Infection - Adult  Goal: Absence of fever/infection during anticipated neutropenic period  7/19/2024 1536 by Jeff Waller RN  Outcome: Progressing  Flowsheets (Taken 7/19/2024 1536)  Absence of fever/infection

## 2024-07-19 NOTE — PLAN OF CARE
Problem: Pain  Goal: Verbalizes/displays adequate comfort level or baseline comfort level  7/19/2024 0441 by Christie Martínez RN  Outcome: Progressing  Flowsheets  Taken 7/19/2024 0314  Verbalizes/displays adequate comfort level or baseline comfort level:   Encourage patient to monitor pain and request assistance   Assess pain using appropriate pain scale   Administer analgesics based on type and severity of pain and evaluate response   Implement non-pharmacological measures as appropriate and evaluate response    Problem: Infection - Adult  Goal: Absence of fever/infection during anticipated neutropenic period  7/19/2024 0441 by Christie Martínez, RN  Outcome: Progressing  Flowsheets (Taken 7/18/2024 2141)  Absence of fever/infection during anticipated neutropenic period: Monitor white blood cell count     Problem: Hematologic - Adult  Goal: Maintains hematologic stability  7/19/2024 0441 by Christie Martínez RN  Outcome: Progressing  Flowsheets (Taken 7/18/2024 2141)  Maintains hematologic stability:   Assess for signs and symptoms of bleeding or hemorrhage   Monitor labs for bleeding or clotting disorders   Administer blood products/factors as ordered     Problem: Genitourinary - Adult  Goal: Absence of urinary retention  7/19/2024 0441 by Christie Martínez, RN  Outcome: Progressing     Problem: Gastrointestinal - Adult  Goal: Maintains or returns to baseline bowel function  7/19/2024 0441 by Christie Martínez RN  Outcome: Progressing     Problem: Discharge Planning  Goal: Discharge to home or other facility with appropriate resources  7/19/2024 0441 by Christie Martínez, RN  Outcome: Progressing     Care plan reviewed with the patient. Patient verbalized understanding of the care plan

## 2024-07-19 NOTE — CARE COORDINATION
7/19/24, 3:00 PM EDT    DISCHARGE ON GOING EVALUATION    Tiffany CURRY [facility] day: 7  Location: -06/006-A Reason for admit: Enteritis [K52.9]         Barriers to Discharge: H/H 6.9/20.7, Platelets 88. Hospitalist, Oncology and ID following, SS, Dietician, PT/OT, Lovenox on hold, Nivestym every evening, prn Tylenol, Compazine and Zofran, daily BMP and CBC, patient transfused with one unit PRBC's, full liquid diet, SCD's, up with assistance.      PCP: Pooja Herrera APRN - CNP  Readmission Risk Score: 18.9    Patient Goals/Plan/Treatment Preferences: Tiffany is from home alone. New SNF for rehab at [facility].

## 2024-07-19 NOTE — FLOWSHEET NOTE
07/18/24 1620   Treatment Team Notification   Reason for Communication Evaluate   Name of Team Member Notified Joana Galvez   Treatment Team Role Advanced Practice Nurse   Method of Communication Secure Message   Response See orders   Notification Time 1620     Hospitalist notified of change in pt condition. Message sent as follows \"pt just had an episode of rectal bleeding. It was noted to be yovany red blood. Pt denies hemhorroids at this time. HR was up to 137, RR 20-22, BP elevated for her at 134/91 and Afebrile. Pt Denies any SOB, chest pain, palpitations, dizziness or headache. Any further instructions? \"     STAT CBC ordered and obtained. 4 units of platelets ordered due to active bleeding. Will continue to monitor and assess.

## 2024-07-19 NOTE — PROGRESS NOTES
Hospitalist Progress Note    Patient:  Tiffany CURRY [facility]      Unit/Bed:5K-06/006-A    YOB: 1950    MRN: [id number]       Acct: [id number]     PCP: Pooja Herrera APRN - CNP    Date of Admission: 7/12/2024    Assessment/Plan:  Enteritis,  due to Enteritis,Enteropathogenic E.Coli    CT abdomen/ pelvis with IV contrast  done 7/12/24 showed: Moderate mucosal thickening is noted in the mid and distal small bowel loops  as well as diffuse colonic wall thickening suggesting enterocolitis  Patient on IV Zosyn  Patient reports abdominal pain and diarrhea improving, will defer GI consult for now  Continue to monitor  Continue supportive care    7/16 patient reports diarrhea improving, no nausea,fever or chills reported  Advance diet to full liquid microbial  Pt did report mild heart burn added Tums prn    7/19 Bloody diarrhea reported last evening , repeat plt was 26, Ordered 4 units of platelets   Plt this morning level 85   Hg  this morning dropped to 6.9, ordered 1 unit PRBCs for transfusion   Patient with Enteritis due to EPEC in the setting on thrombocytopenia, requiring intermittent plt transfusion, now who has developed bloody diarrhea.   Questioning if patient could be developing hemorrhagic enteritis,   Consulted  ID  to evaluated with recommendations   Discussed patient care with ID Dr Wise,  antibiotic stopped recommended to continue symptom management appreciate input    2.Acute neutropenia  Level today at 0.7  Continue neutropenic precaution  Oncology on consult  Monitor CBC    7/16 WBC level 0.6    7/17 WBC 0.6    7/18 WBC 0.9  Patient remains afebrile will start Neupogen per oncology recommendations    7/19 WBC 2.6 starting to improve    3. Thrombocytopenia: Per record patient is S/p 2 units of irradiated platelets on 7/13/24. Platelet level  7/14 was 21   Today platelet level at 8 no bleeding noted or reported  Ordered 4 units of platelet for transfusion   Per record consult  mild generalized tendernes non-distended with normal bowel sounds.  Musculoskeletal: passive and active ROM x 4 extremities.  Skin: Skin color, texture, turgor normal.  No rashes or lesions.  Neurologic:  Neurovascularly intact without any focal sensory/motor deficits.  Psychiatric: Alert and oriented, thought content appropriate, normal insight  Capillary Refill: Brisk,< 3 seconds   Peripheral Pulses: +2 palpable, equal bilaterally       Labs:   Recent Labs     07/18/24  0650 07/18/24  1642 07/19/24  0400   WBC 0.9* 0.9* 2.6*   HGB 8.1* 8.3* 6.9*   HCT 24.1* 24.5* 20.7*   PLT 30* 27* 88*       Recent Labs     07/17/24  0832 07/18/24  0650 07/19/24  0400    136 136   K 3.5 3.7 2.9*    101 99   CO2 23 22* 22*   BUN 10 8 6*   CREATININE 0.4 0.4 0.3*   CALCIUM 8.0* 7.7* 7.2*       No results for input(s): \"AST\", \"ALT\", \"BILIDIR\", \"BILITOT\", \"ALKPHOS\" in the last 72 hours.    No results for input(s): \"INR\" in the last 72 hours.  No results for input(s): \"CKTOTAL\", \"TROPONINI\" in the last 72 hours.    Urinalysis:      Lab Results   Component Value Date/Time    NITRU NEGATIVE 07/12/2024 03:00 PM    WBCUA 5-9 07/12/2024 03:00 PM    BACTERIA FEW 07/12/2024 03:00 PM    RBCUA 3-5 07/12/2024 03:00 PM    BLOODU SMALL 07/12/2024 03:00 PM    GLUCOSEU NEGATIVE 07/12/2024 03:00 PM       Radiology:  CT ABDOMEN PELVIS W IV CONTRAST Additional Contrast? None   Final Result   1. Moderate mucosal thickening is noted in the mid and distal small bowel loops   as well as diffuse colonic wall thickening suggesting enterocolitis. A small   amount of ascites is noted in the dependent portion of the pelvis. No fluid   collection or free air is observed. The small bowel loops in the lower   abdomen/pelvis are dilated measuring up to 3 cm in diameter and contain feculent   material suggesting stasis. No transition point is identified to suggest a   mechanical bowel obstruction.      2. Chronic findings are discussed.             **This report has been created using voice recognition software.  It may contain   minor errors which are inherent in voice recognition technology.**      Electronically signed by Dr. Caren Cadet      XR CHEST PORTABLE   Final Result   There is no acute intrathoracic process.               **This report has been created using voice recognition software. It may contain   minor errors which are inherent in voice recognition technology.**      Electronically signed by Dr Maykel Rod          Diet: ADULT DIET; Full Liquid; Low Microbial  ADULT ORAL NUTRITION SUPPLEMENT; Breakfast, Lunch, Dinner; Standard 4 oz Oral Supplement    DVT prophylaxis: [] Lovenox                                 [x] SCDs                                 [] SQ Heparin                                 [] Encourage ambulation           [] Already on Anticoagulation     Disposition:    [x] Home       [] TCU       [] Rehab       [] Psych       [] SNF       [] Long Term Care Facility       [] Other-    Code Status: Full Code    PT/OT Eval Status:         Electronically signed by RUT El CNP on 7/19/2024 at 1:28 PM

## 2024-07-19 NOTE — PROGRESS NOTES
[facility]  INPATIENT PHYSICAL THERAPY  DAILY NOTE  [facility] ONC MED 5K - 5K-06/006-A    Time In: 0756  Time Out: 0825  Timed Code Treatment Minutes: 29 Minutes  Minutes: 29          Date: 2024  Patient Name: Tiffany Baldwin,  Gender:  female        MRN: [id number]  : 1950  (74 y.o.)     Referring Practitioner: Joana Galvez APRN - CNP  Diagnosis: enteritis  Additional Pertinent Hx: Per EMR \"Patient is referred to the emergency department from outpatient oncology infusion center where she had presented for her chemotherapy treatment.  Patient reported nausea and vomiting and diarrhea.  She has had chills.  Reports her urine is very dark almost the color of blood.  Patient did not receive her chemo treatment and was instead referred to the emergency department for further evaluation of illness.\"     Prior Level of Function:  Lives With: Alone  Type of Home: House  Home Layout: One level  Home Access: Stairs to enter with rails  Entrance Stairs - Number of Steps: 3-4 RAMESH  Entrance Stairs - Rails: Right  Home Equipment: None   Bathroom Shower/Tub: Tub/Shower unit  Bathroom Toilet: Standard  Bathroom Accessibility: Accessible    Receives Help From: Family  ADL Assistance: Independent  Homemaking Assistance: Independent  Ambulation Assistance: Independent  Transfer Assistance: Independent  Active : Yes  IADL Comments: owns a dog and a cat  Additional Comments: daughter works days, son work nights \"they do what they can\" Pt uses no AD for mobility. Pt reports the last week she has has more trouble getting around and completing her IADL tasks.    Restrictions/Precautions:  Restrictions/Precautions: General Precautions, Fall Risk  Position Activity Restriction  Other position/activity restrictions: neutropenic, monitor hemoglobin     SUBJECTIVE: RN approved session. Patient laying in bed upon arrival and agreeable to therapy.     PAIN: denies    Vitals: Vitals not assessed per clinical  judgement, see nursing flowsheet    OBJECTIVE:  Bed Mobility:  Supine to Sit: Stand By Assistance, with head of bed raised  Sit to Supine: Stand By Assistance, with head of bed raised     Transfers:  Sit to Stand: Stand By Assistance, Contact Guard Assistance, X 1  Stand to Sit:Stand By Assistance    Ambulation:  Contact Guard Assistance  Distance: ~60ft  Surface: Level Tile  Device: Rolling Walker  Gait Deviations:  Forward Flexed Posture, Slow Alexa, Decreased Step Length Bilaterally, Decreased Gait Speed, Decreased Heel Strike Bilaterally, and fairly steady    Exercise:  Patient was guided in 1 set(s) 10 reps of exercise to both lower extremities.  Ankle pumps, Glut sets, Quad sets, Heelslides, Hip abduction/adduction, Straight leg raises, Seated marches, and Long arc quads.  Exercises were completed for increased independence with functional mobility.  *patient fatigues easily from activity.    Functional Outcome Measures:  [facility] (6 CLICK) BASIC MOBILITY  AM-PAC Inpatient Mobility Raw Score : 17  AM-PAC Inpatient T-Scale Score : 42.13  Mobility Inpatient CMS 0-100% Score: 50.57  Mobility Inpatient CMS G-Code Modifier : CK        Modified Charles Scale:  Not Applicable    ASSESSMENT:  Assessment: Patient progressing toward established goals.  Activity Tolerance:  Patient tolerance of  treatment: good.   Equipment Recommendations:Equipment Needed: Yes  Mobility Devices: Walker  Other: will need walker if returns home  Discharge Recommendations: Subacte/Skilled Nursing Facility  Plan: Current Treatment Recommendations: Strengthening, Balance training, Gait training, Neuromuscular re-education, Functional mobility training, Transfer training, Stair training, Home exercise program, Therapeutic activities, Safety education & training, Patient/Caregiver education & training, Endurance training, Equipment evaluation, education, & procurement  General Plan:  (5x GM)    Education:  Learners: Patient  Patient Education:

## 2024-07-19 NOTE — PROGRESS NOTES
Comprehensive Nutrition Assessment    Type and Reason for Visit:  Reassess    Nutrition Recommendations/Plan:   Consider appetite stimulant  Recommend continue nausea medications and magic mouthwash as needed  ONS: Ensure Compact TID  Continue diet per Provider and as GI status allows      Malnutrition Assessment:  Malnutrition Status:  Severe malnutrition (07/15/24 1141)    Context:  Acute Illness     Findings of the 6 clinical characteristics of malnutrition:  Energy Intake:  50% or less of estimated energy requirements for 5 or more days (Endorses very poor PO intake for 3 weeks)  Weight Loss:  Unable to assess (edema and fluctuations in EMR)     Body Fat Loss:  Mild body fat loss Orbital   Muscle Mass Loss:  Moderate muscle mass loss Temples (temporalis), Scapula (trapezius), Clavicles (pectoralis & deltoids)  Fluid Accumulation:  Mild Extremities   Strength:  Not Performed    Nutrition Assessment:     Pt. With no improvement from nutritional standpoint AEB poor appetite continues, intake remains less than 50% of meals. 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       Nutrition Related Findings:    Pt. Report/Treatments/Miscellaneous: Patient seen and spoke with RN- reports provided zofran prior to breakfast as patient struggles with nausea/ vomiting, patient reports having no appetite and after vomits after intake of few bites/ sips, intake of only bites for past 30 days per patient, agreeable to trial Ensure Compact instead of Ensure Clear;   note report on initial RD encounter 7/15/24: reports not eating more than a couple of bites for the last ~3 weeks r/t N/V and diarrhea, mouth sores (cannot afford magic mouth

## 2024-07-19 NOTE — CONSULTS
CONSULTATION NOTE :ID       Patient - Tiffany Baldwin,  Age - 74 y.o.    - 1950      Room Number - 5K-06/006-A   N -  [id number]   [facility] # - [id number]  Date of Admission -  2024 11:40 AM  Patient's PCP: Pooja Herrera APRN - CNP     Requesting Physician: Joana Galvez APRN - CNP    REASON FOR CONSULTATION   gastroenterities  CHIEF COMPLAINT     diarrhea  HISTORY OF PRESENT ILLNESS       This is a very pleasant 74 y.o. female who was admitted to the hospital with a chief complaints of generalized weakness and diarrhea. She was sent from the oncology clinic after she presented with diarrhea and weakness. She was on chemoradiation treatment for anal cancer. Her CT shows enterocolites and was neutropenic. Started on iv zosyn. The has poor appetite and has nausea, had blood in stool. Still has thrombocytopenia. Her stool was +ve for E.coli, enteropathogenic.blood cx was  negative. No contact with sick family member    PAST MEDICAL  HISTORY       Past Medical History:   Diagnosis Date    Anal cancer (HCC)     Breast cancer (HCC)     Hyperlipidemia     Hypertension        PAST SURGICAL HISTORY     Past Surgical History:   Procedure Laterality Date    ANUS SURGERY N/A 2024    Transrectal Excision Squamous Cell Carcinoma performed by Rene Zamudio DO at [facility] OR    BREAST LUMPECTOMY Left 2007    COLONOSCOPY  2024    Dr. Hernandez    COLONOSCOPY  2013    [facility] Dr. Hernandez    PORT SURGERY Left 2024    SINGLE LUMEN SMARTPORT INSERTION performed by Rene Zamudio DO at [facility] OR    TONSILLECTOMY           MEDICATIONS:       Scheduled Meds:   filgrastim/filgrastim biosimilar  480 mcg SubCUTAneous QPM    betamethasone valerate   Topical BID    amLODIPine  5 mg Oral Nightly    calcium elemental  1 tablet Oral BID    sodium chloride flush  5-40 mL IntraVENous 2 times per day    [Held by provider] enoxaparin  40 mg SubCUTAneous Daily     anal canal (HCC) C21.1    Enteritis K52.9    Severe malnutrition (HCC) E43    Thrombocytopenia (HCC) D69.6    Neutropenia (HCC) D70.9           Impression and Recommendation:   Neutropenic enterocolites: (typhlitis  )improving  Thrombocytopenia related to chemotherapy  Anemia due to above  Remains afebrile: will stop iv zoyn  Zofran as needed,   Encourage oral hydration with Gatorate       Thank you Joana Galvez, APRN - CNP for allowing me to participate in this patient's care.    Henrik Wise MD, 7/19/2024 9:10 AM

## 2024-07-19 NOTE — PROGRESS NOTES
[facility]   PROGRESS NOTE      Patient: Tiffany Baldwin  Room #: 5K-06/006-A            YOB: 1950  Age: 74 y.o.  Gender: female            Admit Date & Time: 7/12/2024 11:40 AM    Assessment:    The patient was asleep at the time of this attempted visit.     Interventions:  The patient was provided silent prayer.    Outcomes:  The patient remains sleeping at the end of the visit.     Plan:  1.Spiritual care will continue to follow the patient according to [facility] spiritual care SOP.       Electronically signed by Chaplain Jose, on 7/19/2024 at 1:24 PM.  Spiritual Care Department  [facility]  [phone]     07/19/24 1323   Encounter Summary   Encounter Overview/Reason Attempted Encounter   Service Provided For Patient   Referral/Consult From [facility]   Support System Children;Family members   Last Encounter  07/19/24   Complexity of Encounter Low   Begin Time 1240   End Time  1245   Total Time Calculated 5 min   Spiritual/Emotional needs   Type Spiritual Support   Assessment/Intervention/Outcome   Assessment Unable to assess   Intervention Prayer (assurance of)/[place]   Outcome Did not respond

## 2024-07-20 LAB
ANION GAP SERPL CALC-SCNC: 16 MEQ/L (ref 8–16)
BASOPHILS ABSOLUTE: 0 THOU/MM3 (ref 0–0.1)
BASOPHILS NFR BLD AUTO: 0 %
BUN SERPL-MCNC: 6 MG/DL (ref 7–22)
CALCIUM SERPL-MCNC: 7.5 MG/DL (ref 8.5–10.5)
CHLORIDE SERPL-SCNC: 101 MEQ/L (ref 98–111)
CO2 SERPL-SCNC: 20 MEQ/L (ref 23–33)
CREAT SERPL-MCNC: 0.4 MG/DL (ref 0.4–1.2)
DEPRECATED RDW RBC AUTO: 51.3 FL (ref 35–45)
DOHLE BOD BLD QL SMEAR: PRESENT
EOSINOPHIL NFR BLD AUTO: 0 %
EOSINOPHILS ABSOLUTE: 0 THOU/MM3 (ref 0–0.4)
ERYTHROCYTE [DISTWIDTH] IN BLOOD BY AUTOMATED COUNT: 16.8 % (ref 11.5–14.5)
GFR SERPL CREATININE-BSD FRML MDRD: > 90 ML/MIN/1.73M2
GLUCOSE BLD STRIP.AUTO-MCNC: 95 MG/DL (ref 70–108)
GLUCOSE SERPL-MCNC: 65 MG/DL (ref 70–108)
HCT VFR BLD AUTO: 27.6 % (ref 37–47)
HGB BLD-MCNC: 9.4 GM/DL (ref 12–16)
LYMPHOCYTES ABSOLUTE: 0.1 THOU/MM3 (ref 1–4.8)
LYMPHOCYTES NFR BLD AUTO: 4 %
MAGNESIUM SERPL-MCNC: 1.5 MG/DL (ref 1.6–2.4)
MAGNESIUM SERPL-MCNC: 2.2 MG/DL (ref 1.6–2.4)
MANUAL DIFF BLD: NORMAL
MCH RBC QN AUTO: 29.1 PG (ref 26–33)
MCHC RBC AUTO-ENTMCNC: 34.1 GM/DL (ref 32.2–35.5)
MCV RBC AUTO: 85.4 FL (ref 81–99)
METAMYELOCYTES: 2 %
MONOCYTES ABSOLUTE: 0.4 THOU/MM3 (ref 0.4–1.3)
MONOCYTES NFR BLD AUTO: 10 %
MYELOCYTES: 1 %
NEUTROPHILS ABSOLUTE: 2.9 THOU/MM3 (ref 1.8–7.7)
NEUTROPHILS NFR BLD AUTO: 83 %
NRBC BLD AUTO-RTO: 3 /100 WBC
PLATELET # BLD AUTO: 59 THOU/MM3 (ref 130–400)
PLATELET BLD QL SMEAR: ABNORMAL
PMV BLD AUTO: 10.4 FL (ref 9.4–12.4)
POIKILOCYTES: ABNORMAL
POTASSIUM SERPL-SCNC: 3.2 MEQ/L (ref 3.5–5.2)
RBC # BLD AUTO: 3.23 MILL/MM3 (ref 4.2–5.4)
SODIUM SERPL-SCNC: 137 MEQ/L (ref 135–145)
TOXIC GRANULES BLD QL SMEAR: PRESENT
WBC # BLD AUTO: 3.5 THOU/MM3 (ref 4.8–10.8)

## 2024-07-20 PROCEDURE — 6360000002 HC RX W HCPCS: Performed by: PHYSICIAN ASSISTANT

## 2024-07-20 PROCEDURE — 6370000000 HC RX 637 (ALT 250 FOR IP): Performed by: NURSE PRACTITIONER

## 2024-07-20 PROCEDURE — 1200000000 HC SEMI PRIVATE

## 2024-07-20 PROCEDURE — 6370000000 HC RX 637 (ALT 250 FOR IP): Performed by: STUDENT IN AN ORGANIZED HEALTH CARE EDUCATION/TRAINING PROGRAM

## 2024-07-20 PROCEDURE — 6370000000 HC RX 637 (ALT 250 FOR IP): Performed by: PHYSICIAN ASSISTANT

## 2024-07-20 PROCEDURE — 85025 COMPLETE CBC W/AUTO DIFF WBC: CPT

## 2024-07-20 PROCEDURE — 93005 ELECTROCARDIOGRAM TRACING: CPT | Performed by: PHYSICIAN ASSISTANT

## 2024-07-20 PROCEDURE — 83735 ASSAY OF MAGNESIUM: CPT

## 2024-07-20 PROCEDURE — 6360000002 HC RX W HCPCS: Performed by: NURSE PRACTITIONER

## 2024-07-20 PROCEDURE — 99232 SBSQ HOSP IP/OBS MODERATE 35: CPT | Performed by: PHYSICIAN ASSISTANT

## 2024-07-20 PROCEDURE — 82948 REAGENT STRIP/BLOOD GLUCOSE: CPT

## 2024-07-20 PROCEDURE — 80048 BASIC METABOLIC PNL TOTAL CA: CPT

## 2024-07-20 PROCEDURE — 2580000003 HC RX 258: Performed by: PHYSICIAN ASSISTANT

## 2024-07-20 RX ADMIN — Medication: at 08:53

## 2024-07-20 RX ADMIN — SODIUM CHLORIDE, PRESERVATIVE FREE 10 ML: 5 INJECTION INTRAVENOUS at 08:52

## 2024-07-20 RX ADMIN — ONDANSETRON 4 MG: 2 INJECTION INTRAMUSCULAR; INTRAVENOUS at 11:19

## 2024-07-20 RX ADMIN — SODIUM CHLORIDE, PRESERVATIVE FREE 10 ML: 5 INJECTION INTRAVENOUS at 21:33

## 2024-07-20 RX ADMIN — CALCIUM 500 MG: 500 TABLET ORAL at 08:52

## 2024-07-20 RX ADMIN — FILGRASTIM-AAFI 480 MCG: 480 INJECTION, SOLUTION SUBCUTANEOUS at 17:42

## 2024-07-20 RX ADMIN — POTASSIUM CHLORIDE 40 MEQ: 1500 TABLET, EXTENDED RELEASE ORAL at 05:22

## 2024-07-20 RX ADMIN — SODIUM CHLORIDE: 9 INJECTION, SOLUTION INTRAVENOUS at 21:36

## 2024-07-20 RX ADMIN — DICYCLOMINE HYDROCHLORIDE 20 MG: 10 CAPSULE ORAL at 17:42

## 2024-07-20 RX ADMIN — ONDANSETRON 4 MG: 2 INJECTION INTRAMUSCULAR; INTRAVENOUS at 05:40

## 2024-07-20 RX ADMIN — DICYCLOMINE HYDROCHLORIDE 20 MG: 10 CAPSULE ORAL at 21:33

## 2024-07-20 RX ADMIN — DICYCLOMINE HYDROCHLORIDE 20 MG: 10 CAPSULE ORAL at 11:19

## 2024-07-20 RX ADMIN — AMLODIPINE BESYLATE 5 MG: 5 TABLET ORAL at 21:34

## 2024-07-20 RX ADMIN — CALCIUM 500 MG: 500 TABLET ORAL at 21:33

## 2024-07-20 RX ADMIN — DICYCLOMINE HYDROCHLORIDE 20 MG: 10 CAPSULE ORAL at 05:24

## 2024-07-20 RX ADMIN — MAGNESIUM SULFATE HEPTAHYDRATE 2000 MG: 40 INJECTION, SOLUTION INTRAVENOUS at 05:20

## 2024-07-20 RX ADMIN — Medication: at 21:34

## 2024-07-20 RX ADMIN — SODIUM CHLORIDE: 9 INJECTION, SOLUTION INTRAVENOUS at 05:20

## 2024-07-20 RX ADMIN — ONDANSETRON 4 MG: 2 INJECTION INTRAMUSCULAR; INTRAVENOUS at 17:42

## 2024-07-20 ASSESSMENT — PAIN SCALES - GENERAL: PAINLEVEL_OUTOF10: 0

## 2024-07-20 NOTE — PROCEDURES
PROCEDURE NOTE  Date: 7/20/2024   Name: Tiffany CURRY Nicolasa  YOB: 1950    Procedures  EKG completed handed to RN

## 2024-07-20 NOTE — PROGRESS NOTES
Hospitalist Progress Note    [facility]      Unit/Bed:5K-06/006-A    YOB: 1950    MRN: [id number]       Acct: [id number]     PCP: Pooja Herrera APRN - CNP    Date of Admission: 7/12/2024    Assessment/Plan:    Enteritis:  due to Enteropathogenic E.Coli     -CT abdomen/ pelvis with IV contrast  done 7/12/24 showed: Moderate mucosal thickening is noted in the mid and distal small bowel loops  as well as diffuse colonic wall thickening suggesting enterocolitis   -ID consulted, IV Zosyn dc'd   -Consider GI consult   -Continue supportive care    -Anti-emetics PRN     2.Acute neutropenia   -Hematology consulted   -Continue Neutropenic cautions    -Continue Neupogen     3. Thrombocytopenia: Patient s/p 10 units of platelets, one unit of PRBC's so far this admission   -Hematology consulted   -Continue to trend   -SCD's for DVT prophylaxis     4.Primary Hypertension  -Continue Amlodipine with parameters     5.History of breast cancer: Noted   -Heme/Onc Consulted previously during admission     6.Squamous cell carcinoma of anal canal:  -Patient follows with [facility] oncology outpatient  -Patient s/p radiation therapy 7/16/24     7.Lactic acidosis now resolved     8.Hypokalemia:  -Replace per protocol     8.Normocytic anemia:   -Trend Hgb   -s/p 1 unit PRBC's this admission      LDA: []CVC / []PICC / []Midline / []Oseguera / []Drains / []Mediport / [x]None  Antibiotics: No  Steroids: No  Labs (still needed?): [x]Yes / []No  IVF (still needed?): []Yes / [x]No    Level of care: []Step Down / [x]Med-Surg  Bed Status: [x]Inpatient / []Observation  Telemetry: []Yes / [x]No  PT/OT: [x]Yes / []No    DVT Prophylaxis: [] Lovenox / [] Heparin / [x] SCDs / [] Already on Systemic Anticoagulation / [] None     Disposition:    [x] Home       [] TCU       [] Rehab       [] Psych       [] SNF       [] Long Term Care Facility       [] Other-    Chief Complaint: Diarrhea, chills       Subjective: 74  not intubated.   Neurological:      Mental Status: She is alert.   Psychiatric:         Speech: She is communicative.        Labs:   Recent Labs     07/18/24  1642 07/19/24  0400 07/19/24  1834 07/20/24  0403   WBC 0.9* 2.6*  --  3.5*   HGB 8.3* 6.9* 10.0* 9.4*   HCT 24.5* 20.7* 29.5* 27.6*   PLT 27* 88*  --  59*     Recent Labs     07/18/24  0650 07/19/24  0400 07/19/24  1834 07/20/24  0403    136  --  137   K 3.7 2.9* 3.9 3.2*    99  --  101   CO2 22* 22*  --  20*   BUN 8 6*  --  6*   CREATININE 0.4 0.3*  --  0.4   CALCIUM 7.7* 7.2*  --  7.5*     No results for input(s): \"AST\", \"ALT\", \"BILIDIR\", \"BILITOT\", \"ALKPHOS\" in the last 72 hours.  No results for input(s): \"INR\" in the last 72 hours.  No results for input(s): \"CKTOTAL\", \"TROPONINI\" in the last 72 hours.    Urinalysis:      Lab Results   Component Value Date/Time    NITRU NEGATIVE 07/12/2024 03:00 PM    WBCUA 5-9 07/12/2024 03:00 PM    BACTERIA FEW 07/12/2024 03:00 PM    RBCUA 3-5 07/12/2024 03:00 PM    BLOODU SMALL 07/12/2024 03:00 PM    GLUCOSEU NEGATIVE 07/12/2024 03:00 PM       Radiology:  CT ABDOMEN PELVIS W IV CONTRAST Additional Contrast? None   Final Result   1. Moderate mucosal thickening is noted in the mid and distal small bowel loops   as well as diffuse colonic wall thickening suggesting enterocolitis. A small   amount of ascites is noted in the dependent portion of the pelvis. No fluid   collection or free air is observed. The small bowel loops in the lower   abdomen/pelvis are dilated measuring up to 3 cm in diameter and contain feculent   material suggesting stasis. No transition point is identified to suggest a   mechanical bowel obstruction.      2. Chronic findings are discussed.            **This report has been created using voice recognition software.  It may contain   minor errors which are inherent in voice recognition technology.**      Electronically signed by Dr. Caren Cadet      XR CHEST PORTABLE   Final Result

## 2024-07-20 NOTE — PLAN OF CARE
Problem: Pain  Goal: Verbalizes/displays adequate comfort level or baseline comfort level  Outcome: Progressing  Flowsheets  Verbalizes/displays adequate comfort level or baseline comfort level:   Encourage patient to monitor pain and request assistance   Assess pain using appropriate pain scale   Administer analgesics based on type and severity of pain and evaluate response   Implement non-pharmacological measures as appropriate and evaluate response     Problem: Infection - Adult  Goal: Absence of fever/infection during anticipated neutropenic period  Outcome: Progressing  Absence of fever/infection during anticipated neutropenic period: Monitor white blood cell count     Problem: Hematologic - Adult  Goal: Maintains hematologic stability  Outcome: Progressing  Maintains hematologic stability:   Assess for signs and symptoms of bleeding or hemorrhage   Monitor labs for bleeding or clotting disorders   Administer blood products/factors as ordered     Problem: Genitourinary - Adult  Goal: Absence of urinary retention  Outcome: Progressing     Problem: Gastrointestinal - Adult  Goal: Maintains or returns to baseline bowel function  Outcome: Progressing     Problem: Discharge Planning  Goal: Discharge to home or other facility with appropriate resources  Outcome: Progressing     Care plan reviewed with the patient. Patient verbalized understanding of the care plan

## 2024-07-20 NOTE — PLAN OF CARE
Problem: Discharge Planning  Goal: Discharge to home or other facility with appropriate resources  Outcome: Progressing  Flowsheets (Taken 7/20/2024 1652)  Discharge to home or other facility with appropriate resources:   Identify barriers to discharge with patient and caregiver   Identify discharge learning needs (meds, wound care, etc)   Refer to discharge planning if patient needs post-hospital services based on physician order or complex needs related to functional status, cognitive ability or social support system   Arrange for needed discharge resources and transportation as appropriate     Problem: Safety - Adult  Goal: Free from fall injury  Outcome: Progressing  Flowsheets (Taken 7/20/2024 1652)  Free From Fall Injury: Instruct family/caregiver on patient safety     Problem: Skin/Tissue Integrity  Goal: Absence of new skin breakdown  Description: 1.  Monitor for areas of redness and/or skin breakdown  2.  Assess vascular access sites hourly  3.  Every 4-6 hours minimum:  Change oxygen saturation probe site  4.  Every 4-6 hours:  If on nasal continuous positive airway pressure, respiratory therapy assess nares and determine need for appliance change or resting period.  Outcome: Progressing     Problem: Musculoskeletal - Adult  Goal: Return ADL status to a safe level of function  Outcome: Progressing  Flowsheets (Taken 7/20/2024 1652)  Return ADL Status to a Safe Level of Function:   Administer medication as ordered   Assess activities of daily living deficits and provide assistive devices as needed     Problem: Gastrointestinal - Adult  Goal: Maintains or returns to baseline bowel function  Outcome: Progressing  Flowsheets (Taken 7/20/2024 1652)  Maintains or returns to baseline bowel function:   Assess bowel function   Administer IV fluids as ordered to ensure adequate hydration   Encourage mobilization and activity   Administer ordered medications as needed   Encourage oral fluids to ensure adequate

## 2024-07-21 LAB
ANION GAP SERPL CALC-SCNC: 13 MEQ/L (ref 8–16)
BUN SERPL-MCNC: 6 MG/DL (ref 7–22)
CALCIUM SERPL-MCNC: 7.9 MG/DL (ref 8.5–10.5)
CHLORIDE SERPL-SCNC: 94 MEQ/L (ref 98–111)
CO2 SERPL-SCNC: 24 MEQ/L (ref 23–33)
CREAT SERPL-MCNC: 0.5 MG/DL (ref 0.4–1.2)
EKG ATRIAL RATE: 128 BPM
EKG P AXIS: 9 DEGREES
EKG P-R INTERVAL: 92 MS
EKG Q-T INTERVAL: 314 MS
EKG QRS DURATION: 82 MS
EKG QTC CALCULATION (BAZETT): 458 MS
EKG R AXIS: 16 DEGREES
EKG T AXIS: 19 DEGREES
EKG VENTRICULAR RATE: 128 BPM
GFR SERPL CREATININE-BSD FRML MDRD: > 90 ML/MIN/1.73M2
GLUCOSE SERPL-MCNC: 77 MG/DL (ref 70–108)
POTASSIUM SERPL-SCNC: 3.4 MEQ/L (ref 3.5–5.2)
SODIUM SERPL-SCNC: 131 MEQ/L (ref 135–145)

## 2024-07-21 PROCEDURE — 6370000000 HC RX 637 (ALT 250 FOR IP): Performed by: PHYSICIAN ASSISTANT

## 2024-07-21 PROCEDURE — 2580000003 HC RX 258: Performed by: PHYSICIAN ASSISTANT

## 2024-07-21 PROCEDURE — 99232 SBSQ HOSP IP/OBS MODERATE 35: CPT | Performed by: PHYSICIAN ASSISTANT

## 2024-07-21 PROCEDURE — 93010 ELECTROCARDIOGRAM REPORT: CPT | Performed by: INTERNAL MEDICINE

## 2024-07-21 PROCEDURE — 80048 BASIC METABOLIC PNL TOTAL CA: CPT

## 2024-07-21 PROCEDURE — 6370000000 HC RX 637 (ALT 250 FOR IP): Performed by: STUDENT IN AN ORGANIZED HEALTH CARE EDUCATION/TRAINING PROGRAM

## 2024-07-21 PROCEDURE — 1200000000 HC SEMI PRIVATE

## 2024-07-21 PROCEDURE — 6370000000 HC RX 637 (ALT 250 FOR IP): Performed by: NURSE PRACTITIONER

## 2024-07-21 PROCEDURE — 6360000002 HC RX W HCPCS: Performed by: NURSE PRACTITIONER

## 2024-07-21 PROCEDURE — 6360000002 HC RX W HCPCS: Performed by: PHYSICIAN ASSISTANT

## 2024-07-21 RX ORDER — SCOLOPAMINE TRANSDERMAL SYSTEM 1 MG/1
1 PATCH, EXTENDED RELEASE TRANSDERMAL
Status: DISCONTINUED | OUTPATIENT
Start: 2024-07-21 | End: 2024-07-25 | Stop reason: HOSPADM

## 2024-07-21 RX ORDER — LACTOBACILLUS RHAMNOSUS GG 10B CELL
1 CAPSULE ORAL
Status: DISCONTINUED | OUTPATIENT
Start: 2024-07-22 | End: 2024-07-25 | Stop reason: HOSPADM

## 2024-07-21 RX ADMIN — SODIUM CHLORIDE: 9 INJECTION, SOLUTION INTRAVENOUS at 20:43

## 2024-07-21 RX ADMIN — CALCIUM 500 MG: 500 TABLET ORAL at 20:44

## 2024-07-21 RX ADMIN — SODIUM CHLORIDE, PRESERVATIVE FREE 10 ML: 5 INJECTION INTRAVENOUS at 08:45

## 2024-07-21 RX ADMIN — FILGRASTIM-AAFI 480 MCG: 480 INJECTION, SOLUTION SUBCUTANEOUS at 18:02

## 2024-07-21 RX ADMIN — ONDANSETRON 4 MG: 2 INJECTION INTRAMUSCULAR; INTRAVENOUS at 08:45

## 2024-07-21 RX ADMIN — DICYCLOMINE HYDROCHLORIDE 20 MG: 10 CAPSULE ORAL at 18:01

## 2024-07-21 RX ADMIN — DICYCLOMINE HYDROCHLORIDE 20 MG: 10 CAPSULE ORAL at 12:15

## 2024-07-21 RX ADMIN — Medication: at 08:44

## 2024-07-21 RX ADMIN — Medication: at 20:44

## 2024-07-21 RX ADMIN — DICYCLOMINE HYDROCHLORIDE 20 MG: 10 CAPSULE ORAL at 20:44

## 2024-07-21 RX ADMIN — AMLODIPINE BESYLATE 5 MG: 5 TABLET ORAL at 20:44

## 2024-07-21 RX ADMIN — CALCIUM 500 MG: 500 TABLET ORAL at 08:45

## 2024-07-21 RX ADMIN — DICYCLOMINE HYDROCHLORIDE 20 MG: 10 CAPSULE ORAL at 05:41

## 2024-07-21 ASSESSMENT — PAIN SCALES - GENERAL
PAINLEVEL_OUTOF10: 0
PAINLEVEL_OUTOF10: 0

## 2024-07-21 NOTE — PROGRESS NOTES
Hospitalist Progress Note    Patient:  Tiffany CURRY [facility]      Unit/Bed:5K-06/006-A    YOB: 1950    MRN: [id number]       Acct: [id number]     PCP: Pooja Herrera APRN - CNP    Date of Admission: 7/12/2024    Assessment/Plan:    Enteritis:  due to Enteropathogenic E.Coli     -CT abdomen/ pelvis with IV contrast  done 7/12/24 showed: Moderate mucosal thickening is noted in the mid and distal small bowel loops  as well as diffuse colonic wall thickening suggesting enterocolitis   -ID consulted, IV Zosyn dc'd   -Consider GI consult   -Continue supportive care    -Anti-emetics PRN   -Scopolamine patch ordered     2.Acute neutropenia   -Hematology consulted   -Continue Neutropenic cautions    -Continue Neupogen     3. Thrombocytopenia: Patient s/p 10 units of platelets, one unit of PRBC's so far this admission   -Hematology consulted   -Continue to trend   -SCD's for DVT prophylaxis     4.Primary Hypertension  -Continue Amlodipine with parameters     5.History of breast cancer: Noted   -Heme/Onc Consulted previously during admission     6.Squamous cell carcinoma of anal canal:  -Patient follows with [facility]ology outpatient  -Patient s/p radiation therapy 7/16/24     7.Lactic acidosis now resolved     8.Hypokalemia:  -Replace per protocol     8.Normocytic anemia:   -Trend Hgb   -s/p 1 unit PRBC's this admission      LDA: []CVC / []PICC / []Midline / []Oseguera / []Drains / []Mediport / [x]None  Antibiotics: No  Steroids: No  Labs (still needed?): [x]Yes / []No  IVF (still needed?): []Yes / [x]No    Level of care: []Step Down / [x]Med-Surg  Bed Status: [x]Inpatient / []Observation  Telemetry: []Yes / [x]No  PT/OT: [x]Yes / []No    DVT Prophylaxis: [] Lovenox / [] Heparin / [x] SCDs / [] Already on Systemic Anticoagulation / [] None     Disposition:    [x] Home       [] TCU       [] Rehab       [] Psych       [] SNF       [] Long Term Care Facility       [] Other-    Chief Complaint: Diarrhea,  chills       Subjective: 74 y.o. female admitted to the hospitalist service for diarrhea and chills. Patient reports feeling weak. She reports she vomited yesterday. She endorses nausea but denies vomiting today. She denies chest pain.    Medications:    Infusion Medications    sodium chloride      sodium chloride      sodium chloride      sodium chloride      sodium chloride      sodium chloride 20 mL/hr at 07/20/24 2136     Scheduled Medications    dicyclomine  20 mg Oral 4x Daily AC & HS    filgrastim/filgrastim biosimilar  480 mcg SubCUTAneous QPM    betamethasone valerate   Topical BID    amLODIPine  5 mg Oral Nightly    calcium elemental  1 tablet Oral BID    sodium chloride flush  5-40 mL IntraVENous 2 times per day    [Held by provider] enoxaparin  40 mg SubCUTAneous Daily     PRN Meds: sodium chloride, prochlorperazine, sodium chloride, magic (miracle) mouthwash, sodium chloride, potassium chloride **OR** potassium chloride, calcium carbonate, sodium chloride, sodium chloride, sodium phosphate 15 mmol in sodium chloride 0.9 % 250 mL IVPB, calcium gluconate, prochlorperazine, sodium chloride flush, sodium chloride, potassium chloride **OR** potassium alternative oral replacement **OR** potassium chloride, magnesium sulfate, ondansetron **OR** ondansetron, polyethylene glycol, acetaminophen **OR** acetaminophen      Intake/Output Summary (Last 24 hours) at 7/21/2024 1021  Last data filed at 7/20/2024 2130  Gross per 24 hour   Intake 250 ml   Output --   Net 250 ml         Diet:  ADULT DIET; Full Liquid; Low Microbial  ADULT ORAL NUTRITION SUPPLEMENT; Breakfast, Lunch, Dinner; Standard 4 oz Oral Supplement    Exam:  /87   Pulse (!) 105   Temp 98 °F (36.7 °C) (Oral)   Resp 18   Ht 1.524 m (5')   Wt 69.7 kg (153 lb 10.6 oz)   SpO2 97%   BMI 30.01 kg/m²     Physical Exam  Constitutional:       Interventions: She is not intubated.  Pulmonary:      Effort: She is not intubated.   Neurological:

## 2024-07-21 NOTE — PROGRESS NOTES
Progress note: Infectious diseases    Patient - Tiffany Baldwin,  Age - 74 y.o.    - 1950      Room Number - 5K-06/006-A   N -  [id number]   [facility] # - [id number]  Date of Admission -  2024 11:40 AM    SUBJECTIVE:   She is feeling better, no fever, has still loose stool no pain  OBJECTIVE   VITALS    height is 1.524 m (5') and weight is 69.7 kg (153 lb 10.6 oz). Her oral temperature is 98 °F (36.7 °C). Her blood pressure is 137/87 and her pulse is 105 (abnormal). Her respiration is 18 and oxygen saturation is 97%.       Wt Readings from Last 3 Encounters:   24 69.7 kg (153 lb 10.6 oz)   24 65.3 kg (144 lb)   24 65.1 kg (143 lb 8 oz)       I/O (24 Hours)    Intake/Output Summary (Last 24 hours) at 2024 1250  Last data filed at 2024 2130  Gross per 24 hour   Intake 100 ml   Output --   Net 100 ml       General Appearance  Awake, alert, oriented,  not  In acute distress  HEENT - normocephalic, atraumatic, pale  conjunctiva,  anicteric sclera  Neck - Supple, no mass  Lungs -  Bilateral  air entry, no rhonchi, no wheeze  Cardiovascular - Heart sounds are normal.    Abdomen - soft, not distended, nontender,   Neurologic -oriented  Skin - No bruising or bleeding  Extremities - No edema, no cyanosis, clubbing     MEDICATIONS:      dicyclomine  20 mg Oral 4x Daily AC & HS    filgrastim/filgrastim biosimilar  480 mcg SubCUTAneous QPM    betamethasone valerate   Topical BID    amLODIPine  5 mg Oral Nightly    calcium elemental  1 tablet Oral BID    sodium chloride flush  5-40 mL IntraVENous 2 times per day    [Held by provider] enoxaparin  40 mg SubCUTAneous Daily      sodium chloride      sodium chloride      sodium chloride      sodium chloride      sodium chloride      sodium chloride 20 mL/hr at 24     sodium chloride, prochlorperazine, sodium chloride, magic (miracle)  Squamous cell carcinoma of anal canal (HCC) C21.1    Enteritis K52.9    Severe malnutrition (HCC) E43    Thrombocytopenia (HCC) D69.6    Neutropenia (HCC) D70.9         ASSESSMENT/PLAN   Neutropenic enterocolites: improving  Neutropenia better  Continue current treatment      Henrik Wise MD, 7/21/2024 12:50 PM

## 2024-07-22 LAB
BASOPHILS ABSOLUTE: 0.1 THOU/MM3 (ref 0–0.1)
BASOPHILS NFR BLD AUTO: 1.1 %
DEPRECATED RDW RBC AUTO: 49.6 FL (ref 35–45)
EOSINOPHIL NFR BLD AUTO: 0 %
EOSINOPHILS ABSOLUTE: 0 THOU/MM3 (ref 0–0.4)
ERYTHROCYTE [DISTWIDTH] IN BLOOD BY AUTOMATED COUNT: 16.3 % (ref 11.5–14.5)
HCT VFR BLD AUTO: 27.7 % (ref 37–47)
HGB BLD-MCNC: 9.3 GM/DL (ref 12–16)
IMM GRANULOCYTES # BLD AUTO: 0.83 THOU/MM3 (ref 0–0.07)
IMM GRANULOCYTES NFR BLD AUTO: 18.1 %
LYMPHOCYTES ABSOLUTE: 0.4 THOU/MM3 (ref 1–4.8)
LYMPHOCYTES NFR BLD AUTO: 9.6 %
MCH RBC QN AUTO: 29.1 PG (ref 26–33)
MCHC RBC AUTO-ENTMCNC: 33.6 GM/DL (ref 32.2–35.5)
MCV RBC AUTO: 86.6 FL (ref 81–99)
MONOCYTES ABSOLUTE: 0.8 THOU/MM3 (ref 0.4–1.3)
MONOCYTES NFR BLD AUTO: 16.8 %
NEUTROPHILS ABSOLUTE: 2.5 THOU/MM3 (ref 1.8–7.7)
NEUTROPHILS NFR BLD AUTO: 54.4 %
NRBC BLD AUTO-RTO: 1 /100 WBC
PLATELET # BLD AUTO: 36 THOU/MM3 (ref 130–400)
PLATELET BLD QL SMEAR: ABNORMAL
PMV BLD AUTO: 10.7 FL (ref 9.4–12.4)
POLYCHROMASIA BLD QL SMEAR: ABNORMAL
POTASSIUM SERPL-SCNC: 4.1 MEQ/L (ref 3.5–5.2)
RBC # BLD AUTO: 3.2 MILL/MM3 (ref 4.2–5.4)
SCAN OF BLOOD SMEAR: NORMAL
TOXIC GRANULES BLD QL SMEAR: PRESENT
WBC # BLD AUTO: 4.6 THOU/MM3 (ref 4.8–10.8)

## 2024-07-22 PROCEDURE — 99233 SBSQ HOSP IP/OBS HIGH 50: CPT | Performed by: PHYSICIAN ASSISTANT

## 2024-07-22 PROCEDURE — 6370000000 HC RX 637 (ALT 250 FOR IP): Performed by: STUDENT IN AN ORGANIZED HEALTH CARE EDUCATION/TRAINING PROGRAM

## 2024-07-22 PROCEDURE — 1200000000 HC SEMI PRIVATE

## 2024-07-22 PROCEDURE — 97116 GAIT TRAINING THERAPY: CPT

## 2024-07-22 PROCEDURE — 6360000002 HC RX W HCPCS: Performed by: PHYSICIAN ASSISTANT

## 2024-07-22 PROCEDURE — 97110 THERAPEUTIC EXERCISES: CPT

## 2024-07-22 PROCEDURE — 6370000000 HC RX 637 (ALT 250 FOR IP): Performed by: PHYSICIAN ASSISTANT

## 2024-07-22 PROCEDURE — 6370000000 HC RX 637 (ALT 250 FOR IP)

## 2024-07-22 PROCEDURE — 84132 ASSAY OF SERUM POTASSIUM: CPT

## 2024-07-22 PROCEDURE — 85025 COMPLETE CBC W/AUTO DIFF WBC: CPT

## 2024-07-22 PROCEDURE — 6370000000 HC RX 637 (ALT 250 FOR IP): Performed by: NURSE PRACTITIONER

## 2024-07-22 RX ORDER — LOPERAMIDE HYDROCHLORIDE 2 MG/1
2 CAPSULE ORAL EVERY 6 HOURS
Status: DISCONTINUED | OUTPATIENT
Start: 2024-07-22 | End: 2024-07-25 | Stop reason: HOSPADM

## 2024-07-22 RX ADMIN — Medication 1 CAPSULE: at 09:00

## 2024-07-22 RX ADMIN — AMLODIPINE BESYLATE 5 MG: 5 TABLET ORAL at 20:42

## 2024-07-22 RX ADMIN — CALCIUM 500 MG: 500 TABLET ORAL at 09:00

## 2024-07-22 RX ADMIN — CALCIUM 500 MG: 500 TABLET ORAL at 20:42

## 2024-07-22 RX ADMIN — DICYCLOMINE HYDROCHLORIDE 20 MG: 10 CAPSULE ORAL at 05:15

## 2024-07-22 RX ADMIN — ONDANSETRON 4 MG: 2 INJECTION INTRAMUSCULAR; INTRAVENOUS at 03:01

## 2024-07-22 RX ADMIN — LOPERAMIDE HYDROCHLORIDE 2 MG: 2 CAPSULE ORAL at 23:12

## 2024-07-22 RX ADMIN — LOPERAMIDE HYDROCHLORIDE 2 MG: 2 CAPSULE ORAL at 18:14

## 2024-07-22 RX ADMIN — Medication: at 20:42

## 2024-07-22 RX ADMIN — Medication: at 09:01

## 2024-07-22 RX ADMIN — DICYCLOMINE HYDROCHLORIDE 20 MG: 10 CAPSULE ORAL at 20:42

## 2024-07-22 RX ADMIN — DICYCLOMINE HYDROCHLORIDE 20 MG: 10 CAPSULE ORAL at 10:42

## 2024-07-22 RX ADMIN — DICYCLOMINE HYDROCHLORIDE 20 MG: 10 CAPSULE ORAL at 18:14

## 2024-07-22 RX ADMIN — LOPERAMIDE HYDROCHLORIDE 2 MG: 2 CAPSULE ORAL at 10:42

## 2024-07-22 ASSESSMENT — PAIN SCALES - GENERAL: PAINLEVEL_OUTOF10: 0

## 2024-07-22 NOTE — PLAN OF CARE
Problem: Discharge Planning  Goal: Discharge to home or other facility with appropriate resources  7/22/2024 1146 by Hans Pedersen RN  Outcome: Progressing     Problem: Safety - Adult  Goal: Free from fall injury  7/22/2024 1146 by Hans Pedersen RN  Outcome: Progressing     Problem: Skin/Tissue Integrity  Goal: Absence of new skin breakdown  Description: 1.  Monitor for areas of redness and/or skin breakdown  2.  Assess vascular access sites hourly  3.  Every 4-6 hours minimum:  Change oxygen saturation probe site  4.  Every 4-6 hours:  If on nasal continuous positive airway pressure, respiratory therapy assess nares and determine need for appliance change or resting period.  7/22/2024 1146 by Hans Pedersen RN  Outcome: Progressing     Problem: Musculoskeletal - Adult  Goal: Return ADL status to a safe level of function  7/22/2024 1146 by Hans Pedersen RN  Outcome: Progressing     Problem: Gastrointestinal - Adult  Goal: Maintains or returns to baseline bowel function  7/22/2024 1146 by Hans Pedersen RN  Outcome: Progressing     Problem: Pain  Goal: Verbalizes/displays adequate comfort level or baseline comfort level  7/22/2024 1146 by Hans Pedersen RN  Outcome: Progressing     Problem: Skin/Tissue Integrity - Adult  Goal: Incisions, wounds, or drain sites healing without S/S of infection  7/22/2024 1146 by Hans Pedersen RN  Outcome: Progressing     Problem: Genitourinary - Adult  Goal: Absence of urinary retention  7/22/2024 1146 by Hans Pedersen RN  Outcome: Progressing     Problem: Hematologic - Adult  Goal: Maintains hematologic stability  7/22/2024 1146 by Hans Pedersen RN  Outcome: Progressing     Problem: Nutrition Deficit:  Goal: Optimize nutritional status  7/22/2024 1146 by Hans Pedersen RN  Outcome: Progressing     Problem: Neurosensory - Adult  Goal: Achieves maximal functionality and self care  7/22/2024 1146 by Hans Pedersen RN  Outcome: Progressing     Problem: Respiratory - Adult  Goal:

## 2024-07-22 NOTE — PROGRESS NOTES
ID Progress Note    Patient - Tiffany Baldwin,  Age - 74 y.o.    - 1950      Room Number - 5K-06/006-A   MRN -  [id number]   [id number]  Date of Admission -  2024 11:40 AM    SUBJECTIVE:      Pt feeling much better today, states she feels much less \"blah\". However she is still having liquid green diarrhea that has not improved much. Still unable to hold down much food or liquids. Endorsing minimal epigastric abdominal pain but only with deep palpation. Denies fevers, chills, CP, SOB, and urinary symptoms.   OBJECTIVE   VITALS    height is 1.524 m (5') and weight is 69.7 kg (153 lb 10.6 oz). Her oral temperature is 97.9 °F (36.6 °C). Her blood pressure is 131/81 and her pulse is 111 (abnormal). Her respiration is 18 and oxygen saturation is 95%.       Wt Readings from Last 3 Encounters:   24 69.7 kg (153 lb 10.6 oz)   24 65.3 kg (144 lb)   24 65.1 kg (143 lb 8 oz)       I/O (24 Hours)    Intake/Output Summary (Last 24 hours) at 2024 0824  Last data filed at 2024 0515  Gross per 24 hour   Intake 640 ml   Output --   Net 640 ml       General Appearance  Awake, alert, oriented,  not  In acute distress  HEENT - normocephalic, atraumatic, pink conjunctiva,  anicteric sclera  Neck - Supple, no mass  Lungs -  Bilateral good air entry, no rhonchi, no wheeze  Cardiovascular - Heart sounds are normal.  Regular rate and rhythm without murmur, gallop or rub.  Abdomen - soft, not distended, minimal epigastric tenderness w/ deep palpation, no organomegally,  Neurologic - moving all extremities. CN II-XII intact bilaterally.   Skin - No bruising or bleeding  Extremities - No edema, no cyanosis, clubbing     MEDICATIONS:      scopolamine  1 patch TransDERmal Q72H    lactobacillus  1 capsule Oral Daily with breakfast    dicyclomine  20 mg Oral 4x Daily AC & HS    filgrastim/filgrastim biosimilar  480 mcg SubCUTAneous QPM    betamethasone valerate   Topical BID     chart, progress notes, labs and radiographs were reviewed.   Case discussed with the nurse. Questions and concerns were addressed.  I agree with the progress note.

## 2024-07-22 NOTE — PROGRESS NOTES
[place]  INPATIENT PHYSICAL THERAPY  DAILY NOTE  [facility] ONC MED 5K - 5K-06/006-A    Time In: 1438  Time Out: 1503  Timed Code Treatment Minutes: 25 Minutes  Minutes: 25          Date: 2024  Patient Name: Tiffany Baldwin,  Gender:  female        MRN: [id number]  : 1950  (74 y.o.)     Referring Practitioner: Joana Galvez APRN - CNP  Diagnosis: enteritis  Additional Pertinent Hx: Per EMR \"Patient is referred to the emergency department from outpatient oncology infusion center where she had presented for her chemotherapy treatment.  Patient reported nausea and vomiting and diarrhea.  She has had chills.  Reports her urine is very dark almost the color of blood.  Patient did not receive her chemo treatment and was instead referred to the emergency department for further evaluation of illness.\"     Prior Level of Function:  Lives With: Alone  Type of Home: House  Home Layout: One level  Home Access: Stairs to enter with rails  Entrance Stairs - Number of Steps: 3-4 RAMESH  Entrance Stairs - Rails: Right  Home Equipment: None   Bathroom Shower/Tub: Tub/Shower unit  Bathroom Toilet: Standard  Bathroom Accessibility: Accessible    Receives Help From: Family  ADL Assistance: Independent  Homemaking Assistance: Independent  Ambulation Assistance: Independent  Transfer Assistance: Independent  Active : Yes  IADL Comments: owns a dog and a cat  Additional Comments: daughter works days, son work nights \"they do what they can\" Pt uses no AD for mobility. Pt reports the last week she has has more trouble getting around and completing her IADL tasks.    Restrictions/Precautions:  Restrictions/Precautions: General Precautions, Fall Risk  Position Activity Restriction  Other position/activity restrictions: neutropenic, monitor hemoglobin     SUBJECTIVE: RN ok'ed session. Patient was sleeping in bed upon arrival with max verbal cues and tactile cues required due to patient [place]. Patient was  motivated to complete session.     PAIN: denies pain     Vitals: Vitals not assessed per clinical judgement, see nursing flowsheet    OBJECTIVE:  Bed Mobility:  Supine to Sit: Stand By Assistance, X 1, with head of bed raised slightly, with rail  Sit to Supine: Stand By Assistance, X 1, with head of bed raised slightly, with rail     Transfers:  Sit to Stand: Contact Guard Assistance, X 1  Stand to Sit:Contact Guard Assistance, X 1    Ambulation:  Contact Guard Assistance, Minimal Assistance, X 1, with cues for safety, with verbal cues , with increased time for completion  Distance: 50'x1, 10'x2   Surface: Level Tile  Device: Rolling Walker  Gait Deviations:  Forward Flexed Posture, Decreased Step Length Bilaterally, Decreased Gait Speed, Decreased Heel Strike Bilaterally, Mild Path Deviations, Unsteady Gait, Decreased Terminal Knee Extension, and Increased reliance on assistive device  *pt demo unsteadiness at times with cues required for staying inside of RW as patient demo fast pace and decreased safety awareness   Balance:  Static Sitting Balance:  Stand By Assistance  Static Standing Balance: Contact Guard Assistance  Dynamic Standing Balance: Contact Guard Assistance, Minimal Assistance  *Pt required use of restroom twice during session. Patient required CGAx1 for sit to stand from toileted surface with independence for pericare and close SBA/CGA for hand hygiene at sink.   Exercise:  Patient was guided in 1 set(s) 7-10 reps of exercise to both lower extremities.  Quad sets, Heelslides, Short arc quads, Hip abduction/adduction, and Straight leg raises.  Exercises were completed for increased independence with functional mobility.  *Pt demo increased fatigue and frequent rest breaks during session.   Functional Outcome Measures:  [facility] (6 CLICK) BASIC MOBILITY  AM-PAC Inpatient Mobility Raw Score : 17  AM-PAC Inpatient T-Scale Score : 42.13  Mobility Inpatient CMS 0-100% Score: 50.57  Mobility Inpatient CMS

## 2024-07-22 NOTE — PROGRESS NOTES
Hospitalist Progress Note    Patient:  Tiffany CURRY [facility]      Unit/Bed:5-06/006-A    YOB: 1950    MRN: [id number]       Acct: [id number]     PCP: Pooja Herrera APRN - CNP    Date of Admission: 7/12/2024    Assessment/Plan:    Enteritis:  due to Enteropathogenic E.Coli     -CT abdomen/ pelvis with IV contrast  done 7/12/24 showed: Moderate mucosal thickening is noted in the mid and distal small bowel loops  as well as diffuse colonic wall thickening suggesting enterocolitis   -ID consulted, IV Zosyn dc'd   -GI consulted 7/22/24, will see AM of 7/23   -Continue supportive care    -Anti-emetics PRN   -Scopolamine patch ordered   -Probiotics ordered   -Diet advanced to soft from full liquid     2.Acute neutropenia   -Hematology consulted   -Continue Neutropenic cautions    -ANC > 1000 for 3 days, okay to d/c Neupogen per pharmacy     3. Thrombocytopenia: Patient s/p 10 units of platelets, one unit of PRBC's so far this admission   -Hematology consulted   -Continue to trend   -SCD's for DVT prophylaxis     4.Primary Hypertension  -Continue Amlodipine with parameters     5.History of breast cancer: Noted   -Heme/Onc Consulted previously during admission     6.Squamous cell carcinoma of anal canal:  -Patient follows with [facility] oncology outpatient  -Patient s/p radiation therapy 7/16/24     7.Lactic acidosis now resolved     8.Hypokalemia:  -Replace per protocol     8.Normocytic anemia:   -Trend Hgb   -s/p 1 unit PRBC's this admission      LDA: []CVC / []PICC / []Midline / []Oseguera / []Drains / []Mediport / [x]None  Antibiotics: No  Steroids: No  Labs (still needed?): [x]Yes / []No  IVF (still needed?): []Yes / [x]No    Level of care: []Step Down / [x]Med-Surg  Bed Status: [x]Inpatient / []Observation  Telemetry: []Yes / [x]No  PT/OT: [x]Yes / []No    DVT Prophylaxis: [] Lovenox / [] Heparin / [x] SCDs / [] Already on Systemic Anticoagulation / [] None     Disposition:    [x] Home       []

## 2024-07-23 LAB
BASOPHILS ABSOLUTE: 0.1 THOU/MM3 (ref 0–0.1)
BASOPHILS NFR BLD AUTO: 1.1 %
C DIFF GDH STL QL: NEGATIVE
DEPRECATED RDW RBC AUTO: 49.4 FL (ref 35–45)
EOSINOPHIL NFR BLD AUTO: 0 %
EOSINOPHILS ABSOLUTE: 0 THOU/MM3 (ref 0–0.4)
ERYTHROCYTE [DISTWIDTH] IN BLOOD BY AUTOMATED COUNT: 16.1 % (ref 11.5–14.5)
HCT VFR BLD AUTO: 26.9 % (ref 37–47)
HGB BLD-MCNC: 9.1 GM/DL (ref 12–16)
IMM GRANULOCYTES # BLD AUTO: 1 THOU/MM3 (ref 0–0.07)
IMM GRANULOCYTES NFR BLD AUTO: 15.9 %
LYMPHOCYTES ABSOLUTE: 0.7 THOU/MM3 (ref 1–4.8)
LYMPHOCYTES NFR BLD AUTO: 10.5 %
MCH RBC QN AUTO: 29.5 PG (ref 26–33)
MCHC RBC AUTO-ENTMCNC: 33.8 GM/DL (ref 32.2–35.5)
MCV RBC AUTO: 87.3 FL (ref 81–99)
MONOCYTES ABSOLUTE: 1 THOU/MM3 (ref 0.4–1.3)
MONOCYTES NFR BLD AUTO: 15.9 %
NEUTROPHILS ABSOLUTE: 3.6 THOU/MM3 (ref 1.8–7.7)
NEUTROPHILS NFR BLD AUTO: 56.6 %
NRBC BLD AUTO-RTO: 1 /100 WBC
PLATELET # BLD AUTO: 40 THOU/MM3 (ref 130–400)
PLATELET BLD QL SMEAR: ABNORMAL
PMV BLD AUTO: 10.4 FL (ref 9.4–12.4)
POIKILOCYTES: ABNORMAL
POLYCHROMASIA BLD QL SMEAR: ABNORMAL
RBC # BLD AUTO: 3.08 MILL/MM3 (ref 4.2–5.4)
SCAN OF BLOOD SMEAR: NORMAL
TOXIC GRANULES BLD QL SMEAR: PRESENT
WBC # BLD AUTO: 6.3 THOU/MM3 (ref 4.8–10.8)

## 2024-07-23 PROCEDURE — 97110 THERAPEUTIC EXERCISES: CPT

## 2024-07-23 PROCEDURE — 2580000003 HC RX 258: Performed by: PHYSICIAN ASSISTANT

## 2024-07-23 PROCEDURE — 6370000000 HC RX 637 (ALT 250 FOR IP): Performed by: PHYSICIAN ASSISTANT

## 2024-07-23 PROCEDURE — 85025 COMPLETE CBC W/AUTO DIFF WBC: CPT

## 2024-07-23 PROCEDURE — 87045 FECES CULTURE AEROBIC BACT: CPT

## 2024-07-23 PROCEDURE — 97530 THERAPEUTIC ACTIVITIES: CPT

## 2024-07-23 PROCEDURE — 97116 GAIT TRAINING THERAPY: CPT

## 2024-07-23 PROCEDURE — 6370000000 HC RX 637 (ALT 250 FOR IP): Performed by: NURSE PRACTITIONER

## 2024-07-23 PROCEDURE — 6370000000 HC RX 637 (ALT 250 FOR IP)

## 2024-07-23 PROCEDURE — 87427 SHIGA-LIKE TOXIN AG IA: CPT

## 2024-07-23 PROCEDURE — 1200000000 HC SEMI PRIVATE

## 2024-07-23 PROCEDURE — 6370000000 HC RX 637 (ALT 250 FOR IP): Performed by: STUDENT IN AN ORGANIZED HEALTH CARE EDUCATION/TRAINING PROGRAM

## 2024-07-23 PROCEDURE — 86645 CMV ANTIBODY IGM: CPT

## 2024-07-23 PROCEDURE — 99233 SBSQ HOSP IP/OBS HIGH 50: CPT | Performed by: PHYSICIAN ASSISTANT

## 2024-07-23 PROCEDURE — 87449 NOS EACH ORGANISM AG IA: CPT

## 2024-07-23 RX ORDER — BUDESONIDE 3 MG/1
9 CAPSULE, COATED PELLETS ORAL DAILY
Status: DISCONTINUED | OUTPATIENT
Start: 2024-07-23 | End: 2024-07-25 | Stop reason: HOSPADM

## 2024-07-23 RX ADMIN — Medication 1 CAPSULE: at 09:58

## 2024-07-23 RX ADMIN — DICYCLOMINE HYDROCHLORIDE 20 MG: 10 CAPSULE ORAL at 11:41

## 2024-07-23 RX ADMIN — BUDESONIDE 9 MG: 3 CAPSULE, GELATIN COATED ORAL at 17:40

## 2024-07-23 RX ADMIN — AMLODIPINE BESYLATE 5 MG: 5 TABLET ORAL at 20:29

## 2024-07-23 RX ADMIN — Medication: at 20:28

## 2024-07-23 RX ADMIN — CALCIUM 500 MG: 500 TABLET ORAL at 09:59

## 2024-07-23 RX ADMIN — DICYCLOMINE HYDROCHLORIDE 20 MG: 10 CAPSULE ORAL at 15:59

## 2024-07-23 RX ADMIN — LOPERAMIDE HYDROCHLORIDE 2 MG: 2 CAPSULE ORAL at 23:30

## 2024-07-23 RX ADMIN — LOPERAMIDE HYDROCHLORIDE 2 MG: 2 CAPSULE ORAL at 16:00

## 2024-07-23 RX ADMIN — SODIUM CHLORIDE: 9 INJECTION, SOLUTION INTRAVENOUS at 11:53

## 2024-07-23 RX ADMIN — Medication: at 10:00

## 2024-07-23 RX ADMIN — DICYCLOMINE HYDROCHLORIDE 20 MG: 10 CAPSULE ORAL at 20:29

## 2024-07-23 RX ADMIN — DICYCLOMINE HYDROCHLORIDE 20 MG: 10 CAPSULE ORAL at 06:22

## 2024-07-23 RX ADMIN — LOPERAMIDE HYDROCHLORIDE 2 MG: 2 CAPSULE ORAL at 11:41

## 2024-07-23 RX ADMIN — CALCIUM 500 MG: 500 TABLET ORAL at 20:29

## 2024-07-23 RX ADMIN — LOPERAMIDE HYDROCHLORIDE 2 MG: 2 CAPSULE ORAL at 06:22

## 2024-07-23 NOTE — PROGRESS NOTES
Hospitalist Progress Note    Patient:  Tiffany CURRY [facility]      Unit/Bed:5-06/006-A    YOB: 1950    MRN: [id number]       Acct: [id number]     PCP: Pooja Herrera APRN - CNP    Date of Admission: 7/12/2024    Assessment/Plan:    Enteritis:  due to Enteropathogenic E.Coli     -CT abdomen/ pelvis with IV contrast  done 7/12/24 showed: Moderate mucosal thickening is noted in the mid and distal small bowel loops  as well as diffuse colonic wall thickening suggesting enterocolitis   -ID consulted, IV Zosyn dc'd   -GI consulted 7/22/24, recommended stool c/s, C. Diff antigen, as well as CMV serum test   -GI  also recommended Budesonide 9 mg daily as well   -Continue supportive care    -Anti-emetics PRN   -Scopolamine patch ordered   -Probiotics ordered   -Diet advanced to soft from full liquid     2.Acute neutropenia   -Hematology consulted   -Continue Neutropenic cautions    -ANC > 1000 for 3 days, okay to d/c Neupogen per pharmacy     3. Thrombocytopenia: Patient s/p 10 units of platelets, one unit of PRBC's so far this admission   -Hematology consulted   -Continue to trend   -SCD's for DVT prophylaxis     4.Primary Hypertension  -Continue Amlodipine with parameters     5.History of breast cancer: Noted   -Heme/Onc Consulted previously during admission     6.Squamous cell carcinoma of anal canal:  -Patient follows with [facility] oncology outpatient  -Patient s/p radiation therapy 7/16/24     7.Lactic acidosis now resolved     8.Hypokalemia:  -Replace per protocol     8.Normocytic anemia:   -Trend Hgb   -s/p 1 unit PRBC's this admission      LDA: []CVC / []PICC / []Midline / []Oseguera / []Drains / []Mediport / [x]None  Antibiotics: No  Steroids: No  Labs (still needed?): [x]Yes / []No  IVF (still needed?): []Yes / [x]No    Level of care: []Step Down / [x]Med-Surg  Bed Status: [x]Inpatient / []Observation  Telemetry: []Yes / [x]No  PT/OT: [x]Yes / []No    DVT Prophylaxis: [] Lovenox / [] Heparin /  obstruction.      2. Chronic findings are discussed.            **This report has been created using voice recognition software.  It may contain   minor errors which are inherent in voice recognition technology.**      Electronically signed by Dr. Caren Cadet      XR CHEST PORTABLE   Final Result   There is no acute intrathoracic process.               **This report has been created using voice recognition software. It may contain   minor errors which are inherent in voice recognition technology.**      Electronically signed by Dr Maykel Rod          Diet: ADULT ORAL NUTRITION SUPPLEMENT; Breakfast, Lunch, Dinner; Standard 4 oz Oral Supplement  ADULT DIET; Dysphagia - Soft and Bite Sized; Low Microbial      Code Status: Full Code      Electronically signed by Chris Valentine PA-C on 7/23/2024 at 9:02 AM

## 2024-07-23 NOTE — PROGRESS NOTES
[facility]  INPATIENT PHYSICAL THERAPY  DAILY NOTE  [facility] ONC MED 5K - 5K-06/006-A      Time In: 1056  Time Out: 1134  Timed Code Treatment Minutes: 38 Minutes  Minutes: 38          Date: 2024  Patient Name: Tiffany Baldwin,  Gender:  female        MRN: [id number]  : 1950  (74 y.o.)     Referring Practitioner: Joana Galvez APRN - CNP  Diagnosis: enteritis  Additional Pertinent Hx: Per EMR \"Patient is referred to the emergency department from outpatient oncology infusion center where she had presented for her chemotherapy treatment.  Patient reported nausea and vomiting and diarrhea.  She has had chills.  Reports her urine is very dark almost the color of blood.  Patient did not receive her chemo treatment and was instead referred to the emergency department for further evaluation of illness.\"     Prior Level of Function:  Lives With: Alone  Type of Home: House  Home Layout: One level  Home Access: Stairs to enter with rails  Entrance Stairs - Number of Steps: 3-4 RAMESH  Entrance Stairs - Rails: Right  Home Equipment: None   Bathroom Shower/Tub: Tub/Shower unit  Bathroom Toilet: Standard  Bathroom Accessibility: Accessible    Receives Help From: Family  ADL Assistance: Independent  Homemaking Assistance: Independent  Ambulation Assistance: Independent  Transfer Assistance: Independent  Active : Yes  IADL Comments: owns a dog and a cat  Additional Comments: daughter works days, son work nights \"they do what they can\" Pt uses no AD for mobility. Pt reports the last week she has has more trouble getting around and completing her IADL tasks.    Restrictions/Precautions:  Restrictions/Precautions: General Precautions, Fall Risk  Position Activity Restriction  Other position/activity restrictions: neutropenic, monitor hemoglobin     SUBJECTIVE: pt voiced frustrations of her cancer tx and complications and provided a listening ear, pt very impulsive and needed cues for safety  Inpatient T-Scale Score : 40.78  Mobility Inpatient CMS 0-100% Score: 54.16  Mobility Inpatient CMS G-Code Modifier : CK        Modified [place] Scale:  Not Applicable    ASSESSMENT:  Assessment:  pt cont to demonstrate weakness and decreased balance and endurance along with decreased safety awareness, pt would benefit from cont skilled PT   Activity Tolerance:  Patient tolerance of  treatment: pt required rest breaks   Equipment Recommendations:Equipment Needed: Yes  Mobility Devices: Walker  Other: will need walker if returns home  Discharge Recommendations: Subacte/Skilled Nursing Facility  Plan: Current Treatment Recommendations: Strengthening, Balance training, Gait training, Neuromuscular re-education, Functional mobility training, Transfer training, Stair training, Home exercise program, Therapeutic activities, Safety education & training, Patient/Caregiver education & training, Endurance training, Equipment evaluation, education, & procurement  General Plan:  (5x GM)    Education:  Learners: Patient  Patient Education: Plan of Care, Home Exercise Program, Gait    Goals:  Patient Goals : return home  Short Term Goals  Time Frame for Short Term Goals: by discharge  Short Term Goal 1: Pt will demo sit to/from stand transfers with IND with LRAD to progress with mobility.  Short Term Goal 2: Pt will demo supine to/from sit transfers with IND with bed flat to return home safely.  Short Term Goal 3: Pt will amb for 75 feet with RW with LRAD and S to progress with mobility.  Short Term Goal 4: Pt will negotiate steps with rail for support with S to progress with mobility.  Long Term Goals  Time Frame for Long Term Goals : NA due to short ELOS    Following session, patient left in safe position with all fall risk precautions in place.

## 2024-07-23 NOTE — PROGRESS NOTES
07/23/24 1244   Encounter Summary   Encounter Overview/Reason Attempted Encounter   Service Provided For Patient   Referral/Consult From [facility] [place]   Support System Children;Family members   Last Encounter  07/23/24   Complexity of Encounter Low   Begin Time 1239   End Time  1244   Total Time Calculated 5 min   Spiritual/Emotional needs   Type Spiritual Support   Assessment/Intervention/Outcome   Assessment Unable to assess   Intervention Prayer (assurance of)/[place]   Outcome Did not respond     During my encounter with the 74 yr old patient, I attempted to visit with the pt on 5K. The patient appears to be resting (not responsive/resting) now and I didn't want to disturb the patient. I or another  will attempt to visit the patient or the family at another time. The pt was admitted due to enteritis.

## 2024-07-23 NOTE — CONSULTS
CONSULTATION NOTE :GI       Patient - Tiffany Baldwin,  Age - 74 y.o.    - 1950      Room Number - 5K-06/006-A   N -  [id number]   [facility] # - [id number]  Date of Admission -  2024 11:40 AM  Patient's PCP: Pooja Herrera APRN - CNP     Requesting Physician: Chris Valentine PA-C    REASON FOR CONSULTATION     Enteritis, nausea, vomiting, diarrhea  HISTORY OF PRESENT ILLNESS       This is a very pleasant 74 y.o. female who was admitted to the hospital with a chief complaints of nausea and vomiting and diarrhea. She has had chills. Reports her urine is very dark almost the color of blood. Patient did not receive her chemo treatment and was instead referred to the emergency department for further evaluation of illness.   Pt states she is feeling better, bowel movements are improving and tolerating soft diet.     CT- Moderate mucosal thickening is noted in the mid and distal small bowel loops  as well as diffuse colonic wall thickening suggesting enterocolitis. A small  amount of ascites is noted in the dependent portion of the pelvis. No fluid  collection or free air is observed. The small bowel loops in the lower  abdomen/pelvis are dilated measuring up to 3 cm in diameter and contain feculent  material suggesting stasis. No transition point is identified to suggest a  mechanical bowel obstruction.    GI panel showing +ve E. Coli PCR   PAST MEDICAL AND SURGICAL HISTORY       Past Medical History:   Diagnosis Date    Anal cancer (HCC)     Breast cancer (HCC)     Hyperlipidemia     Hypertension        Past Surgical History:   Procedure Laterality Date    ANUS SURGERY N/A 2024    Transrectal Excision Squamous Cell Carcinoma performed by Rene Zamudio DO at [facility] OR    BREAST LUMPECTOMY Left 2007    COLONOSCOPY  2024    Dr. Hernandez    COLONOSCOPY  2013    [facility] Dr. Hernandez    PORT SURGERY Left 2024    SINGLE LUMEN SMARTPORT    General:  Awake, alert, not in distress.  HEENT: pink conjunctiva, unicteric sclera, moist oral mucosa.  Chest:  clear  Cardiovascular:  RRR ,S1S2, no murmur or gallop.  Abdomen:  Soft, non tender to palpation.  Extremities: No edema  Skin:  Warm and dry.  CNS: A&O x3.        LABS:     CBC:   Recent Labs     07/22/24  0532 07/23/24  0630   WBC 4.6* 6.3   HGB 9.3* 9.1*   PLT 36* 40*     BMP:    Recent Labs     07/21/24  0906 07/22/24  0532   *  --    K 3.4* 4.1   CL 94*  --    CO2 24  --    BUN 6*  --    CREATININE 0.5  --    GLUCOSE 77  --      Calcium:  Recent Labs     07/21/24  0906   CALCIUM 7.9*     Ionized Calcium:Invalid input(s): \"IONCA\"  Magnesium:  Recent Labs     07/20/24  0919   MG 2.2     Phosphorus:No results for input(s): \"PHOS\" in the last 72 hours.  BNP:No results for input(s): \"BNP\" in the last 72 hours.  Glucose:  Recent Labs     07/20/24  0912   POCGLU 95     HgbA1C: No results for input(s): \"LABA1C\" in the last 72 hours.  INR: No results for input(s): \"INR\" in the last 72 hours.  Hepatic: No results for input(s): \"ALKPHOS\", \"ALT\", \"AST\", \"BILITOT\", \"BILIDIR\", \"LABALBU\" in the last 72 hours.    Invalid input(s): \"PROT\"  Amylase and Lipase:No results for input(s): \"LACTA\", \"AMYLASE\" in the last 72 hours.  Lactic Acid: No results for input(s): \"LACTA\" in the last 72 hours.  Troponin: No results for input(s): \"CKTOTAL\", \"CKMB\", \"TROPONINI\" in the last 72 hours.  BNP: No results for input(s): \"BNP\" in the last 72 hours.  Lipids: No results for input(s): \"CHOL\", \"TRIG\", \"HDL\", \"LDL\" in the last 72 hours.    Invalid input(s): \"LDLCALC\"  ABGs: No results found for: \"PHART\", \"PO2ART\", \"WIM4IOY\", \"LZG0OZI\", \"BEART\"    Cultures:      CXR:       UA: No results for input(s): \"SPECGRAV\", \"PHUR\", \"COLORU\", \"CLARITYU\", \"MUCUS\", \"PROTEINU\", \"BLOODU\", \"RBCUA\", \"WBCUA\", \"BACTERIA\", \"NITRU\", \"GLUCOSEU\", \"BILIRUBINUR\", \"UROBILINOGEN\", \"KETUA\", \"LABCAST\", \"LABCASTTY\", \"AMORPHOS\" in the last 72

## 2024-07-23 NOTE — PLAN OF CARE
Problem: Discharge Planning  Goal: Discharge to home or other facility with appropriate resources  7/23/2024 1256 by Hans Pedersen RN  Outcome: Progressing     Problem: Safety - Adult  Goal: Free from fall injury  7/23/2024 1256 by Hans Pedersen RN  Outcome: Progressing     Problem: Skin/Tissue Integrity  Goal: Absence of new skin breakdown  Description: 1.  Monitor for areas of redness and/or skin breakdown  2.  Assess vascular access sites hourly  3.  Every 4-6 hours minimum:  Change oxygen saturation probe site  4.  Every 4-6 hours:  If on nasal continuous positive airway pressure, respiratory therapy assess nares and determine need for appliance change or resting period.  7/23/2024 1256 by Hans Pedersen RN  Outcome: Progressing     Problem: Musculoskeletal - Adult  Goal: Return ADL status to a safe level of function  7/23/2024 1256 by Hans Pedersen RN  Outcome: Progressing     Problem: Gastrointestinal - Adult  Goal: Maintains or returns to baseline bowel function  7/23/2024 1256 by Hans Pedersen RN  Outcome: Progressing     Problem: Pain  Goal: Verbalizes/displays adequate comfort level or baseline comfort level  7/23/2024 1256 by Hans Pedersen RN  Outcome: Progressing     Problem: Skin/Tissue Integrity - Adult  Goal: Incisions, wounds, or drain sites healing without S/S of infection  7/23/2024 1256 by Hans Pedersen RN  Outcome: Progressing     Problem: Genitourinary - Adult  Goal: Absence of urinary retention  7/23/2024 1256 by Hans Pedersen RN  Outcome: Progressing     Problem: Hematologic - Adult  Goal: Maintains hematologic stability  7/23/2024 1256 by Hans Pedersen RN  Outcome: Progressing     Problem: Nutrition Deficit:  Goal: Optimize nutritional status  7/23/2024 1256 by Hans Pedersen RN  Outcome: Progressing     Problem: Neurosensory - Adult  Goal: Achieves maximal functionality and self care  7/23/2024 1256 by Hans Pedersen RN  Outcome: Progressing     Problem: Respiratory - Adult  Goal:  Achieves optimal ventilation and oxygenation  7/23/2024 1256 by Hans Pedersen RN  Outcome: Progressing     Problem: Cardiovascular - Adult  Goal: Absence of cardiac dysrhythmias or at baseline  7/23/2024 1256 by Hans Pedersen RN  Outcome: Progressing     Problem: Infection - Adult  Goal: Absence of fever/infection during anticipated neutropenic period  7/23/2024 1256 by Hans Pedersen RN  Outcome: Progressing     Problem: Infection - Adult  Goal: Absence of infection during hospitalization  7/23/2024 1256 by Hans Pedersen RN  Outcome: Progressing     Problem: Metabolic/Fluid and Electrolytes - Adult  Goal: Electrolytes maintained within normal limits  7/23/2024 1256 by Hans Pedersen RN  Outcome: Progressing    Care plan reviewed with patient , patient verbalized understanding of plan of care and contributed to goal setting.

## 2024-07-23 NOTE — PROGRESS NOTES
Comprehensive Nutrition Assessment    Type and Reason for Visit:  Reassess (Severe malnutrition, PO monitor)    Nutrition Recommendations/Plan:   Recommend appetite stimulant - pt with very minimal PO intake and not accepting of ONS shakes  Continue diet per provider and encourage adequate PO intake at best efforts and as tolerated.  Modify ONS: Yogurt (BID) - pt does not like/tolerate ONS shakes      Malnutrition Assessment:  Malnutrition Status:  Severe malnutrition (07/15/24 1141)    Context:  Acute Illness     Findings of the 6 clinical characteristics of malnutrition:  Energy Intake:  50% or less of estimated energy requirements for 5 or more days (Endorses very poor PO intake for 3 weeks)  Weight Loss:  Unable to assess (edema and fluctuations in EMR)     Body Fat Loss:  Mild body fat loss Orbital   Muscle Mass Loss:  Moderate muscle mass loss Temples (temporalis), Scapula (trapezius), Clavicles (pectoralis & deltoids)  Fluid Accumulation:  Mild Extremities   Strength:  Not Performed    Nutrition Assessment:     Pt. With no improvement from nutritional standpoint AEB poor appetite continues, intake remains less than 50% of meals and not tolerating ONS options. 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       Nutrition Related Findings:   Pt. Report/Treatments/Miscellaneous:   7/23/24: Pt seen, with not much improvement in PO intake/appetite/taste/etc. She does not like Ensure compact but was amendable to trying yogurt. Pt with emesis after eating dinner yesterday. States today has been much better with getting zofran. Diarrhea subsiding with imodium use.   7/19/24: RN- reports provided zofran prior to breakfast as

## 2024-07-23 NOTE — CARE COORDINATION
7/23/24, 2:47 PM EDT    DISCHARGE PLANNING EVALUATION     SW met with Tiffany this afternoon, pt continue to plan to go to [facility] for skilled care at discharge before returning home. Pt reports she has been feeling much better.

## 2024-07-23 NOTE — PROGRESS NOTES
[facility]  OCCUPATIONAL THERAPY MISSED TREATMENT NOTE  [facility] ONC MED 5K  5K-06/006-A      Date: 2024  Patient Name: Tiffany Baldwin        CSN: [id number]   : 1950  (74 y.o.)  Gender: female   Referring Practitioner: KRYSTA El  Diagnosis: enteritis         REASON FOR MISSED TREATMENT: Patient Unavailable with provider at this time. Will attempt next available time.

## 2024-07-23 NOTE — PLAN OF CARE
Problem: Discharge Planning  Goal: Discharge to home or other facility with appropriate resources  7/23/2024 0001 by Carly Diego RN  Outcome: Progressing  Flowsheets (Taken 7/23/2024 0001)  Discharge to home or other facility with appropriate resources:   Identify barriers to discharge with patient and caregiver   Identify discharge learning needs (meds, wound care, etc)     Problem: Safety - Adult  Goal: Free from fall injury  7/23/2024 0001 by Carly Diego RN  Outcome: Progressing  Flowsheets (Taken 7/23/2024 0001)  Free From Fall Injury: Instruct family/caregiver on patient safety  Note: Patient bed alarm on at this time.     Problem: Skin/Tissue Integrity  Goal: Absence of new skin breakdown  Description: 1.  Monitor for areas of redness and/or skin breakdown  2.  Assess vascular access sites hourly  3.  Every 4-6 hours minimum:  Change oxygen saturation probe site  4.  Every 4-6 hours:  If on nasal continuous positive airway pressure, respiratory therapy assess nares and determine need for appliance change or resting period.  7/23/2024 0001 by Carly Diego RN  Outcome: Progressing  Note: Patient able to reposition self at this time       Problem: Musculoskeletal - Adult  Goal: Return ADL status to a safe level of function  7/23/2024 0001 by Carly Diego RN  Outcome: Progressing  Flowsheets (Taken 7/23/2024 0001)  Return ADL Status to a Safe Level of Function:   Administer medication as ordered   Assess activities of daily living deficits and provide assistive devices as needed     Problem: Gastrointestinal - Adult  Goal: Maintains or returns to baseline bowel function  7/23/2024 0001 by Carly Diego RN  Outcome: Progressing  Flowsheets (Taken 7/23/2024 0001)  Maintains or returns to baseline bowel function:   Assess bowel function   Encourage oral fluids to ensure adequate hydration     Problem: Pain  Goal: Verbalizes/displays adequate comfort level or baseline comfort

## 2024-07-23 NOTE — CARE COORDINATION
7/23/24, 7:47 AM EDT    DISCHARGE ON GOING EVALUATION    Tiffany CURRY [facility] day: 11  Location: -06/006-A Reason for admit: Enteritis [K52.9]     Procedures:   7/13 2 units platelets  7/15 4 units platelets  7/16 1 unit PRBC   7/18 4 units platelets  7/19 1 unit PRBC    Imaging since last note: none     Barriers to Discharge: Hospitalist and ID following. GI consulted last evening for enteritis, n/v/d- to see today. Advanced to soft/bite sized; low microbial diet yesterday. Bentyl. Lactobacillus. Imodium q6h. IV zofran prn. Scopolamine patch. Nivestym discontinued. HR tachy (107)      Latest Reference Range & Units 07/18/24 16:42 07/19/24 04:00 07/19/24 18:34 07/20/24 04:03 07/22/24 05:32 07/23/24 06:30   WBC 4.8 - 10.8 thou/mm3 0.9 (LL) 2.6 (L)  3.5 (L) 4.6 (L) 6.3   RBC 4.20 - 5.40 mill/mm3 2.74 (L) 2.31 (L)  3.23 (L) 3.20 (L) 3.08 (L)   Hemoglobin Quant 12.0 - 16.0 gm/dl 8.3 (L) 6.9 (LL) 10.0 (L) 9.4 (L) 9.3 (L) 9.1 (L)   Hematocrit 37.0 - 47.0 % 24.5 (L) 20.7 (LL) 29.5 (L) 27.6 (L) 27.7 (L) 26.9 (L)   Platelet Count 130 - 400 thou/mm3 27 (LL) 88 (L)  59 (L) 36 (L) 40 (L)     PCP: Pooja Herrera APRN - CNP  Readmission Risk Score: 16    Patient Goals/Plan/Treatment Preferences: From home alone. [place]. No precert. SW following.

## 2024-07-23 NOTE — PROGRESS NOTES
ID Progress Note    Patient - Tiffany Baldwin,  Age - 74 y.o.    - 1950      Room Number - 5K-06/006-A   MRN -  [id number]   [facility] # - [id number]  Date of Admission -  2024 11:40 AM    SUBJECTIVE:   Patient denies any fever or chills overnight.  States that she has not had any episodes of diarrhea since starting the Imodium.  States 1 episode of vomiting yesterday after dinner, tolerating breakfast well today.    OBJECTIVE   VITALS    height is 1.524 m (5') and weight is 69.7 kg (153 lb 10.6 oz). Her oral temperature is 97.9 °F (36.6 °C). Her blood pressure is 131/88 and her pulse is 107 (abnormal). Her respiration is 18 and oxygen saturation is 98%.       Wt Readings from Last 3 Encounters:   24 69.7 kg (153 lb 10.6 oz)   24 65.3 kg (144 lb)   24 65.1 kg (143 lb 8 oz)       I/O (24 Hours)    Intake/Output Summary (Last 24 hours) at 2024 0939  Last data filed at 2024 0521  Gross per 24 hour   Intake 450 ml   Output --   Net 450 ml       General Appearance  Awake, alert, oriented,  not  In acute distress  HEENT - normocephalic, atraumatic, pink conjunctiva,  anicteric sclera  Neck - Supple, no mass  Lungs -  Bilateral good air entry, no rhonchi, no wheeze  Cardiovascular - Heart sounds are normal.  Regular rate and rhythm without murmur, gallop or rub.  Abdomen - soft, not distended, minimal epigastric tenderness w/ deep palpation, no organomegally,  Neurologic - moving all extremities. CN II-XII intact bilaterally.   Skin - No bruising or bleeding  Extremities - No edema, no cyanosis, clubbing     MEDICATIONS:      loperamide  2 mg Oral Q6H    scopolamine  1 patch TransDERmal Q72H    lactobacillus  1 capsule Oral Daily with breakfast    dicyclomine  20 mg Oral 4x Daily AC & HS    betamethasone valerate   Topical BID    amLODIPine  5 mg Oral Nightly    calcium elemental  1 tablet Oral BID    sodium chloride flush  5-40 mL IntraVENous 2 times per day    [Held by  provider] enoxaparin  40 mg SubCUTAneous Daily      sodium chloride      sodium chloride      sodium chloride      sodium chloride      sodium chloride      sodium chloride 20 mL/hr at 07/21/24 2043     sodium chloride, prochlorperazine, sodium chloride, magic (miracle) mouthwash, sodium chloride, potassium chloride **OR** potassium chloride, calcium carbonate, sodium chloride, sodium chloride, sodium phosphate 15 mmol in sodium chloride 0.9 % 250 mL IVPB, calcium gluconate, prochlorperazine, sodium chloride flush, sodium chloride, potassium chloride **OR** potassium alternative oral replacement **OR** potassium chloride, magnesium sulfate, ondansetron **OR** ondansetron, polyethylene glycol, acetaminophen **OR** acetaminophen  LABS:   CBC   Recent Labs     07/23/24  0630   WBC 6.3   HGB 9.1*   HCT 26.9*   MCV 87.3   PLT 40*     BMP:   Recent Labs     07/21/24  0906 07/22/24  0532   *  --    K 3.4* 4.1   CL 94*  --    CO2 24  --    BUN 6*  --    CREATININE 0.5  --    GLUCOSE 77  --      LIVER PROFILE No results for input(s): \"AST\", \"ALT\", \"LIPASE\", \"AMYLASE\", \"BILIDIR\", \"BILITOT\", \"ALKPHOS\" in the last 72 hours.    Invalid input(s): \"ALB\"  INR No results for input(s): \"INR\" in the last 72 hours.  PTT No results found for: \"APTT\"    CULTURES:   UA: No results for input(s): \"SPECGRAV\", \"PHUR\", \"COLORU\", \"CLARITYU\", \"MUCUS\", \"PROTEINU\", \"BLOODU\", \"RBCUA\", \"WBCUA\", \"BACTERIA\", \"NITRU\", \"GLUCOSEU\", \"BILIRUBINUR\", \"UROBILINOGEN\", \"KETUA\", \"LABCAST\", \"LABCASTTY\", \"AMORPHOS\" in the last 72 hours.    Invalid input(s): \"CRYSTALS\"  Micro:   Lab Results   Component Value Date/Time    BC No growth 24 hours. 07/12/2024 10:52 PM    BC  07/12/2024 10:52 PM     possible contamination; clinical correlation required    BC  07/12/2024 10:52 PM     possible contamination; clinical correlation required         IMAGING:   CTAP 7/12/24  IMPRESSION:  1. Moderate mucosal thickening is noted in the mid and distal small bowel loops  as  well as diffuse colonic wall thickening suggesting enterocolitis. A small  amount of ascites is noted in the dependent portion of the pelvis. No fluid  collection or free air is observed. The small bowel loops in the lower  abdomen/pelvis are dilated measuring up to 3 cm in diameter and contain feculent  material suggesting stasis. No transition point is identified to suggest a  mechanical bowel obstruction.    **This report has been created using voice recognition software.  It may contain  minor errors which are inherent in voice recognition technology.**     Electronically signed by Dr. Caren Cadet    ASSESSMENT/PLAN     Patient Active Problem List   Diagnosis Code    Primary hypertension I10    Mixed hyperlipidemia E78.2    Squamous cell carcinoma of anal canal ([facility]) C21.1    Enteritis K52.9    Severe malnutrition ([facility]) E43    Thrombocytopenia ([facility]) D69.6    Neutropenia ([facility]) D70.9     Neutropenic enterocolitis: GI panel with positive enteropathic E. coli via PCR.  Neutropenia appears to be improving, patient is afebrile.  S/p 7-day course of Zosyn 7/12 through 7/19  Urine culture mixed growth patient denies any frequency, burning with urination, does endorse mild tenderness to palpation of the abdomen.  Blood cultures negative  Neutropenia improving with Nivestym  Continue Imodium every 6 h  Patient was seen and examined face-to-face by me  The chart, progress notes, labs and radiographs were reviewed.  . Questions and concerns were addressed.I agree with the progress note.  Ok with discharge plan

## 2024-07-24 LAB
BASOPHILS ABSOLUTE: 0.1 THOU/MM3 (ref 0–0.1)
BASOPHILS NFR BLD AUTO: 1.5 %
DEPRECATED RDW RBC AUTO: 48.8 FL (ref 35–45)
EOSINOPHIL NFR BLD AUTO: 0 %
EOSINOPHILS ABSOLUTE: 0 THOU/MM3 (ref 0–0.4)
ERYTHROCYTE [DISTWIDTH] IN BLOOD BY AUTOMATED COUNT: 16.3 % (ref 11.5–14.5)
HCT VFR BLD AUTO: 26.9 % (ref 37–47)
HGB BLD-MCNC: 8.8 GM/DL (ref 12–16)
IMM GRANULOCYTES # BLD AUTO: 0.77 THOU/MM3 (ref 0–0.07)
IMM GRANULOCYTES NFR BLD AUTO: 14.9 %
LYMPHOCYTES ABSOLUTE: 0.6 THOU/MM3 (ref 1–4.8)
LYMPHOCYTES NFR BLD AUTO: 10.8 %
MCH RBC QN AUTO: 28.5 PG (ref 26–33)
MCHC RBC AUTO-ENTMCNC: 32.7 GM/DL (ref 32.2–35.5)
MCV RBC AUTO: 87.1 FL (ref 81–99)
MONOCYTES ABSOLUTE: 1 THOU/MM3 (ref 0.4–1.3)
MONOCYTES NFR BLD AUTO: 18.3 %
NEUTROPHILS ABSOLUTE: 2.8 THOU/MM3 (ref 1.8–7.7)
NEUTROPHILS NFR BLD AUTO: 54.5 %
NRBC BLD AUTO-RTO: 1 /100 WBC
PLATELET # BLD AUTO: 46 THOU/MM3 (ref 130–400)
PLATELET BLD QL SMEAR: ABNORMAL
PMV BLD AUTO: 11 FL (ref 9.4–12.4)
POIKILOCYTES: ABNORMAL
POLYCHROMASIA BLD QL SMEAR: ABNORMAL
RBC # BLD AUTO: 3.09 MILL/MM3 (ref 4.2–5.4)
SCAN OF BLOOD SMEAR: NORMAL
TOXIC GRANULES BLD QL SMEAR: PRESENT
WBC # BLD AUTO: 5.2 THOU/MM3 (ref 4.8–10.8)

## 2024-07-24 PROCEDURE — 99232 SBSQ HOSP IP/OBS MODERATE 35: CPT | Performed by: PHYSICIAN ASSISTANT

## 2024-07-24 PROCEDURE — 6370000000 HC RX 637 (ALT 250 FOR IP): Performed by: PHYSICIAN ASSISTANT

## 2024-07-24 PROCEDURE — 1200000000 HC SEMI PRIVATE

## 2024-07-24 PROCEDURE — 6370000000 HC RX 637 (ALT 250 FOR IP)

## 2024-07-24 PROCEDURE — 97116 GAIT TRAINING THERAPY: CPT

## 2024-07-24 PROCEDURE — 85025 COMPLETE CBC W/AUTO DIFF WBC: CPT

## 2024-07-24 PROCEDURE — 6370000000 HC RX 637 (ALT 250 FOR IP): Performed by: NURSE PRACTITIONER

## 2024-07-24 PROCEDURE — 97110 THERAPEUTIC EXERCISES: CPT

## 2024-07-24 PROCEDURE — 97530 THERAPEUTIC ACTIVITIES: CPT

## 2024-07-24 PROCEDURE — 6370000000 HC RX 637 (ALT 250 FOR IP): Performed by: STUDENT IN AN ORGANIZED HEALTH CARE EDUCATION/TRAINING PROGRAM

## 2024-07-24 RX ORDER — MEGESTROL ACETATE 40 MG/1
40 TABLET ORAL DAILY
Status: DISCONTINUED | OUTPATIENT
Start: 2024-07-24 | End: 2024-07-25 | Stop reason: HOSPADM

## 2024-07-24 RX ADMIN — Medication: at 11:24

## 2024-07-24 RX ADMIN — Medication 1 CAPSULE: at 11:21

## 2024-07-24 RX ADMIN — DICYCLOMINE HYDROCHLORIDE 20 MG: 10 CAPSULE ORAL at 06:34

## 2024-07-24 RX ADMIN — DICYCLOMINE HYDROCHLORIDE 20 MG: 10 CAPSULE ORAL at 16:05

## 2024-07-24 RX ADMIN — CALCIUM 500 MG: 500 TABLET ORAL at 11:23

## 2024-07-24 RX ADMIN — CALCIUM 500 MG: 500 TABLET ORAL at 20:14

## 2024-07-24 RX ADMIN — LOPERAMIDE HYDROCHLORIDE 2 MG: 2 CAPSULE ORAL at 11:21

## 2024-07-24 RX ADMIN — AMLODIPINE BESYLATE 5 MG: 5 TABLET ORAL at 20:14

## 2024-07-24 RX ADMIN — LOPERAMIDE HYDROCHLORIDE 2 MG: 2 CAPSULE ORAL at 06:34

## 2024-07-24 RX ADMIN — Medication: at 20:15

## 2024-07-24 RX ADMIN — DICYCLOMINE HYDROCHLORIDE 20 MG: 10 CAPSULE ORAL at 11:21

## 2024-07-24 RX ADMIN — LOPERAMIDE HYDROCHLORIDE 2 MG: 2 CAPSULE ORAL at 16:06

## 2024-07-24 RX ADMIN — MEGESTROL ACETATE 40 MG: 40 TABLET ORAL at 16:04

## 2024-07-24 RX ADMIN — BUDESONIDE 9 MG: 3 CAPSULE, GELATIN COATED ORAL at 11:23

## 2024-07-24 RX ADMIN — DICYCLOMINE HYDROCHLORIDE 20 MG: 10 CAPSULE ORAL at 20:13

## 2024-07-24 ASSESSMENT — PAIN SCALES - GENERAL
PAINLEVEL_OUTOF10: 0
PAINLEVEL_OUTOF10: 2
PAINLEVEL_OUTOF10: 3
PAINLEVEL_OUTOF10: 3
PAINLEVEL_OUTOF10: 0

## 2024-07-24 ASSESSMENT — PAIN DESCRIPTION - FREQUENCY
FREQUENCY: INTERMITTENT
FREQUENCY: INTERMITTENT

## 2024-07-24 ASSESSMENT — PAIN DESCRIPTION - LOCATION
LOCATION: ABDOMEN

## 2024-07-24 ASSESSMENT — PAIN DESCRIPTION - DESCRIPTORS
DESCRIPTORS: ACHING
DESCRIPTORS: ACHING
DESCRIPTORS: SORE

## 2024-07-24 ASSESSMENT — PAIN DESCRIPTION - ORIENTATION
ORIENTATION: RIGHT;LEFT;LOWER
ORIENTATION: LOWER;RIGHT;LEFT

## 2024-07-24 ASSESSMENT — PAIN DESCRIPTION - ONSET
ONSET: GRADUAL
ONSET: GRADUAL

## 2024-07-24 ASSESSMENT — PAIN DESCRIPTION - PAIN TYPE
TYPE: ACUTE PAIN

## 2024-07-24 NOTE — PROGRESS NOTES
[facility]  INPATIENT PHYSICAL THERAPY  DAILY NOTE  [facility] ONC MED 5K - 5K-06/006-A      Time In: 1332  Time Out: 1410  Timed Code Treatment Minutes: 38 Minutes  Minutes: 38          Date: 2024  Patient Name: Tiffany Baldwin,  Gender:  female        MRN: [id number]  : 1950  (74 y.o.)     Referring Practitioner: Joana Galvez APRN - CNP  Diagnosis: enteritis  Additional Pertinent Hx: Per EMR \"Patient is referred to the emergency department from outpatient oncology infusion center where she had presented for her chemotherapy treatment.  Patient reported nausea and vomiting and diarrhea.  She has had chills.  Reports her urine is very dark almost the color of blood.  Patient did not receive her chemo treatment and was instead referred to the emergency department for further evaluation of illness.\"     Prior Level of Function:  Lives With: Alone  Type of Home: House  Home Layout: One level  Home Access: Stairs to enter with rails  Entrance Stairs - Number of Steps: 3-4 RAMESH  Entrance Stairs - Rails: Right  Home Equipment: None   Bathroom Shower/Tub: Tub/Shower unit  Bathroom Toilet: Standard  Bathroom Accessibility: Accessible    Receives Help From: Family  ADL Assistance: Independent  Homemaking Assistance: Independent  Ambulation Assistance: Independent  Transfer Assistance: Independent  Active : Yes  IADL Comments: owns a dog and a cat  Additional Comments: daughter works days, son work nights \"they do what they can\" Pt uses no AD for mobility. Pt reports the last week she has has more trouble getting around and completing her IADL tasks.    Restrictions/Precautions:  Restrictions/Precautions: General Precautions, Fall Risk  Position Activity Restriction  Other position/activity restrictions: neutropenic, monitor hemoglobin     SUBJECTIVE: pt requesting to use bathroom on arrival, pt very impulsive and had difficulty following verbal directions needing extra cues and  Inpatient T-Scale Score : 40.78  Mobility Inpatient CMS 0-100% Score: 54.16  Mobility Inpatient CMS G-Code Modifier : CK        Modified Lyons Scale:  Not Applicable    ASSESSMENT:  Assessment:  pt cont to demonstrate generalized weakness and decreased balance and endurance and poor safety awareness,  pt would benefit from cont skilled therapy  Activity Tolerance:  Patient tolerance of  treatment:fair   Equipment Recommendations:Equipment Needed: Yes  Mobility Devices: Walker  Other: will need walker if returns home  Discharge Recommendations: Subacte/Skilled Nursing Facility  Plan: Current Treatment Recommendations: Strengthening, Balance training, Gait training, Neuromuscular re-education, Functional mobility training, Transfer training, Stair training, Home exercise program, Therapeutic activities, Safety education & training, Patient/Caregiver education & training, Endurance training, Equipment evaluation, education, & procurement  General Plan:  (5x GM)    Education:  Learners: Patient  Patient Education: Plan of Care, Home Exercise Program, Transfers, Gait    Goals:  Patient Goals : return home  Short Term Goals  Time Frame for Short Term Goals: by discharge  Short Term Goal 1: Pt will demo sit to/from stand transfers with IND with LRAD to progress with mobility.  Short Term Goal 2: Pt will demo supine to/from sit transfers with IND with bed flat to return home safely.  Short Term Goal 3: Pt will amb for 75 feet with RW with LRAD and S to progress with mobility.  Short Term Goal 4: Pt will negotiate steps with rail for support with S to progress with mobility.  Long Term Goals  Time Frame for Long Term Goals : NA due to short ELOS    Following session, patient left in safe position with all fall risk precautions in place.

## 2024-07-24 NOTE — PROGRESS NOTES
Hospitalist Progress Note    [facility]      Unit[facility]5K-06/006-A    YOB: 1950    MRN: [id number]       Acct: [id number]     PCP: Pooja Herrera APRN - CNP    Date of Admission: 7/12/2024    Assessment/Plan:    Enteritis:  due to Enteropathogenic E.Coli     -CT abdomen/ pelvis with IV contrast  done 7/12/24 showed: Moderate mucosal thickening is noted in the mid and distal small bowel loops  as well as diffuse colonic wall thickening suggesting enterocolitis   -ID consulted, IV Zosyn dc'd   -GI consulted 7/22/24, recommended stool c/s, C. Diff antigen, as well as CMV serum test   -GI also recommended Budesonide 9 mg daily as well, ordered, continue   -Continue supportive care    -Anti-emetics PRN   -Scopolamine patch ordered   -Probiotics ordered   -Diet advanced to soft from full liquid   -Dietitian recommending appetite stimulant, hematology/oncology contacted, no contraindication to Megace, Megace tablet ordered     2.Acute neutropenia   -Hematology consulted   -Continue Neutropenic cautions    -ANC > 1000 for 3 days, okay to d/c Neupogen per pharmacy     3. Thrombocytopenia: Patient s/p 10 units of platelets, one unit of PRBC's so far this admission   -Hematology consulted   -Continue to trend   -SCD's for DVT prophylaxis     4.Primary Hypertension  -Continue Amlodipine with parameters     5.History of breast cancer: Noted   -Heme/Onc Consulted previously during admission     6.Squamous cell carcinoma of anal canal:  -Patient follows with [facility] oncology outpatient  -Patient s/p radiation therapy 7/16/24     7.Lactic acidosis now resolved     8.Hypokalemia:  -Replace per protocol     8.Normocytic anemia:   -Trend Hgb   -s/p 1 unit PRBC's this admission      LDA: []CVC / []PICC / []Midline / []Oseguera / []Drains / []Mediport / [x]None  Antibiotics: No  Steroids: No  Labs (still needed?): [x]Yes / []No  IVF (still needed?): []Yes / [x]No    Level of care: []Step Down /

## 2024-07-24 NOTE — CARE COORDINATION
7/24/24, 1:30 PM EDT    DISCHARGE ON GOING EVALUATION    Tiffany J [facility] day: 12  Location: -06/006-A Reason for admit: Enteritis [K52.9]     Procedures:   7/13 2 units platelets  7/15 4 units platelets  7/16 1 unit PRBC   7/18 4 units platelets  7/19 1 unit PRBC    Imaging since last note: none    Barriers to Discharge: Hospitalist, ID, GI and Oncology following. PT/OT. Dietician. Soft/bite sized; low microbial diet yesterday. Bentyl. Lactobacillus. Imodium q6h. Start Megace daily. IV zofran prn. Scopolamine patch. Budesonide. HR tachy- 114. Telesitter placed at bedside over HS for impulsivity. Removed today.      Latest Reference Range & Units 07/23/24 06:30 07/24/24 06:30   WBC 4.8 - 10.8 thou/mm3 6.3 5.2   RBC 4.20 - 5.40 mill/mm3 3.08 (L) 3.09 (L)   Hemoglobin Quant 12.0 - 16.0 gm/dl 9.1 (L) 8.8 (L)   Hematocrit 37.0 - 47.0 % 26.9 (L) 26.9 (L)   RDW-CV 11.5 - 14.5 % 16.1 (H) 16.3 (H)   RDW-SD 35.0 - 45.0 fL 49.4 (H) 48.8 (H)   Platelet Count 130 - 400 thou/mm3 40 (L) 46 (L)     PCP: Pooja Herrera APRN - CNP  Readmission Risk Score: 17.5    Patient Goals/Plan/Treatment Preferences: From home alone. [place]. No precert. SW following.

## 2024-07-24 NOTE — PLAN OF CARE
Problem: Discharge Planning  Goal: Discharge to home or other facility with appropriate resources  7/23/2024 2215 by Sari Jeffries RN  Outcome: Progressing  Flowsheets (Taken 7/23/2024 0001 by Carly Diego, RN)  Discharge to home or other facility with appropriate resources:   Identify barriers to discharge with patient and caregiver   Identify discharge learning needs (meds, wound care, etc)  7/23/2024 1256 by Hans Pedersen RN  Outcome: Progressing     Problem: Safety - Adult  Goal: Free from fall injury  7/23/2024 2215 by Sari Jeffries RN  Outcome: Progressing  Flowsheets (Taken 7/23/2024 0001 by Carly Diego, RN)  Free From Fall Injury: Instruct family/caregiver on patient safety  7/23/2024 1256 by Hans Pedersen RN  Outcome: Progressing     Problem: Skin/Tissue Integrity  Goal: Absence of new skin breakdown  Description: 1.  Monitor for areas of redness and/or skin breakdown  2.  Assess vascular access sites hourly  3.  Every 4-6 hours minimum:  Change oxygen saturation probe site  4.  Every 4-6 hours:  If on nasal continuous positive airway pressure, respiratory therapy assess nares and determine need for appliance change or resting period.  7/23/2024 2215 by Sari Jeffries RN  Outcome: Progressing  7/23/2024 1256 by Hans Pedersen RN  Outcome: Progressing     Problem: Musculoskeletal - Adult  Goal: Return ADL status to a safe level of function  7/23/2024 2215 by Sari Jeffries RN  Outcome: Progressing  Flowsheets (Taken 7/23/2024 0001 by Carly Diego, RN)  Return ADL Status to a Safe Level of Function:   Administer medication as ordered   Assess activities of daily living deficits and provide assistive devices as needed  7/23/2024 1256 by Hans Pedersen RN  Outcome: Progressing     Problem: Gastrointestinal - Adult  Goal: Maintains or returns to baseline bowel function  7/23/2024 2215 by Sari Jeffries RN  Outcome: Progressing  Flowsheets (Taken 7/23/2024 0001 by Carly Diego  Progressing  Flowsheets (Taken 7/23/2024 0001 by Carly Diego RN)  Nutrient intake appropriate for improving, restoring, or maintaining nutritional needs:   Assess nutritional status and recommend course of action   Monitor oral intake, labs, and treatment plans  7/23/2024 1256 by Hans Pedersen RN  Outcome: Progressing     Problem: Neurosensory - Adult  Goal: Achieves maximal functionality and self care  7/23/2024 2215 by Sari Jeffries RN  Outcome: Progressing  Flowsheets (Taken 7/23/2024 0001 by Carly Diego RN)  Achieves maximal functionality and self care: Monitor swallowing and airway patency with patient fatigue and changes in neurological status  7/23/2024 1256 by Hans Pedersen RN  Outcome: Progressing     Problem: Respiratory - Adult  Goal: Achieves optimal ventilation and oxygenation  7/23/2024 2215 by Sari Jeffries RN  Outcome: Progressing  Flowsheets (Taken 7/23/2024 0001 by Carly Diego RN)  Achieves optimal ventilation and oxygenation:   Assess for changes in respiratory status   Assess for changes in mentation and behavior  7/23/2024 1256 by Hans Pedersen RN  Outcome: Progressing     Problem: Cardiovascular - Adult  Goal: Absence of cardiac dysrhythmias or at baseline  7/23/2024 2215 by Sari Jeffries RN  Outcome: Progressing  Flowsheets (Taken 7/23/2024 0001 by Carly Diego RN)  Absence of cardiac dysrhythmias or at baseline: Monitor cardiac rate and rhythm  7/23/2024 1256 by Hans Pedersen RN  Outcome: Progressing     Problem: Infection - Adult  Goal: Absence of fever/infection during anticipated neutropenic period  7/23/2024 2215 by Sari Jeffries RN  Outcome: Progressing  Flowsheets (Taken 7/23/2024 0001 by Carly Diego, RN)  Absence of fever/infection during anticipated neutropenic period: Monitor white blood cell count  7/23/2024 1256 by Hans Pedersen RN  Outcome: Progressing  Goal: Absence of infection during hospitalization  7/23/2024 2215 by Nesha

## 2024-07-25 VITALS
WEIGHT: 153.66 LBS | SYSTOLIC BLOOD PRESSURE: 117 MMHG | RESPIRATION RATE: 18 BRPM | DIASTOLIC BLOOD PRESSURE: 72 MMHG | OXYGEN SATURATION: 95 % | BODY MASS INDEX: 30.17 KG/M2 | TEMPERATURE: 98.4 F | HEIGHT: 60 IN | HEART RATE: 118 BPM

## 2024-07-25 PROBLEM — A04.0 ENTERITIS, ENTEROPATHOGENIC E. COLI: Status: ACTIVE | Noted: 2024-07-25

## 2024-07-25 LAB
BACTERIA STL CULT: NORMAL
CMV IGM SERPL-ACNC: < 8 AU/ML

## 2024-07-25 PROCEDURE — 6370000000 HC RX 637 (ALT 250 FOR IP): Performed by: PHYSICIAN ASSISTANT

## 2024-07-25 PROCEDURE — 6360000002 HC RX W HCPCS: Performed by: PHYSICIAN ASSISTANT

## 2024-07-25 PROCEDURE — 97530 THERAPEUTIC ACTIVITIES: CPT

## 2024-07-25 PROCEDURE — 2580000003 HC RX 258: Performed by: PHYSICIAN ASSISTANT

## 2024-07-25 PROCEDURE — 97110 THERAPEUTIC EXERCISES: CPT

## 2024-07-25 PROCEDURE — 99238 HOSP IP/OBS DSCHRG MGMT 30/<: CPT | Performed by: PHYSICIAN ASSISTANT

## 2024-07-25 PROCEDURE — 6370000000 HC RX 637 (ALT 250 FOR IP)

## 2024-07-25 PROCEDURE — 6370000000 HC RX 637 (ALT 250 FOR IP): Performed by: NURSE PRACTITIONER

## 2024-07-25 RX ORDER — CALCIUM CARBONATE 500 MG/1
500 TABLET, CHEWABLE ORAL 3 TIMES DAILY PRN
DISCHARGE
Start: 2024-07-25 | End: 2024-08-24

## 2024-07-25 RX ORDER — BUDESONIDE 3 MG/1
9 CAPSULE, COATED PELLETS ORAL DAILY
Refills: 3 | DISCHARGE
Start: 2024-07-26 | End: 2024-08-25

## 2024-07-25 RX ORDER — LOPERAMIDE HYDROCHLORIDE 2 MG/1
2 CAPSULE ORAL EVERY 6 HOURS
Qty: 40 CAPSULE | Refills: 0 | DISCHARGE
Start: 2024-07-25 | End: 2024-08-04

## 2024-07-25 RX ORDER — LACTOBACILLUS RHAMNOSUS GG 10B CELL
1 CAPSULE ORAL
DISCHARGE
Start: 2024-07-26

## 2024-07-25 RX ORDER — HEPARIN 100 UNIT/ML
500 SYRINGE INTRAVENOUS ONCE
Status: COMPLETED | OUTPATIENT
Start: 2024-07-25 | End: 2024-07-25

## 2024-07-25 RX ORDER — MEGESTROL ACETATE 40 MG/1
40 TABLET ORAL DAILY
Qty: 30 TABLET | Refills: 3 | DISCHARGE
Start: 2024-07-26

## 2024-07-25 RX ADMIN — SODIUM CHLORIDE 20 ML/HR: 9 INJECTION, SOLUTION INTRAVENOUS at 12:15

## 2024-07-25 RX ADMIN — LOPERAMIDE HYDROCHLORIDE 2 MG: 2 CAPSULE ORAL at 16:11

## 2024-07-25 RX ADMIN — DICYCLOMINE HYDROCHLORIDE 20 MG: 10 CAPSULE ORAL at 11:15

## 2024-07-25 RX ADMIN — MEGESTROL ACETATE 40 MG: 40 TABLET ORAL at 11:12

## 2024-07-25 RX ADMIN — HEPARIN 500 UNITS: 100 SYRINGE at 15:11

## 2024-07-25 RX ADMIN — CALCIUM 500 MG: 500 TABLET ORAL at 11:13

## 2024-07-25 RX ADMIN — Medication: at 11:20

## 2024-07-25 RX ADMIN — LOPERAMIDE HYDROCHLORIDE 2 MG: 2 CAPSULE ORAL at 11:15

## 2024-07-25 RX ADMIN — DICYCLOMINE HYDROCHLORIDE 20 MG: 10 CAPSULE ORAL at 06:14

## 2024-07-25 RX ADMIN — BUDESONIDE 9 MG: 3 CAPSULE, GELATIN COATED ORAL at 11:13

## 2024-07-25 RX ADMIN — SODIUM CHLORIDE, PRESERVATIVE FREE 10 ML: 5 INJECTION INTRAVENOUS at 15:11

## 2024-07-25 RX ADMIN — DICYCLOMINE HYDROCHLORIDE 20 MG: 10 CAPSULE ORAL at 16:11

## 2024-07-25 RX ADMIN — Medication 1 CAPSULE: at 11:15

## 2024-07-25 ASSESSMENT — PAIN DESCRIPTION - LOCATION
LOCATION: ABDOMEN

## 2024-07-25 ASSESSMENT — PAIN DESCRIPTION - ORIENTATION
ORIENTATION: LEFT;RIGHT;LOWER
ORIENTATION: RIGHT;LEFT;LOWER

## 2024-07-25 ASSESSMENT — PAIN SCALES - GENERAL
PAINLEVEL_OUTOF10: 0
PAINLEVEL_OUTOF10: 3
PAINLEVEL_OUTOF10: 2

## 2024-07-25 ASSESSMENT — PAIN DESCRIPTION - DESCRIPTORS
DESCRIPTORS: ACHING
DESCRIPTORS: ACHING
DESCRIPTORS: DISCOMFORT

## 2024-07-25 NOTE — PROGRESS NOTES
ID Progress Note    Patient - Tiffany Baldwin,  Age - 74 y.o.    - 1950      Room Number - 5K-06/006-A   MRN -  [id number]   [facility] # - [id number]  Date of Admission -  2024 11:40 AM    SUBJECTIVE:      States she is feeling better each day. Diarrhea has resolved. Did have two formed stools yesterday but none today. Did described some heartburn-like symptoms that have been occurring at night the past couple days.  OBJECTIVE   VITALS    height is 1.524 m (5') and weight is 69.7 kg (153 lb 10.6 oz). Her oral temperature is 98.2 °F (36.8 °C). Her blood pressure is 118/80 and her pulse is 108 (abnormal). Her respiration is 18 and oxygen saturation is 96%.       Wt Readings from Last 3 Encounters:   24 69.7 kg (153 lb 10.6 oz)   24 65.3 kg (144 lb)   24 65.1 kg (143 lb 8 oz)       I/O (24 Hours)    Intake/Output Summary (Last 24 hours) at 2024 0855  Last data filed at 2024 0612  Gross per 24 hour   Intake 300 ml   Output --   Net 300 ml       General Appearance  Awake, alert, oriented,  not  In acute distress. Chronically ill appearing  HEENT - normocephalic, atraumatic, pink conjunctiva,  anicteric sclera  Neck - Supple, no mass  Lungs -  Bilateral good air entry, no rhonchi, no wheeze  Cardiovascular - Heart sounds are normal.  Regular rate and rhythm without murmur, gallop or rub.  Abdomen - soft, not distended, minimal epigastric tenderness, no organomegally,  Neurologic - moving all extremities, following commands  Skin - No bruising or bleeding  Extremities - No edema, no cyanosis, clubbing     MEDICATIONS:      megestrol  40 mg Oral Daily    budesonide  9 mg Oral Daily    loperamide  2 mg Oral Q6H    scopolamine  1 patch TransDERmal Q72H    lactobacillus  1 capsule Oral Daily with breakfast    dicyclomine  20 mg Oral 4x Daily AC & HS    betamethasone valerate   Topical BID    amLODIPine  5 mg Oral Nightly    calcium elemental  1 tablet Oral BID    sodium

## 2024-07-25 NOTE — PROGRESS NOTES
[facility]  INPATIENT PHYSICAL THERAPY  DAILY NOTE  [facility] ONC MED 5K - 5K-06/006-A    Time In: 1023  Time Out: 1046  Timed Code Treatment Minutes: 23 Minutes  Minutes: 23          Date: 2024  Patient Name: Tiffany Baldwin,  Gender:  female        MRN: [id number]  : 1950  (74 y.o.)     Referring Practitioner: Joana Galvez APRN - CNP  Diagnosis: enteritis  Additional Pertinent Hx: Per EMR \"Patient is referred to the emergency department from outpatient oncology infusion center where she had presented for her chemotherapy treatment.  Patient reported nausea and vomiting and diarrhea.  She has had chills.  Reports her urine is very dark almost the color of blood.  Patient did not receive her chemo treatment and was instead referred to the emergency department for further evaluation of illness.\"     Prior Level of Function:  Lives With: Alone  Type of Home: House  Home Layout: One level  Home Access: Stairs to enter with rails  Entrance Stairs - Number of Steps: 3-4 RAMESH  Entrance Stairs - Rails: Right  Home Equipment: None   Bathroom Shower/Tub: Tub/Shower unit  Bathroom Toilet: Standard  Bathroom Accessibility: Accessible    Receives Help From: Family  ADL Assistance: Independent  Homemaking Assistance: Independent  Ambulation Assistance: Independent  Transfer Assistance: Independent  Active : Yes  IADL Comments: owns a dog and a cat  Additional Comments: daughter works days, son work nights \"they do what they can\" Pt uses no AD for mobility. Pt reports the last week she has has more trouble getting around and completing her IADL tasks.    Restrictions/Precautions:  Restrictions/Precautions: General Precautions, Fall Risk  Position Activity Restriction  Other position/activity restrictions: neutropenic, monitor hemoglobin     SUBJECTIVE: Rn and patient are agreeable for PT. Patient is frustrated because she feels that her family is not listening to her needs in terms of what to

## 2024-07-25 NOTE — PROGRESS NOTES
Comprehensive Nutrition Assessment    Type and Reason for Visit:  Reassess    Nutrition Recommendations/Plan:   Recommend diet as GI function allows.  Continue yogurt - send once/day per pt. Request.  Continues to decline all other ONS.  Agree with appetite stimulant (Megace started 7/24) - consider increase dose for additional appetite stimulation as needed.  Recommend continue probiotics.  Encouraged po, good nutrition at best effforts.     Malnutrition Assessment:  Malnutrition Status:  Severe malnutrition (07/15/24 1141)    Context:  Acute Illness     Findings of the 6 clinical characteristics of malnutrition:  Energy Intake:  50% or less of estimated energy requirements for 5 or more days (Endorses very poor PO intake for 3 weeks)  Weight Loss:  Unable to assess (edema and fluctuations in EMR)     Body Fat Loss:  Mild body fat loss Orbital   Muscle Mass Loss:  Moderate muscle mass loss Temples (temporalis), Scapula (trapezius), Clavicles (pectoralis & deltoids)  Fluid Accumulation:  Mild Extremities   Strength:  Not Performed    Nutrition Assessment:     At risk for further nutritional compromise r/t meets criteria for severe malnutrition, increased nutrient needs for wound healing; admit with enteritis EPEC, thrombocytopenia, 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     Nutrition Related Findings:    Pt. Report/Treatments/Miscellaneous:   7/25- pt. Seen - reports appetite is \"terrible\"; has no desire to eat; states having some nausea, etc but hasn't thrown up in a few days; states is feeling better than she was; trying to eat some; ID noting diarrhea resolved, formed stools; pt. Continues to decline all ONS offered (dislikes); states acceptance of yogurt however doesn't want more than once/day; states plan discharge today ([facility])  7/23/24: Pt  seen, with not much improvement in PO intake/appetite/taste/etc. She does not like Ensure compact but was amendable to trying yogurt. Pt with emesis after eating dinner yesterday. States today has been much better with getting zofran. Diarrhea subsiding with imodium use.   7/19/24: RN- reports provided zofran prior to breakfast as patient struggles with nausea/ vomiting, patient reports having no appetite and after vomits after intake of few bites/ sips, intake of only bites for past 30 days per patient, agreeable to trial Ensure Compact instead of Ensure Clear   Note report on initial RD encounter 7/15/24: reports not eating more than a couple of bites for the last ~3 weeks r/t N/V and diarrhea, mouth sores (cannot afford magic mouth wash but have improved with salt water rinses), as well as no appetite/nothing sounding appealing. She reports not being able to keep anything down and that her medications (imodium & compazine) were not helping as she felt they should   GI Status: 3 BMs noted past 24 hours  Pertinent Labs: no new labs  Pertinent Meds: Megace 40 mg/day (started 7/24), Imodium, Culturelle, Bentyl, Zofran, Magic Mouthwash prn (given 7/17)      Wound Type:  (Rectum - Stage II)       Current Nutrition Intake & Therapies:    Average Meal Intake: 0%, 1-25%, 26-50%, %  Average Supplements Intake:  (declines all ONS; agrees to yogurt once/day)  ADULT DIET; Dysphagia - Soft and Bite Sized; Low Microbial  ADULT ORAL NUTRITION SUPPLEMENT; Breakfast; Other Oral Supplement; Yogurt    Anthropometric Measures:  Height: 152.4 cm (5')  Ideal Body Weight (IBW): 100 lbs (45 kg)    Admission Body Weight: 69.3 kg (152 lb 12.5 oz) (7/12: BLE edema)  Current Body Weight: 69.7 kg (153 lb 10.6 oz) (7/18; +2 pitting BLE edema),       Current BMI (kg/m2): 30  Usual Body Weight:  (per EMR: 3/29/24: 153# 6 oz, 5/10/24: 151# 6 oz - standing scale, 6/18/24: 147# 11 oz)     Weight Adjustment For: No Adjustment

## 2024-07-25 NOTE — PROGRESS NOTES
Hospitalist Progress Note    [facility]      Unit[facility]5K-06/006-A    YOB: 1950    MRN: [id number]       Acct: [id number]     PCP: Pooja Herrera APRN - CNP    Date of Admission: 7/12/2024    Assessment/Plan:    Enteritis:  due to Enteropathogenic E.Coli     -CT abdomen/ pelvis with IV contrast  done 7/12/24 showed: Moderate mucosal thickening is noted in the mid and distal small bowel loops  as well as diffuse colonic wall thickening suggesting enterocolitis   -ID consulted, IV Zosyn dc'd   -GI consulted 7/22/24, recommended stool c/s, C. Diff antigen, as well as CMV serum test   -GI also recommended Budesonide 9 mg daily as well, ordered, continue   -Continue supportive care    -Anti-emetics PRN   -Scopolamine patch ordered   -Probiotics ordered   -Diet advanced to soft from full liquid   -Dietitian recommending appetite stimulant, hematology/oncology contacted, no contraindication to Megace, Megace tablet ordered     2.Acute neutropenia   -Hematology consulted   -Continue Neutropenic cautions    -ANC > 1000 for 3 days, okay to d/c Neupogen per pharmacy     3. Thrombocytopenia: Patient s/p 10 units of platelets, one unit of PRBC's so far this admission   -Hematology consulted   -Continue to trend   -SCD's for DVT prophylaxis     4.Primary Hypertension  -Continue Amlodipine with parameters     5.History of breast cancer: Noted   -Heme/Onc Consulted previously during admission     6.Squamous cell carcinoma of anal canal:  -Patient follows with [facility] oncology outpatient  -Patient s/p radiation therapy 7/16/24     7.Lactic acidosis now resolved     8.Hypokalemia:  -Replace per protocol     8.Normocytic anemia:   -Trend Hgb   -s/p 1 unit PRBC's this admission      LDA: []CVC / []PICC / []Midline / []Oseguera / []Drains / []Mediport / [x]None  Antibiotics: No  Steroids: No  Labs (still needed?): [x]Yes / []No  IVF (still needed?): []Yes / [x]No    Level of care: []Step Down /

## 2024-07-25 NOTE — CARE COORDINATION
7/25/24, 2:31 PM EDT    Patient goals/plan/ treatment preferences discussed by  and .  Patient goals/plan/ treatment preferences reviewed with patient/ family.  Patient/ family verbalize understanding of discharge plan and are in agreement with goal/plan/treatment preferences.  Understanding was demonstrated using the teach back method.  AVS provided by RN at time of discharge, which includes all necessary medical information pertaining to the patients current course of illness, treatment, post-discharge goals of care, and treatment preferences.     Services At/After Discharge: [facility]illed Nursing [facility]) [facility] met with pt this afternoon, pt anticipating discharge today, plans continues for [facility]. ROBIN did ask if family would transport. Pt did call her daughter while ROBIN at bedside, reports she will transport her when she's ready.     ROBIN faxed AVS and MAR to [facility].

## 2024-07-25 NOTE — DISCHARGE SUMMARY
Hospital Medicine Discharge Summary      Patient Identification:   Tiffany Baldwin   : 1950  MRN: [id number]   Account: [id number]      Patient's PCP: Pooja Herrera APRN - CNP    Admit Date: 2024     Discharge Date: 24    Admitting Physician: No admitting provider for patient encounter.     Discharge Physician: Chris Valentine PA-C     Tiffany Baldwin is a 74 y.o. female admitted to [facility] on 2024.      HPI On Admission From H&P:    \"Patient is referred to the emergency department from outpatient oncology infusion center where she had presented for her chemotherapy treatment.  Patient reported nausea and vomiting and diarrhea.  She has had chills.  Reports her urine is very dark almost the color of blood.  Patient did not receive her chemo treatment and was instead referred to the emergency department for further evaluation of illness.     In the ED the patient is found to have enteritis along with lactic acidosis and tachycardia.  Patient is started on empiric Zosyn and hydrated.  [facility].\"    Assessment/Plan With Discharge Diagnoses:    Enteritis:  due to Enteropathogenic E.Coli               -CT abdomen/ pelvis with IV contrast  done 24 showed: Moderate mucosal thickening is noted in the mid and distal small bowel loops  as well as diffuse colonic wall thickening suggesting enterocolitis              -ID consulted, IV Zosyn dc'd              -GI consulted 24, recommended stool c/s, C. Diff antigen, as well as CMV serum test              -GI also recommended Budesonide 9 mg daily as well, ordered, prescribed upon d/c              -Continue supportive care               -Anti-emetics PRN              -Scopolamine patch ordered              -Probiotics ordered              -Diet advanced to soft from full liquid              -Dietitian recommending appetite stimulant,  hematology/oncology contacted, no contraindication to Megace, Megace tablet ordered, prescribed upon discharge   -Patient prescribed Lactobacillus, Tums PRN, and Imodium upon discharge     2.Acute neutropenia              -Hematology consulted              -Continue Neutropenic cautions              -ANC > 1000 for 3 days, Neupogen okay to discontinue per pharmacy, discontinued   -Patient may follow up with hematology/oncology outpatient     3. Thrombocytopenia: Patient s/p 10 units of platelets, one unit of PRBC's so far this admission              -Hematology consulted              -Continue to trend              -SCD's for DVT prophylaxis     4.Primary Hypertension  -Continue Amlodipine     5.History of breast cancer: Noted              -Heme/Onc Consulted previously during admission     6.Squamous cell carcinoma of anal canal:  -Patient follows with [facility] oncology outpatient  -Patient s/p radiation therapy 7/16/24     7.Lactic acidosis now resolved     8.Hypokalemia:  -Replace per protocol     8.Normocytic anemia:              -s/p 1 unit PRBC's this admission    Exam:     Vitals:  Vitals:    07/24/24 1557 07/24/24 2008 07/25/24 0328 07/25/24 1107   BP: 125/86 119/80 118/80 117/72   Pulse: (!) 107 (!) 110 (!) 108 (!) 118   Resp: 18 18 18 18   Temp: 98.6 °F (37 °C) 98.4 °F (36.9 °C) 98.2 °F (36.8 °C) 98.4 °F (36.9 °C)   TempSrc: Oral Oral Oral Oral   SpO2: 93% 93% 96% 95%   Weight:       Height:         Weight: Weight - Scale: 69.7 kg (153 lb 10.6 oz)     24 hour intake/output:  Intake/Output Summary (Last 24 hours) at 7/25/2024 1432  Last data filed at 7/25/2024 1340  Gross per 24 hour   Intake 520 ml   Output --   Net 520 ml         Labs: For convenience and continuity at follow-up the following most recent labs are provided:    CBC:    Lab Results   Component Value Date/Time    WBC 5.2 07/24/2024 06:30 AM    HGB 8.8 07/24/2024 06:30 AM    HCT 26.9 07/24/2024 06:30 AM    PLT 46 07/24/2024 06:30 AM  Rani Escobedo  [facility] [address]  [phone]        Pancho Hernandez MD  [address] [facility] [address]  [phone]    Follow up on 10/2/2024  at 1:30         Discharge Medications:        Medication List        START taking these medications      budesonide 3 MG delayed release capsule  Commonly known as: ENTOCORT EC  Take 3 capsules by mouth daily  Start taking on: July 26, 2024     calcium carbonate 500 MG chewable tablet  Commonly known as: TUMS  Take 1 tablet by mouth 3 times daily as needed for Heartburn     lactobacillus capsule  Take 1 capsule by mouth daily (with breakfast)  Start taking on: July 26, 2024     loperamide 2 MG capsule  Commonly known as: IMODIUM  Take 1 capsule by mouth every 6 hours for 10 days     megestrol 40 MG tablet  Commonly known as: MEGACE  Take 1 tablet by mouth daily  Start taking on: July 26, 2024            CONTINUE taking these medications      amLODIPine 5 MG tablet  Commonly known as: NORVASC     betamethasone valerate 0.1 % cream  Commonly known as: VALISONE  APPLY  CREAM TOPICALLY TO RADIATION TREATMENT AREA TWICE DAILY     calcium carbonate 600 MG Tabs tablet     diphenoxylate-atropine 2.5-0.025 MG per tablet  Commonly known as: LOMOTIL     FIBER PO     OMEGA 3 500 PO     ondansetron 4 MG tablet  Commonly known as: ZOFRAN  Take 1 tablet by mouth 3 times daily as needed for Nausea or Vomiting     prochlorperazine 10 MG tablet  Commonly known as: COMPAZINE  Take 1 tablet by mouth every 6 hours as needed (chemotherapy induced nausea)     therapeutic multivitamin-minerals tablet            ASK your doctor about these medications      sucralfate 1 GM/10ML suspension  Commonly known as: Carafate  Take 10 mLs by mouth 4 times daily (before meals and nightly) Take 4 times a day - 30 min before meals and at bedtime - Swish and swallow               Where to Get Your Medications        Information about where to get these medications is not yet available    Ask your nurse or doctor

## 2024-08-08 NOTE — PROGRESS NOTES
Physician Progress Note      PATIENT:               SEEMA VILLEGAS  [facility] #:                  [id number]  :                       1950  ADMIT DATE:       2024 11:40 AM  DISCH DATE:        2024 4:50 PM  RESPONDING  PROVIDER #:        Chris Valentine PA-C          QUERY TEXT:    Pt admitted with Enteritis due to Enteropathogenic E.coli . Pt noted to have   Neutropenia, thrombocytopenia, and anemia documented.  Please document the   relationship between abnormal labs and chemotherapy.    The medical record reflects the following:  Risk Factors: thrombocytopenia, neutropenia, anemia, squamous cell carcinoma   of anal canal  Clinical Indicators:  referred to the emergency department from outpatient   oncology [facility] where she had presented for her chemotherapy   treatment. During this admission, CBC showed WBC 0.2 RBC 2.79, hemoglobin 8.3   and platelets 7.  Treatment: 10 units of platelets, one unit of PRBC, hematology, GI consulted    Thank you, Nadja CDS  Options provided:  -- pancytopenia due to chemotherapy.  -- pancytopenia unrelated to chemotherapy  -- neutropenia, thrombocytopenia, and anemia only  -- Other - I will add my own diagnosis  -- Disagree - Not applicable / Not valid  -- Disagree - Clinically unable to determine / Unknown  -- Refer to Clinical Documentation Reviewer    PROVIDER RESPONSE TEXT:    This patient had neutropenia, thrombocytopenia, and anemia only.    Query created by: Maria L Avila on 2024 7:17 AM      Electronically signed by:  Chris Valentine PA-C 2024 12:41 PM

## (undated) DEVICE — BAG,BANDED,W/RUBBERBAND,STERILE,30X36: Brand: MEDLINE

## (undated) DEVICE — GLOVE ORANGE PI 7 1/2   MSG9075

## (undated) DEVICE — PENCIL SMK EVAC ALL IN 1 DSGN ENH VISIBILITY IMPROVED AIR

## (undated) DEVICE — SUTURE PROL SZ 3-0 L18IN NONABSORBABLE BLU L30MM FS-1 3/8 8663G

## (undated) DEVICE — SUTURE VICRYL + SZ 3-0 L27IN ABSRB UD L26MM SH 1/2 CIR VCP416H

## (undated) DEVICE — BREAST HERNIA: Brand: MEDLINE INDUSTRIES, INC.

## (undated) DEVICE — PREMIUM DRY TRAY LF: Brand: MEDLINE INDUSTRIES, INC.

## (undated) DEVICE — SYRINGE MED 10ML LUERLOCK TIP W/O SFTY DISP

## (undated) DEVICE — DRESSING TRNSPAR W2XL2.75IN FLM SHT SEMIPERMEABLE WIND

## (undated) DEVICE — SUTURE MONOCRYL SZ 4-0 L27IN ABSRB UD L19MM PS-2 1/2 CIR PRIM Y426H

## (undated) DEVICE — SOLUTION SCRB 4OZ 7.5% POVIDONE IOD ANTIMIC BTL

## (undated) DEVICE — BASIC SINGLE BASIN BTC-LF: Brand: MEDLINE INDUSTRIES, INC.

## (undated) DEVICE — HYPODERMIC SAFETY NEEDLE: Brand: MAGELLAN

## (undated) DEVICE — SOLUTION PREP PAINT POV IOD FOR SKIN MUCOUS MEM